# Patient Record
Sex: MALE | Race: OTHER | HISPANIC OR LATINO | URBAN - METROPOLITAN AREA
[De-identification: names, ages, dates, MRNs, and addresses within clinical notes are randomized per-mention and may not be internally consistent; named-entity substitution may affect disease eponyms.]

---

## 2017-02-18 ENCOUNTER — INPATIENT (INPATIENT)
Facility: HOSPITAL | Age: 82
LOS: 3 days | Discharge: HOME | DRG: 243 | End: 2017-02-22
Attending: HOSPITALIST | Admitting: HOSPITALIST
Payer: MEDICARE

## 2017-02-18 VITALS
RESPIRATION RATE: 18 BRPM | DIASTOLIC BLOOD PRESSURE: 67 MMHG | SYSTOLIC BLOOD PRESSURE: 142 MMHG | OXYGEN SATURATION: 96 % | HEART RATE: 62 BPM | TEMPERATURE: 97 F

## 2017-02-18 DIAGNOSIS — Z90.49 ACQUIRED ABSENCE OF OTHER SPECIFIED PARTS OF DIGESTIVE TRACT: Chronic | ICD-10-CM

## 2017-02-18 DIAGNOSIS — Z98.890 OTHER SPECIFIED POSTPROCEDURAL STATES: Chronic | ICD-10-CM

## 2017-02-18 DIAGNOSIS — Z96.643 PRESENCE OF ARTIFICIAL HIP JOINT, BILATERAL: Chronic | ICD-10-CM

## 2017-02-18 DIAGNOSIS — R55 SYNCOPE AND COLLAPSE: ICD-10-CM

## 2017-02-18 LAB
ALBUMIN SERPL ELPH-MCNC: 3.8 G/DL — SIGNIFICANT CHANGE UP (ref 3.3–5)
ALP SERPL-CCNC: 66 U/L — SIGNIFICANT CHANGE UP (ref 40–120)
ALT FLD-CCNC: 13 U/L RC — SIGNIFICANT CHANGE UP (ref 10–45)
ANION GAP SERPL CALC-SCNC: 14 MMOL/L — SIGNIFICANT CHANGE UP (ref 5–17)
APPEARANCE UR: CLEAR — SIGNIFICANT CHANGE UP
AST SERPL-CCNC: 22 U/L — SIGNIFICANT CHANGE UP (ref 10–40)
BASOPHILS # BLD AUTO: 0.1 K/UL — SIGNIFICANT CHANGE UP (ref 0–0.2)
BASOPHILS NFR BLD AUTO: 0.8 % — SIGNIFICANT CHANGE UP (ref 0–2)
BILIRUB SERPL-MCNC: 0.4 MG/DL — SIGNIFICANT CHANGE UP (ref 0.2–1.2)
BILIRUB UR-MCNC: NEGATIVE — SIGNIFICANT CHANGE UP
BUN SERPL-MCNC: 21 MG/DL — SIGNIFICANT CHANGE UP (ref 7–23)
CALCIUM SERPL-MCNC: 8.8 MG/DL — SIGNIFICANT CHANGE UP (ref 8.4–10.5)
CHLORIDE SERPL-SCNC: 103 MMOL/L — SIGNIFICANT CHANGE UP (ref 96–108)
CK MB BLD-MCNC: 7.1 % — HIGH (ref 0–3.5)
CK MB CFR SERPL CALC: 11.5 NG/ML — HIGH (ref 0–6.7)
CK SERPL-CCNC: 161 U/L — SIGNIFICANT CHANGE UP (ref 30–200)
CO2 SERPL-SCNC: 23 MMOL/L — SIGNIFICANT CHANGE UP (ref 22–31)
COLOR SPEC: YELLOW — SIGNIFICANT CHANGE UP
CREAT SERPL-MCNC: 0.92 MG/DL — SIGNIFICANT CHANGE UP (ref 0.5–1.3)
DIFF PNL FLD: NEGATIVE — SIGNIFICANT CHANGE UP
EOSINOPHIL # BLD AUTO: 0.3 K/UL — SIGNIFICANT CHANGE UP (ref 0–0.5)
EOSINOPHIL NFR BLD AUTO: 3.9 % — SIGNIFICANT CHANGE UP (ref 0–6)
GAS PNL BLDV: SIGNIFICANT CHANGE UP
GLUCOSE SERPL-MCNC: 87 MG/DL — SIGNIFICANT CHANGE UP (ref 70–99)
GLUCOSE UR QL: NEGATIVE — SIGNIFICANT CHANGE UP
HCT VFR BLD CALC: 41.3 % — SIGNIFICANT CHANGE UP (ref 39–50)
HGB BLD-MCNC: 13.6 G/DL — SIGNIFICANT CHANGE UP (ref 13–17)
HYALINE CASTS # UR AUTO: ABNORMAL
KETONES UR-MCNC: ABNORMAL
LEUKOCYTE ESTERASE UR-ACNC: NEGATIVE — SIGNIFICANT CHANGE UP
LYMPHOCYTES # BLD AUTO: 2.2 K/UL — SIGNIFICANT CHANGE UP (ref 1–3.3)
LYMPHOCYTES # BLD AUTO: 30.7 % — SIGNIFICANT CHANGE UP (ref 13–44)
MCHC RBC-ENTMCNC: 30.9 PG — SIGNIFICANT CHANGE UP (ref 27–34)
MCHC RBC-ENTMCNC: 33 GM/DL — SIGNIFICANT CHANGE UP (ref 32–36)
MCV RBC AUTO: 93.5 FL — SIGNIFICANT CHANGE UP (ref 80–100)
MONOCYTES # BLD AUTO: 0.9 K/UL — SIGNIFICANT CHANGE UP (ref 0–0.9)
MONOCYTES NFR BLD AUTO: 12.8 % — SIGNIFICANT CHANGE UP (ref 2–14)
NEUTROPHILS # BLD AUTO: 3.8 K/UL — SIGNIFICANT CHANGE UP (ref 1.8–7.4)
NEUTROPHILS NFR BLD AUTO: 51.8 % — SIGNIFICANT CHANGE UP (ref 43–77)
NITRITE UR-MCNC: NEGATIVE — SIGNIFICANT CHANGE UP
PH UR: 5.5 — SIGNIFICANT CHANGE UP (ref 4.8–8)
PLATELET # BLD AUTO: 182 K/UL — SIGNIFICANT CHANGE UP (ref 150–400)
POTASSIUM SERPL-MCNC: 4.2 MMOL/L — SIGNIFICANT CHANGE UP (ref 3.5–5.3)
POTASSIUM SERPL-SCNC: 4.2 MMOL/L — SIGNIFICANT CHANGE UP (ref 3.5–5.3)
PROT SERPL-MCNC: 6.9 G/DL — SIGNIFICANT CHANGE UP (ref 6–8.3)
PROT UR-MCNC: NEGATIVE — SIGNIFICANT CHANGE UP
RBC # BLD: 4.42 M/UL — SIGNIFICANT CHANGE UP (ref 4.2–5.8)
RBC # FLD: 15.2 % — HIGH (ref 10.3–14.5)
RBC CASTS # UR COMP ASSIST: SIGNIFICANT CHANGE UP /HPF (ref 0–2)
SODIUM SERPL-SCNC: 140 MMOL/L — SIGNIFICANT CHANGE UP (ref 135–145)
SP GR SPEC: 1.02 — SIGNIFICANT CHANGE UP (ref 1.01–1.02)
TROPONIN T SERPL-MCNC: 0.03 NG/ML — SIGNIFICANT CHANGE UP (ref 0–0.06)
UROBILINOGEN FLD QL: NEGATIVE — SIGNIFICANT CHANGE UP
WBC # BLD: 7.3 K/UL — SIGNIFICANT CHANGE UP (ref 3.8–10.5)
WBC # FLD AUTO: 7.3 K/UL — SIGNIFICANT CHANGE UP (ref 3.8–10.5)
WBC UR QL: SIGNIFICANT CHANGE UP /HPF (ref 0–5)

## 2017-02-18 PROCEDURE — 93010 ELECTROCARDIOGRAM REPORT: CPT

## 2017-02-18 PROCEDURE — 99223 1ST HOSP IP/OBS HIGH 75: CPT

## 2017-02-18 PROCEDURE — 71010: CPT | Mod: 26

## 2017-02-18 PROCEDURE — 99285 EMERGENCY DEPT VISIT HI MDM: CPT | Mod: 25,GC

## 2017-02-18 RX ORDER — FINASTERIDE 5 MG/1
5 TABLET, FILM COATED ORAL DAILY
Qty: 0 | Refills: 0 | Status: DISCONTINUED | OUTPATIENT
Start: 2017-02-18 | End: 2017-02-22

## 2017-02-18 RX ORDER — ENOXAPARIN SODIUM 100 MG/ML
40 INJECTION SUBCUTANEOUS EVERY 24 HOURS
Qty: 0 | Refills: 0 | Status: DISCONTINUED | OUTPATIENT
Start: 2017-02-18 | End: 2017-02-22

## 2017-02-18 RX ORDER — TAMSULOSIN HYDROCHLORIDE 0.4 MG/1
0.4 CAPSULE ORAL AT BEDTIME
Qty: 0 | Refills: 0 | Status: DISCONTINUED | OUTPATIENT
Start: 2017-02-18 | End: 2017-02-22

## 2017-02-18 RX ORDER — SODIUM CHLORIDE 9 MG/ML
500 INJECTION INTRAMUSCULAR; INTRAVENOUS; SUBCUTANEOUS ONCE
Qty: 0 | Refills: 0 | Status: COMPLETED | OUTPATIENT
Start: 2017-02-18 | End: 2017-02-18

## 2017-02-18 RX ADMIN — SODIUM CHLORIDE 1000 MILLILITER(S): 9 INJECTION INTRAMUSCULAR; INTRAVENOUS; SUBCUTANEOUS at 21:45

## 2017-02-18 NOTE — ED ADULT TRIAGE NOTE - CHIEF COMPLAINT QUOTE
witnessed syncopal episode tonight at home while sitting in chair, did not fall- just head tilted back. when he came to his heart rate was 35.He had a h/a last night and earlier today with was relief with tylenol aprox 1600. feeling a little lightheaded now" witnessed syncopal episode tonight at home while sitting in chair, did not fall- just head tilted back. when he came to his heart rate was 35.He had a h/a last night and earlier today with was relief with tylenol aprox 1600. feeling a little lightheaded now. just move here on 2/14/17 [place]"

## 2017-02-18 NOTE — H&P ADULT. - PROBLEM SELECTOR PLAN 1
Orthostatics in ER negative. Somewhat suspicious for cardiogenic syncope given abnormal ekg. As per daughter, old ekg showed only LVH. No obvious inciting event. Will finish trending cardiac enzymes, if negative x2 will schedule stress test. Will e-mail cardiology consult, if troponin elevated inform cardiology and d/c diet order.  -Telemetry  -TTE  -Trend cardiac enzymes, place stress test order if negative or can defer to cardiology.  -Would discuss with cards/EP utility of loop recorder or holter monitor if work up is negative.

## 2017-02-18 NOTE — H&P ADULT. - HISTORY OF PRESENT ILLNESS
85 Bengali M w/ BPH, s/p bilateral hip replacements p/w syncope. Pt sitting in kitchen talking on phone while daughter prepared food when suddenly his daughter heard the person on the phone saying hello?, hello?. Her father was slumped against the wall with his eyes closed and not responding for 1-2 minutes. 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 Pt felt nausea initially after waking and it soon resolved. He could not recall these events. Pt denies chest pain, palpitations, LE edema, orthopnea, dyspnea, dizziness, any hx of syncope. Pt did not have significant exertion during the day, had a finger level of [place] earlier at night while he was evaluating documents, pt is a . Reportedly stress test 20 yrs ago for hip replacements was negative. Pt and daughter deny any significant cardiac history.    In ER: Given NS 500ccs

## 2017-02-18 NOTE — ED PROVIDER NOTE - OBJECTIVE STATEMENT
85 M with BPH, hip replacement, here for syncope. Patient syncopized while sitting down on phone, eyes were open, not moving, unresponsive for 2 min. Spontaneously resolved. vitals measured by daughter to be HR 35 and /70 on portable home monitor. Last stress test done in [place] 20 years ago after hip replacement. No chest pain, SOB, headache, focal weakness before or after syncope. No generalized or focal body movements around syncope. Patient mental status improved rapidly after syncope.

## 2017-02-18 NOTE — ED PROVIDER NOTE - MEDICAL DECISION MAKING DETAILS
syncope, doubt intracranial process, suspect cardiac etiology given H/P. Will do CXR, EKG, basics, cardiac enzymes, UA, UCx, admit for stress test and monitoring.

## 2017-02-18 NOTE — ED ADULT NURSE NOTE - CHIEF COMPLAINT QUOTE
witnessed syncopal episode tonight at home while sitting in chair, did not fall- just head tilted back. when he came to his heart rate was 35.He had a h/a last night and earlier today with was relief with tylenol aprox 1600. feeling a little lightheaded now"

## 2017-02-18 NOTE — H&P ADULT. - FAMILY HISTORY
Sibling  Still living? Unknown  Family history of AICD (automatic internal cardiac defibrillator), Age at diagnosis: Age Unknown

## 2017-02-18 NOTE — ED ADULT NURSE NOTE - PSH
H/O bilateral hip replacements    H/O carpal tunnel repair    History of appendectomy    History of right cataract surgery  bilat cataract sx

## 2017-02-18 NOTE — ED ADULT NURSE NOTE - OBJECTIVE STATEMENT
84 y/o male arrived to ED c.o syncope. Per daughter pt was sitting in chair and talking on phone when head nodded back for approximately 1 minute and pt became pale non responsive to voice. When pt awoke immediately back to baseline. Pt a&ox3, neg sob, neg chest pain, abd soft nontender, pulse motor sensoryx4, skin warm dry intact. 20g Iv placed RAC.  Family at bedside. Placed on CM , will continue to monitor

## 2017-02-18 NOTE — ED PROVIDER NOTE - ATTENDING CONTRIBUTION TO CARE
85 yom pmhx bph, prior hip replacement, presents after syncope. as per daughter at bedside, pt was talking on the phone when he suddenly became unresponsive while sitting in a chair. daughter witnessed episode - states he was minimally responsive x1-2 minutes, no seizure activity, no diaphoresis. daugther had an 02 sat monitor at home and noted pt to have a hr in 30's per this monitor at home. states sxs resolved and pt is now back to baseline mental status. pt cannot recollect specifics of the event, but does not report any recent cp or sob. last stress test decades ago.    ROS:   constitutional - no fever, no chills  eyes - no visual changes, no redness  eent - no sore throat, no nasal congestion  cvs - no chest pain, no leg swelling, + syncope  resp - no shortness of breath, no cough  gi - no abdominal pain, no vomiting, no diarrhea  gu - no dysuria, no hematuria  msk - no acute back pain, no joint swelling  skin - no rashes, no jaundice  neuro - no headache, no focal weakness  psych - no acute mental health issue     Physical Exam:   constitutional - well appearing, awake and alert, oriented x3  head - no external evidence of trauma  cvs - rrr, no murmurs, no peripheral edema  resp - breath sounds clear and equal bilat  gi - abdomen soft and nontender, no rigidity, guarding or rebound, bowel sounds present  msk - moving all extremities spontaneously  neuro - alert and oriented x3, no focal deficits, CNs 2-12 grossly intact  skin- no jaundice, warm and dry  psych - mood and affect wnl, no apparent risk to self or others     ? whether severe bradycardia was cause for syncopal episode vs acs. no episodes of significant bradycardia during ED stay. will admit for ongoing eval. RAJNI Ayala MD

## 2017-02-19 DIAGNOSIS — N40.0 BENIGN PROSTATIC HYPERPLASIA WITHOUT LOWER URINARY TRACT SYMPTOMS: ICD-10-CM

## 2017-02-19 DIAGNOSIS — Z29.9 ENCOUNTER FOR PROPHYLACTIC MEASURES, UNSPECIFIED: ICD-10-CM

## 2017-02-19 DIAGNOSIS — R55 SYNCOPE AND COLLAPSE: ICD-10-CM

## 2017-02-19 LAB
APTT BLD: 30.5 SEC — SIGNIFICANT CHANGE UP (ref 27.5–37.4)
CHOLEST SERPL-MCNC: 180 MG/DL — SIGNIFICANT CHANGE UP (ref 10–199)
CK MB BLD-MCNC: 7.5 % — HIGH (ref 0–3.5)
CK MB CFR SERPL CALC: 10.7 NG/ML — HIGH (ref 0–6.7)
CK MB CFR SERPL CALC: 9 NG/ML — HIGH (ref 0–6.7)
CK SERPL-CCNC: 120 U/L — SIGNIFICANT CHANGE UP (ref 30–200)
CK SERPL-CCNC: 142 U/L — SIGNIFICANT CHANGE UP (ref 30–200)
CULTURE RESULTS: NO GROWTH — SIGNIFICANT CHANGE UP
HBA1C BLD-MCNC: 5.6 % — SIGNIFICANT CHANGE UP (ref 4–5.6)
HDLC SERPL-MCNC: 54 MG/DL — SIGNIFICANT CHANGE UP (ref 40–125)
INR BLD: 1.11 RATIO — SIGNIFICANT CHANGE UP (ref 0.88–1.16)
LIPID PNL WITH DIRECT LDL SERPL: 111 MG/DL — SIGNIFICANT CHANGE UP
NT-PROBNP SERPL-SCNC: 212 PG/ML — SIGNIFICANT CHANGE UP (ref 0–300)
PROTHROM AB SERPL-ACNC: 12.6 SEC — SIGNIFICANT CHANGE UP (ref 10–13.1)
SPECIMEN SOURCE: SIGNIFICANT CHANGE UP
TOTAL CHOLESTEROL/HDL RATIO MEASUREMENT: 3.3 RATIO — LOW (ref 3.4–9.6)
TRIGL SERPL-MCNC: 77 MG/DL — SIGNIFICANT CHANGE UP (ref 10–149)
TROPONIN T SERPL-MCNC: 0.03 NG/ML — SIGNIFICANT CHANGE UP (ref 0–0.06)
TROPONIN T SERPL-MCNC: 0.05 NG/ML — SIGNIFICANT CHANGE UP (ref 0–0.06)
TSH SERPL-MCNC: 2.47 UIU/ML — SIGNIFICANT CHANGE UP (ref 0.27–4.2)

## 2017-02-19 PROCEDURE — 93306 TTE W/DOPPLER COMPLETE: CPT | Mod: 26

## 2017-02-19 PROCEDURE — 93010 ELECTROCARDIOGRAM REPORT: CPT

## 2017-02-19 PROCEDURE — 99233 SBSQ HOSP IP/OBS HIGH 50: CPT

## 2017-02-19 RX ORDER — INFLUENZA VIRUS VACCINE 15; 15; 15; 15 UG/.5ML; UG/.5ML; UG/.5ML; UG/.5ML
0.5 SUSPENSION INTRAMUSCULAR ONCE
Qty: 0 | Refills: 0 | Status: COMPLETED | OUTPATIENT
Start: 2017-02-19 | End: 2017-02-19

## 2017-02-19 RX ADMIN — ENOXAPARIN SODIUM 40 MILLIGRAM(S): 100 INJECTION SUBCUTANEOUS at 05:40

## 2017-02-19 RX ADMIN — FINASTERIDE 5 MILLIGRAM(S): 5 TABLET, FILM COATED ORAL at 12:13

## 2017-02-19 RX ADMIN — TAMSULOSIN HYDROCHLORIDE 0.4 MILLIGRAM(S): 0.4 CAPSULE ORAL at 21:24

## 2017-02-19 NOTE — PATIENT PROFILE ADULT. - VISION (WITH CORRECTIVE LENSES IF THE PATIENT USUALLY WEARS THEM):
Normal vision: sees adequately in most situations; can see medication labels, newsprint/Hx of cataract sx b/l Summer 2014

## 2017-02-20 PROCEDURE — 99232 SBSQ HOSP IP/OBS MODERATE 35: CPT

## 2017-02-20 RX ORDER — ACETAMINOPHEN 500 MG
650 TABLET ORAL EVERY 6 HOURS
Qty: 0 | Refills: 0 | Status: DISCONTINUED | OUTPATIENT
Start: 2017-02-20 | End: 2017-02-22

## 2017-02-20 RX ORDER — MAGNESIUM HYDROXIDE 400 MG/1
30 TABLET, CHEWABLE ORAL ONCE
Qty: 0 | Refills: 0 | Status: COMPLETED | OUTPATIENT
Start: 2017-02-20 | End: 2017-02-20

## 2017-02-20 RX ADMIN — Medication 650 MILLIGRAM(S): at 15:58

## 2017-02-20 RX ADMIN — FINASTERIDE 5 MILLIGRAM(S): 5 TABLET, FILM COATED ORAL at 11:24

## 2017-02-20 RX ADMIN — MAGNESIUM HYDROXIDE 30 MILLILITER(S): 400 TABLET, CHEWABLE ORAL at 22:48

## 2017-02-20 RX ADMIN — TAMSULOSIN HYDROCHLORIDE 0.4 MILLIGRAM(S): 0.4 CAPSULE ORAL at 21:32

## 2017-02-20 RX ADMIN — ENOXAPARIN SODIUM 40 MILLIGRAM(S): 100 INJECTION SUBCUTANEOUS at 05:13

## 2017-02-20 RX ADMIN — Medication 650 MILLIGRAM(S): at 16:28

## 2017-02-21 PROBLEM — N40.0 BENIGN PROSTATIC HYPERPLASIA WITHOUT LOWER URINARY TRACT SYMPTOMS: Chronic | Status: ACTIVE | Noted: 2017-02-18

## 2017-02-21 LAB
ANION GAP SERPL CALC-SCNC: 13 MMOL/L — SIGNIFICANT CHANGE UP (ref 5–17)
BUN SERPL-MCNC: 18 MG/DL — SIGNIFICANT CHANGE UP (ref 7–23)
CALCIUM SERPL-MCNC: 9.8 MG/DL — SIGNIFICANT CHANGE UP (ref 8.4–10.5)
CHLORIDE SERPL-SCNC: 102 MMOL/L — SIGNIFICANT CHANGE UP (ref 96–108)
CO2 SERPL-SCNC: 26 MMOL/L — SIGNIFICANT CHANGE UP (ref 22–31)
CREAT SERPL-MCNC: 0.97 MG/DL — SIGNIFICANT CHANGE UP (ref 0.5–1.3)
GLUCOSE SERPL-MCNC: 85 MG/DL — SIGNIFICANT CHANGE UP (ref 70–99)
HCT VFR BLD CALC: 46.4 % — SIGNIFICANT CHANGE UP (ref 39–50)
HGB BLD-MCNC: 15.1 G/DL — SIGNIFICANT CHANGE UP (ref 13–17)
MCHC RBC-ENTMCNC: 30.1 PG — SIGNIFICANT CHANGE UP (ref 27–34)
MCHC RBC-ENTMCNC: 32.5 GM/DL — SIGNIFICANT CHANGE UP (ref 32–36)
MCV RBC AUTO: 92.8 FL — SIGNIFICANT CHANGE UP (ref 80–100)
PLATELET # BLD AUTO: 193 K/UL — SIGNIFICANT CHANGE UP (ref 150–400)
POTASSIUM SERPL-MCNC: 4.6 MMOL/L — SIGNIFICANT CHANGE UP (ref 3.5–5.3)
POTASSIUM SERPL-SCNC: 4.6 MMOL/L — SIGNIFICANT CHANGE UP (ref 3.5–5.3)
RBC # BLD: 5 M/UL — SIGNIFICANT CHANGE UP (ref 4.2–5.8)
RBC # FLD: 15.6 % — HIGH (ref 10.3–14.5)
SODIUM SERPL-SCNC: 141 MMOL/L — SIGNIFICANT CHANGE UP (ref 135–145)
WBC # BLD: 8.4 K/UL — SIGNIFICANT CHANGE UP (ref 3.8–10.5)
WBC # FLD AUTO: 8.4 K/UL — SIGNIFICANT CHANGE UP (ref 3.8–10.5)

## 2017-02-21 PROCEDURE — 71010: CPT | Mod: 26

## 2017-02-21 PROCEDURE — 99232 SBSQ HOSP IP/OBS MODERATE 35: CPT

## 2017-02-21 PROCEDURE — 33208 INSRT HEART PM ATRIAL & VENT: CPT | Mod: 59,KX

## 2017-02-21 PROCEDURE — 93010 ELECTROCARDIOGRAM REPORT: CPT

## 2017-02-21 PROCEDURE — 93620 COMP EP EVL R AT VEN PAC&REC: CPT | Mod: 26

## 2017-02-21 RX ORDER — CEFAZOLIN SODIUM 1 G
1000 VIAL (EA) INJECTION EVERY 8 HOURS
Qty: 0 | Refills: 0 | Status: COMPLETED | OUTPATIENT
Start: 2017-02-21 | End: 2017-02-22

## 2017-02-21 RX ADMIN — Medication 100 MILLIGRAM(S): at 20:21

## 2017-02-21 RX ADMIN — TAMSULOSIN HYDROCHLORIDE 0.4 MILLIGRAM(S): 0.4 CAPSULE ORAL at 20:22

## 2017-02-21 RX ADMIN — Medication 650 MILLIGRAM(S): at 18:14

## 2017-02-21 RX ADMIN — FINASTERIDE 5 MILLIGRAM(S): 5 TABLET, FILM COATED ORAL at 18:13

## 2017-02-21 RX ADMIN — Medication 650 MILLIGRAM(S): at 19:00

## 2017-02-22 ENCOUNTER — TRANSCRIPTION ENCOUNTER (OUTPATIENT)
Age: 82
End: 2017-02-22

## 2017-02-22 VITALS
DIASTOLIC BLOOD PRESSURE: 68 MMHG | HEART RATE: 88 BPM | RESPIRATION RATE: 18 BRPM | SYSTOLIC BLOOD PRESSURE: 120 MMHG | OXYGEN SATURATION: 92 % | TEMPERATURE: 97 F

## 2017-02-22 LAB
ANION GAP SERPL CALC-SCNC: 16 MMOL/L — SIGNIFICANT CHANGE UP (ref 5–17)
BUN SERPL-MCNC: 19 MG/DL — SIGNIFICANT CHANGE UP (ref 7–23)
CALCIUM SERPL-MCNC: 8.7 MG/DL — SIGNIFICANT CHANGE UP (ref 8.4–10.5)
CHLORIDE SERPL-SCNC: 101 MMOL/L — SIGNIFICANT CHANGE UP (ref 96–108)
CO2 SERPL-SCNC: 22 MMOL/L — SIGNIFICANT CHANGE UP (ref 22–31)
CREAT SERPL-MCNC: 0.84 MG/DL — SIGNIFICANT CHANGE UP (ref 0.5–1.3)
GLUCOSE SERPL-MCNC: 103 MG/DL — HIGH (ref 70–99)
HCT VFR BLD CALC: 41.4 % — SIGNIFICANT CHANGE UP (ref 39–50)
HGB BLD-MCNC: 13.8 G/DL — SIGNIFICANT CHANGE UP (ref 13–17)
MCHC RBC-ENTMCNC: 29.6 PG — SIGNIFICANT CHANGE UP (ref 27–34)
MCHC RBC-ENTMCNC: 33.3 GM/DL — SIGNIFICANT CHANGE UP (ref 32–36)
MCV RBC AUTO: 88.8 FL — SIGNIFICANT CHANGE UP (ref 80–100)
PLATELET # BLD AUTO: 185 K/UL — SIGNIFICANT CHANGE UP (ref 150–400)
POTASSIUM SERPL-MCNC: 4 MMOL/L — SIGNIFICANT CHANGE UP (ref 3.5–5.3)
POTASSIUM SERPL-SCNC: 4 MMOL/L — SIGNIFICANT CHANGE UP (ref 3.5–5.3)
RBC # BLD: 4.66 M/UL — SIGNIFICANT CHANGE UP (ref 4.2–5.8)
RBC # FLD: 16.1 % — HIGH (ref 10.3–14.5)
SODIUM SERPL-SCNC: 139 MMOL/L — SIGNIFICANT CHANGE UP (ref 135–145)
WBC # BLD: 8.55 K/UL — SIGNIFICANT CHANGE UP (ref 3.8–10.5)
WBC # FLD AUTO: 8.55 K/UL — SIGNIFICANT CHANGE UP (ref 3.8–10.5)

## 2017-02-22 PROCEDURE — 87086 URINE CULTURE/COLONY COUNT: CPT

## 2017-02-22 PROCEDURE — 82803 BLOOD GASES ANY COMBINATION: CPT

## 2017-02-22 PROCEDURE — 85027 COMPLETE CBC AUTOMATED: CPT

## 2017-02-22 PROCEDURE — 85014 HEMATOCRIT: CPT

## 2017-02-22 PROCEDURE — 71045 X-RAY EXAM CHEST 1 VIEW: CPT

## 2017-02-22 PROCEDURE — 93306 TTE W/DOPPLER COMPLETE: CPT

## 2017-02-22 PROCEDURE — 82435 ASSAY OF BLOOD CHLORIDE: CPT

## 2017-02-22 PROCEDURE — 83880 ASSAY OF NATRIURETIC PEPTIDE: CPT

## 2017-02-22 PROCEDURE — 97162 PT EVAL MOD COMPLEX 30 MIN: CPT

## 2017-02-22 PROCEDURE — 93005 ELECTROCARDIOGRAM TRACING: CPT

## 2017-02-22 PROCEDURE — 84443 ASSAY THYROID STIM HORMONE: CPT

## 2017-02-22 PROCEDURE — C1892: CPT

## 2017-02-22 PROCEDURE — 82947 ASSAY GLUCOSE BLOOD QUANT: CPT

## 2017-02-22 PROCEDURE — 80048 BASIC METABOLIC PNL TOTAL CA: CPT

## 2017-02-22 PROCEDURE — 82550 ASSAY OF CK (CPK): CPT

## 2017-02-22 PROCEDURE — 99239 HOSP IP/OBS DSCHRG MGMT >30: CPT

## 2017-02-22 PROCEDURE — 82553 CREATINE MB FRACTION: CPT

## 2017-02-22 PROCEDURE — C1785: CPT

## 2017-02-22 PROCEDURE — 84132 ASSAY OF SERUM POTASSIUM: CPT

## 2017-02-22 PROCEDURE — 80053 COMPREHEN METABOLIC PANEL: CPT

## 2017-02-22 PROCEDURE — 83036 HEMOGLOBIN GLYCOSYLATED A1C: CPT

## 2017-02-22 PROCEDURE — 83605 ASSAY OF LACTIC ACID: CPT

## 2017-02-22 PROCEDURE — 99285 EMERGENCY DEPT VISIT HI MDM: CPT | Mod: 25

## 2017-02-22 PROCEDURE — C1730: CPT

## 2017-02-22 PROCEDURE — 85610 PROTHROMBIN TIME: CPT

## 2017-02-22 PROCEDURE — 33208 INSRT HEART PM ATRIAL & VENT: CPT | Mod: 59

## 2017-02-22 PROCEDURE — 82330 ASSAY OF CALCIUM: CPT

## 2017-02-22 PROCEDURE — C1894: CPT

## 2017-02-22 PROCEDURE — 81001 URINALYSIS AUTO W/SCOPE: CPT

## 2017-02-22 PROCEDURE — 84295 ASSAY OF SERUM SODIUM: CPT

## 2017-02-22 PROCEDURE — C1898: CPT

## 2017-02-22 PROCEDURE — 82962 GLUCOSE BLOOD TEST: CPT

## 2017-02-22 PROCEDURE — 80061 LIPID PANEL: CPT

## 2017-02-22 PROCEDURE — 85730 THROMBOPLASTIN TIME PARTIAL: CPT

## 2017-02-22 PROCEDURE — 93620 COMP EP EVL R AT VEN PAC&REC: CPT

## 2017-02-22 PROCEDURE — 84484 ASSAY OF TROPONIN QUANT: CPT

## 2017-02-22 RX ORDER — TAMSULOSIN HYDROCHLORIDE 0.4 MG/1
1 CAPSULE ORAL
Qty: 0 | Refills: 0 | COMMUNITY
Start: 2017-02-22

## 2017-02-22 RX ADMIN — Medication 100 MILLIGRAM(S): at 11:37

## 2017-02-22 RX ADMIN — Medication 650 MILLIGRAM(S): at 05:45

## 2017-02-22 RX ADMIN — Medication 650 MILLIGRAM(S): at 05:15

## 2017-02-22 RX ADMIN — FINASTERIDE 5 MILLIGRAM(S): 5 TABLET, FILM COATED ORAL at 11:37

## 2017-02-22 RX ADMIN — ENOXAPARIN SODIUM 40 MILLIGRAM(S): 100 INJECTION SUBCUTANEOUS at 05:17

## 2017-02-22 RX ADMIN — Medication 100 MILLIGRAM(S): at 05:18

## 2017-02-22 NOTE — DISCHARGE NOTE ADULT - MEDICATION SUMMARY - MEDICATIONS TO TAKE
I will START or STAY ON the medications listed below when I get home from the hospital:    finasteride 5 mg oral tablet  -- 1 tab(s) by mouth once a day  -- Indication: For BPH (benign prostatic hyperplasia)    alfuzosin 10 mg oral tablet, extended release  -- 1 tab(s) by mouth once a day  -- Indication: For BPH (benign prostatic hyperplasia)

## 2017-02-22 NOTE — DISCHARGE NOTE ADULT - CARE PLAN
Principal Discharge DX:	Syncope, unspecified syncope type  Secondary Diagnosis:	Benign prostatic hyperplasia, presence of lower urinary tract symptoms unspecified, unspecified morphology Principal Discharge DX:	Syncope, unspecified syncope type  Goal:	Follow up with your doctors  Instructions for follow-up, activity and diet:	You were admitted to the hospital after passing out (syncope) secondary to a low heart rate (bradycardia). You now have a pacemaker implanted which should prevent this from happening again. Please follow up with the EP clinic and your primary doctor after discharge. If you should experience Chest pain, difficulty breathing, further fainting spells, or redness/pain/swelling of pacemaker site please call your doctor or return to the emergency room  Secondary Diagnosis:	Benign prostatic hyperplasia, presence of lower urinary tract symptoms unspecified, unspecified morphology  Instructions for follow-up, activity and diet:	please continue to take your home medications for your prostate Principal Discharge DX:	Syncope, unspecified syncope type  Goal:	Follow up with your doctors  Assessment and plan of treatment:	You were admitted to the hospital after passing out (syncope) secondary to a low heart rate (bradycardia). You now have a pacemaker implanted which should prevent this from happening again. Please follow up with the EP clinic and your primary doctor after discharge. If you should experience Chest pain, difficulty breathing, further fainting spells, or redness/pain/swelling of pacemaker site please call your doctor or return to the emergency room  Secondary Diagnosis:	Benign prostatic hyperplasia, presence of lower urinary tract symptoms unspecified, unspecified morphology  Assessment and plan of treatment:	please continue to take your home medications for your prostate

## 2017-02-22 NOTE — PHYSICAL THERAPY INITIAL EVALUATION ADULT - PLANNED THERAPY INTERVENTIONS, PT EVAL
stair negotiation/balance training/strengthening/transfer training/gait training/bed mobility training

## 2017-02-22 NOTE — PHYSICAL THERAPY INITIAL EVALUATION ADULT - PERTINENT HX OF CURRENT PROBLEM, REHAB EVAL
86yo M s/p bilateral hip replacements p/w syncope. Pt felt nausea initially after waking and it soon resolved. He could not recall these events.

## 2017-02-22 NOTE — DISCHARGE NOTE ADULT - PLAN OF CARE
Follow up with your doctors You were admitted to the hospital after passing out (syncope) secondary to a low heart rate (bradycardia). You now have a pacemaker implanted which should prevent this from happening again. Please follow up with the EP clinic and your primary doctor after discharge. If you should experience Chest pain, difficulty breathing, further fainting spells, or redness/pain/swelling of pacemaker site please call your doctor or return to the emergency room please continue to take your home medications for your prostate

## 2017-02-22 NOTE — DISCHARGE NOTE ADULT - PATIENT PORTAL LINK FT
“You can access the Follow[facility] Patient Portal, offered by [facility], by registering with the following website: [email or web address]”

## 2017-02-22 NOTE — DISCHARGE NOTE ADULT - HOSPITAL COURSE
h 85 Croatian M w/ BPH, s/p bilateral hip replacements p/w syncope 2/2 symptomatic bradycardia. patient was evaluated by electrophysiology and deemed appropriate candidate for pacemaker. S/p pacemaker placement during hospitalizaiton and pt is medically stable for d/c home with close follow up.

## 2017-02-22 NOTE — DISCHARGE NOTE ADULT - VISION (WITH CORRECTIVE LENSES IF THE PATIENT USUALLY WEARS THEM):
Hx of cataract sx b/l Summer 2014/Normal vision: sees adequately in most situations; can see medication labels, newsprint

## 2017-02-22 NOTE — DISCHARGE NOTE ADULT - CARE PROVIDER_API CALL
EP Clinic,   You have an appointment for pacemaker check at the EP clinic on March, 3rd 2017 at 9:50am.  Phone: [phone]  Fax: (   )    - EP Clinic,   You have an appointment for pacemaker check at the EP clinic on March, 3rd 2017 at 9:50am.  Phone: [phone]  Fax: (   )    -    Dr. Bridget Gill  Phone: [phone]  Fax: (   )    -

## 2017-02-22 NOTE — PHYSICAL THERAPY INITIAL EVALUATION ADULT - ADDITIONAL COMMENTS
pt lives in a house with his wife and daughter with 2 steps to enter and 1 flight within the home. PTA he was amb I without AD.

## 2017-02-22 NOTE — DISCHARGE NOTE ADULT - PROVIDER TOKENS
FREE:[LAST:[EP Clinic],PHONE:[phone]],FAX:[(   )    -],ADDRESS:[You have an appointment for pacemaker check at the EP clinic on March, 3rd 2017 at 9:50am.]] FREE:[LAST:[EP Clinic],PHONE:[phone]],FAX:[(   )    -],ADDRESS:[You have an appointment for pacemaker check at the EP clinic on March, 3rd 2017 at 9:50am.]],FREE:[LAST:[[facility]],PHONE:[phone]],FAX:[(   )    -],ADDRESS:[Dr. Gill]]

## 2017-03-03 ENCOUNTER — APPOINTMENT (OUTPATIENT)
Dept: ELECTROPHYSIOLOGY | Facility: CLINIC | Age: 82
End: 2017-03-03

## 2017-03-03 VITALS
HEIGHT: 68 IN | DIASTOLIC BLOOD PRESSURE: 73 MMHG | HEART RATE: 75 BPM | BODY MASS INDEX: 26.07 KG/M2 | WEIGHT: 172 LBS | SYSTOLIC BLOOD PRESSURE: 148 MMHG

## 2017-03-03 DIAGNOSIS — Z87.898 PERSONAL HISTORY OF OTHER SPECIFIED CONDITIONS: ICD-10-CM

## 2017-03-03 RX ORDER — FINASTERIDE 5 MG/1
5 TABLET, FILM COATED ORAL
Refills: 0 | Status: ACTIVE | COMMUNITY

## 2017-03-03 RX ORDER — ALFUZOSIN HYDROCHLORIDE 10 MG/1
10 TABLET, EXTENDED RELEASE ORAL
Refills: 0 | Status: ACTIVE | COMMUNITY

## 2017-06-07 ENCOUNTER — APPOINTMENT (OUTPATIENT)
Dept: CARDIOLOGY | Facility: CLINIC | Age: 82
End: 2017-06-07

## 2017-06-07 ENCOUNTER — APPOINTMENT (OUTPATIENT)
Dept: ELECTROPHYSIOLOGY | Facility: CLINIC | Age: 82
End: 2017-06-07

## 2017-06-07 ENCOUNTER — APPOINTMENT (OUTPATIENT)
Dept: PHARMACY | Facility: CLINIC | Age: 82
End: 2017-06-07

## 2017-06-07 ENCOUNTER — NON-APPOINTMENT (OUTPATIENT)
Age: 82
End: 2017-06-07

## 2017-06-07 VITALS — OXYGEN SATURATION: 96 % | SYSTOLIC BLOOD PRESSURE: 151 MMHG | HEART RATE: 59 BPM | DIASTOLIC BLOOD PRESSURE: 77 MMHG

## 2017-06-07 DIAGNOSIS — I65.29 OCCLUSION AND STENOSIS OF UNSPECIFIED CAROTID ARTERY: ICD-10-CM

## 2017-06-07 DIAGNOSIS — Z95.0 PRESENCE OF CARDIAC PACEMAKER: ICD-10-CM

## 2017-06-22 ENCOUNTER — APPOINTMENT (OUTPATIENT)
Dept: ULTRASOUND IMAGING | Facility: HOSPITAL | Age: 82
End: 2017-06-22

## 2017-06-22 ENCOUNTER — OUTPATIENT (OUTPATIENT)
Dept: OUTPATIENT SERVICES | Facility: HOSPITAL | Age: 82
LOS: 1 days | End: 2017-06-22
Payer: MEDICARE

## 2017-06-22 DIAGNOSIS — Z98.890 OTHER SPECIFIED POSTPROCEDURAL STATES: Chronic | ICD-10-CM

## 2017-06-22 DIAGNOSIS — Z00.00 ENCOUNTER FOR GENERAL ADULT MEDICAL EXAMINATION WITHOUT ABNORMAL FINDINGS: ICD-10-CM

## 2017-06-22 DIAGNOSIS — Z96.643 PRESENCE OF ARTIFICIAL HIP JOINT, BILATERAL: Chronic | ICD-10-CM

## 2017-06-22 DIAGNOSIS — Z90.49 ACQUIRED ABSENCE OF OTHER SPECIFIED PARTS OF DIGESTIVE TRACT: Chronic | ICD-10-CM

## 2017-06-22 DIAGNOSIS — N40.1 BENIGN PROSTATIC HYPERPLASIA WITH LOWER URINARY TRACT SYMPTOMS: ICD-10-CM

## 2017-06-22 PROCEDURE — 93880 EXTRACRANIAL BILAT STUDY: CPT

## 2017-06-22 PROCEDURE — 93880 EXTRACRANIAL BILAT STUDY: CPT | Mod: 26

## 2017-06-27 RX ORDER — PRAVASTATIN SODIUM 10 MG/1
10 TABLET ORAL
Qty: 90 | Refills: 2 | Status: ACTIVE | COMMUNITY
Start: 2017-06-27 | End: 1900-01-01

## 2017-07-25 PROBLEM — Z95.0 CARDIAC PACEMAKER: Status: ACTIVE | Noted: 2017-03-03

## 2017-07-25 PROBLEM — I65.29 CAROTID ARTERY PLAQUE: Status: ACTIVE | Noted: 2017-07-25

## 2017-08-09 ENCOUNTER — APPOINTMENT (OUTPATIENT)
Dept: ELECTROPHYSIOLOGY | Facility: CLINIC | Age: 82
End: 2017-08-09
Payer: MEDICARE

## 2017-08-09 ENCOUNTER — NON-APPOINTMENT (OUTPATIENT)
Age: 82
End: 2017-08-09

## 2017-08-09 VITALS
BODY MASS INDEX: 27.58 KG/M2 | WEIGHT: 182 LBS | OXYGEN SATURATION: 95 % | SYSTOLIC BLOOD PRESSURE: 127 MMHG | DIASTOLIC BLOOD PRESSURE: 74 MMHG | HEART RATE: 61 BPM | HEIGHT: 68 IN

## 2017-08-09 PROCEDURE — 93280 PM DEVICE PROGR EVAL DUAL: CPT

## 2017-11-22 ENCOUNTER — APPOINTMENT (OUTPATIENT)
Dept: PAIN MANAGEMENT | Facility: CLINIC | Age: 82
End: 2017-11-22

## 2017-12-13 ENCOUNTER — APPOINTMENT (OUTPATIENT)
Dept: ELECTROPHYSIOLOGY | Facility: CLINIC | Age: 82
End: 2017-12-13
Payer: MEDICARE

## 2017-12-13 ENCOUNTER — APPOINTMENT (OUTPATIENT)
Dept: CARDIOLOGY | Facility: CLINIC | Age: 82
End: 2017-12-13
Payer: MEDICARE

## 2017-12-13 ENCOUNTER — NON-APPOINTMENT (OUTPATIENT)
Age: 82
End: 2017-12-13

## 2017-12-13 VITALS — HEART RATE: 65 BPM | OXYGEN SATURATION: 95 % | SYSTOLIC BLOOD PRESSURE: 160 MMHG | DIASTOLIC BLOOD PRESSURE: 77 MMHG

## 2017-12-13 PROCEDURE — 93000 ELECTROCARDIOGRAM COMPLETE: CPT

## 2017-12-13 PROCEDURE — 93280 PM DEVICE PROGR EVAL DUAL: CPT

## 2017-12-13 PROCEDURE — 99215 OFFICE O/P EST HI 40 MIN: CPT

## 2017-12-18 ENCOUNTER — OUTPATIENT (OUTPATIENT)
Dept: OUTPATIENT SERVICES | Facility: HOSPITAL | Age: 82
LOS: 1 days | End: 2017-12-18
Payer: MEDICARE

## 2017-12-18 ENCOUNTER — APPOINTMENT (OUTPATIENT)
Dept: CV DIAGNOSTICS | Facility: HOSPITAL | Age: 82
End: 2017-12-18

## 2017-12-18 DIAGNOSIS — Z98.890 OTHER SPECIFIED POSTPROCEDURAL STATES: Chronic | ICD-10-CM

## 2017-12-18 DIAGNOSIS — Z96.643 PRESENCE OF ARTIFICIAL HIP JOINT, BILATERAL: Chronic | ICD-10-CM

## 2017-12-18 DIAGNOSIS — I10 ESSENTIAL (PRIMARY) HYPERTENSION: ICD-10-CM

## 2017-12-18 DIAGNOSIS — Z90.49 ACQUIRED ABSENCE OF OTHER SPECIFIED PARTS OF DIGESTIVE TRACT: Chronic | ICD-10-CM

## 2017-12-18 PROCEDURE — A9500: CPT

## 2017-12-18 PROCEDURE — 78452 HT MUSCLE IMAGE SPECT MULT: CPT | Mod: 26

## 2017-12-18 PROCEDURE — 93016 CV STRESS TEST SUPVJ ONLY: CPT

## 2017-12-18 PROCEDURE — 78452 HT MUSCLE IMAGE SPECT MULT: CPT

## 2017-12-18 PROCEDURE — A9505: CPT

## 2017-12-18 PROCEDURE — 93018 CV STRESS TEST I&R ONLY: CPT

## 2017-12-18 PROCEDURE — 93017 CV STRESS TEST TRACING ONLY: CPT

## 2018-01-11 ENCOUNTER — OUTPATIENT (OUTPATIENT)
Dept: OUTPATIENT SERVICES | Facility: HOSPITAL | Age: 83
LOS: 1 days | End: 2018-01-11
Payer: COMMERCIAL

## 2018-01-11 VITALS
HEART RATE: 60 BPM | SYSTOLIC BLOOD PRESSURE: 169 MMHG | TEMPERATURE: 98 F | HEIGHT: 65 IN | OXYGEN SATURATION: 96 % | DIASTOLIC BLOOD PRESSURE: 75 MMHG | WEIGHT: 149.91 LBS | RESPIRATION RATE: 18 BRPM

## 2018-01-11 DIAGNOSIS — Z98.890 OTHER SPECIFIED POSTPROCEDURAL STATES: Chronic | ICD-10-CM

## 2018-01-11 DIAGNOSIS — R93.1 ABNORMAL FINDINGS ON DIAGNOSTIC IMAGING OF HEART AND CORONARY CIRCULATION: ICD-10-CM

## 2018-01-11 DIAGNOSIS — Z90.49 ACQUIRED ABSENCE OF OTHER SPECIFIED PARTS OF DIGESTIVE TRACT: Chronic | ICD-10-CM

## 2018-01-11 DIAGNOSIS — Z96.643 PRESENCE OF ARTIFICIAL HIP JOINT, BILATERAL: Chronic | ICD-10-CM

## 2018-01-11 LAB
ALBUMIN SERPL ELPH-MCNC: 3.6 G/DL — SIGNIFICANT CHANGE UP (ref 3.3–5)
ALP SERPL-CCNC: 69 U/L — SIGNIFICANT CHANGE UP (ref 40–120)
ALT FLD-CCNC: 11 U/L RC — SIGNIFICANT CHANGE UP (ref 10–45)
ANION GAP SERPL CALC-SCNC: 8 MMOL/L — SIGNIFICANT CHANGE UP (ref 5–17)
AST SERPL-CCNC: 19 U/L — SIGNIFICANT CHANGE UP (ref 10–40)
BILIRUB SERPL-MCNC: 0.4 MG/DL — SIGNIFICANT CHANGE UP (ref 0.2–1.2)
BUN SERPL-MCNC: 18 MG/DL — SIGNIFICANT CHANGE UP (ref 7–23)
CALCIUM SERPL-MCNC: 9 MG/DL — SIGNIFICANT CHANGE UP (ref 8.4–10.5)
CHLORIDE SERPL-SCNC: 104 MMOL/L — SIGNIFICANT CHANGE UP (ref 96–108)
CO2 SERPL-SCNC: 28 MMOL/L — SIGNIFICANT CHANGE UP (ref 22–31)
CREAT SERPL-MCNC: 1.02 MG/DL — SIGNIFICANT CHANGE UP (ref 0.5–1.3)
GLUCOSE SERPL-MCNC: 93 MG/DL — SIGNIFICANT CHANGE UP (ref 70–99)
HCT VFR BLD CALC: 36.5 % — LOW (ref 39–50)
HGB BLD-MCNC: 12.3 G/DL — LOW (ref 13–17)
MCHC RBC-ENTMCNC: 31.2 PG — SIGNIFICANT CHANGE UP (ref 27–34)
MCHC RBC-ENTMCNC: 33.8 GM/DL — SIGNIFICANT CHANGE UP (ref 32–36)
MCV RBC AUTO: 92.3 FL — SIGNIFICANT CHANGE UP (ref 80–100)
PLATELET # BLD AUTO: 156 K/UL — SIGNIFICANT CHANGE UP (ref 150–400)
POTASSIUM SERPL-MCNC: 4.4 MMOL/L — SIGNIFICANT CHANGE UP (ref 3.5–5.3)
POTASSIUM SERPL-SCNC: 4.4 MMOL/L — SIGNIFICANT CHANGE UP (ref 3.5–5.3)
PROT SERPL-MCNC: 6.8 G/DL — SIGNIFICANT CHANGE UP (ref 6–8.3)
RBC # BLD: 3.96 M/UL — LOW (ref 4.2–5.8)
RBC # FLD: 15.5 % — HIGH (ref 10.3–14.5)
SODIUM SERPL-SCNC: 140 MMOL/L — SIGNIFICANT CHANGE UP (ref 135–145)
WBC # BLD: 8.1 K/UL — SIGNIFICANT CHANGE UP (ref 3.8–10.5)
WBC # FLD AUTO: 8.1 K/UL — SIGNIFICANT CHANGE UP (ref 3.8–10.5)

## 2018-01-11 PROCEDURE — C1894: CPT

## 2018-01-11 PROCEDURE — 93010 ELECTROCARDIOGRAM REPORT: CPT

## 2018-01-11 PROCEDURE — 93458 L HRT ARTERY/VENTRICLE ANGIO: CPT | Mod: 26

## 2018-01-11 PROCEDURE — 80053 COMPREHEN METABOLIC PANEL: CPT

## 2018-01-11 PROCEDURE — 85027 COMPLETE CBC AUTOMATED: CPT

## 2018-01-11 PROCEDURE — C1887: CPT

## 2018-01-11 PROCEDURE — 93005 ELECTROCARDIOGRAM TRACING: CPT

## 2018-01-11 PROCEDURE — 93458 L HRT ARTERY/VENTRICLE ANGIO: CPT

## 2018-01-11 PROCEDURE — C1769: CPT

## 2018-01-11 RX ORDER — RANOLAZINE 500 MG/1
500 TABLET, FILM COATED, EXTENDED RELEASE ORAL ONCE
Qty: 0 | Refills: 0 | Status: DISCONTINUED | OUTPATIENT
Start: 2018-01-11 | End: 2018-01-26

## 2018-01-11 NOTE — H&P CARDIOLOGY - PMH
BPH (benign prostatic hyperplasia)    Hypertension BPH (benign prostatic hyperplasia)    HLD (hyperlipidemia)    Hypertension

## 2018-01-11 NOTE — H&P CARDIOLOGY - HISTORY OF PRESENT ILLNESS
86 Romans[place] M w/ BPH, PPM, HTN presents with mild NELSON.   Pt had a Stress Test on 12/18/17 which showed large, moderate defects in the inferior, basal inferoseptal, and basal to mid inferolateral walls that are partially reversible suggestive of infarct with at least moderate vikas-infarct ischemia. There are medium sized, moderate defects in the mid to distal anteroseptal wall and apex that are mostly fixed suggestive of infarct with mild vikas-infarct ischemia. Post Stress gated wall motion analysis was performed, LVEF 43% revealing hypokinesis of the inferior, basal to mid inferolateral, basal inferoseptal, distal anteroseptal, and apical walls.   Pt was referred for Cardiac Cath by DR Burkett 86 Vietnamese M w/ BPH, PPM, HTN presents with mild NELSON.   Pt had a Stress Test on 12/18/17 which showed large, moderate defects in the inferior, basal inferoseptal, and basal to mid inferolateral walls that are partially reversible suggestive of infarct with at least moderate vikas-infarct ischemia. There are medium sized, moderate defects in the mid to distal anteroseptal wall and apex that are mostly fixed suggestive of infarct with mild vikas-infarct ischemia. Post Stress gated wall motion analysis was performed, LVEF 43% revealing hypokinesis of the inferior, basal to mid inferolateral, basal inferoseptal, distal anteroseptal, and apical walls.   Pt was referred for Cardiac Cath by DR Burkett    Antianginals-Metoprolol and Ranexa 86 Romanian M w/ BPH, PPM, HTN presents with mild NELSON.   Pt had a Stress Test on 12/18/17 which showed large, moderate defects in the inferior, basal inferoseptal, and basal to mid inferolateral walls that are partially reversible suggestive of infarct with at least moderate vikas-infarct ischemia. There are medium sized, moderate defects in the mid to distal anteroseptal wall and apex that are mostly fixed suggestive of infarct with mild vikas-infarct ischemia. Post Stress gated wall motion analysis was performed, LVEF 43% revealing hypokinesis of the inferior, basal to mid inferolateral, basal inferoseptal, distal anteroseptal, and apical walls.   Pt was referred for Cardiac Cath by DR Burkett    PPM interrogated on 12/13/17 showed 12 beats of NSVT.  Antianginals-Metoprolol and Ranexa 86 Amharic M w/ BPH, PPM, HTN, HLD presents with mild NELSON and LE swelling for past 4-5 months.   Pt had a Stress Test on 12/18/17 which showed large, moderate defects in the inferior, basal inferoseptal, and basal to mid inferolateral walls that are partially reversible suggestive of infarct with at least moderate vikas-infarct ischemia. There are medium sized, moderate defects in the mid to distal anteroseptal wall and apex that are mostly fixed suggestive of infarct with mild vikas-infarct ischemia. Post Stress gated wall motion analysis was performed, LVEF 43% revealing hypokinesis of the inferior, basal to mid inferolateral, basal inferoseptal, distal anteroseptal, and apical walls.   Pt was referred for Cardiac Cath by DR Burkett    PPM interrogated on 12/13/17 showed 12 beats of NSVT.  Antianginals-Metoprolol and Ranexa 86 Armenian M w/ BPH, PPM, HTN, HLD, SESAR (does not use CPAP)  presents with mild NELSON and LE swelling for past 4-5 months.   Pt had a Stress Test on 12/18/17 which showed large, moderate defects in the inferior, basal inferoseptal, and basal to mid inferolateral walls that are partially reversible suggestive of infarct with at least moderate vikas-infarct ischemia. There are medium sized, moderate defects in the mid to distal anteroseptal wall and apex that are mostly fixed suggestive of infarct with mild vikas-infarct ischemia. Post Stress gated wall motion analysis was performed, LVEF 43% revealing hypokinesis of the inferior, basal to mid inferolateral, basal inferoseptal, distal anteroseptal, and apical walls.   Pt was referred for Cardiac Cath by DR Burkett    PPM interrogated on 12/13/17 showed 12 beats of NSVT.  Antianginals-Metoprolol and Ranexa

## 2018-01-12 ENCOUNTER — OTHER (OUTPATIENT)
Age: 83
End: 2018-01-12

## 2018-01-15 ENCOUNTER — INPATIENT (INPATIENT)
Facility: HOSPITAL | Age: 83
LOS: 1 days | Discharge: HOME | DRG: 271 | End: 2018-01-17
Attending: STUDENT IN AN ORGANIZED HEALTH CARE EDUCATION/TRAINING PROGRAM | Admitting: INTERNAL MEDICINE
Payer: COMMERCIAL

## 2018-01-15 VITALS
DIASTOLIC BLOOD PRESSURE: 66 MMHG | SYSTOLIC BLOOD PRESSURE: 136 MMHG | HEIGHT: 68 IN | WEIGHT: 191.58 LBS | RESPIRATION RATE: 16 BRPM | TEMPERATURE: 97 F | OXYGEN SATURATION: 96 % | HEART RATE: 64 BPM

## 2018-01-15 DIAGNOSIS — I25.10 ATHEROSCLEROTIC HEART DISEASE OF NATIVE CORONARY ARTERY WITHOUT ANGINA PECTORIS: ICD-10-CM

## 2018-01-15 DIAGNOSIS — N40.0 BENIGN PROSTATIC HYPERPLASIA WITHOUT LOWER URINARY TRACT SYMPTOMS: ICD-10-CM

## 2018-01-15 DIAGNOSIS — Z98.890 OTHER SPECIFIED POSTPROCEDURAL STATES: Chronic | ICD-10-CM

## 2018-01-15 DIAGNOSIS — Z88.8 ALLERGY STATUS TO OTHER DRUGS, MEDICAMENTS AND BIOLOGICAL SUBSTANCES STATUS: ICD-10-CM

## 2018-01-15 DIAGNOSIS — I10 ESSENTIAL (PRIMARY) HYPERTENSION: ICD-10-CM

## 2018-01-15 DIAGNOSIS — Z90.49 ACQUIRED ABSENCE OF OTHER SPECIFIED PARTS OF DIGESTIVE TRACT: Chronic | ICD-10-CM

## 2018-01-15 DIAGNOSIS — Z95.0 PRESENCE OF CARDIAC PACEMAKER: ICD-10-CM

## 2018-01-15 DIAGNOSIS — Z96.643 PRESENCE OF ARTIFICIAL HIP JOINT, BILATERAL: Chronic | ICD-10-CM

## 2018-01-15 LAB
ALBUMIN SERPL ELPH-MCNC: 3.6 G/DL — SIGNIFICANT CHANGE UP (ref 3.3–5)
ALP SERPL-CCNC: 70 U/L — SIGNIFICANT CHANGE UP (ref 40–120)
ALT FLD-CCNC: 9 U/L RC — LOW (ref 10–45)
ANION GAP SERPL CALC-SCNC: 11 MMOL/L — SIGNIFICANT CHANGE UP (ref 5–17)
AST SERPL-CCNC: 16 U/L — SIGNIFICANT CHANGE UP (ref 10–40)
BILIRUB SERPL-MCNC: 0.4 MG/DL — SIGNIFICANT CHANGE UP (ref 0.2–1.2)
BLD GP AB SCN SERPL QL: NEGATIVE — SIGNIFICANT CHANGE UP
BUN SERPL-MCNC: 25 MG/DL — HIGH (ref 7–23)
CALCIUM SERPL-MCNC: 9.1 MG/DL — SIGNIFICANT CHANGE UP (ref 8.4–10.5)
CHLORIDE SERPL-SCNC: 101 MMOL/L — SIGNIFICANT CHANGE UP (ref 96–108)
CHOLEST SERPL-MCNC: 152 MG/DL — SIGNIFICANT CHANGE UP (ref 10–199)
CO2 SERPL-SCNC: 26 MMOL/L — SIGNIFICANT CHANGE UP (ref 22–31)
CREAT SERPL-MCNC: 1.11 MG/DL — SIGNIFICANT CHANGE UP (ref 0.5–1.3)
GLUCOSE SERPL-MCNC: 84 MG/DL — SIGNIFICANT CHANGE UP (ref 70–99)
HBA1C BLD-MCNC: 5.3 % — SIGNIFICANT CHANGE UP (ref 4–5.6)
HCT VFR BLD CALC: 37.2 % — LOW (ref 39–50)
HDLC SERPL-MCNC: 60 MG/DL — SIGNIFICANT CHANGE UP (ref 40–125)
HGB BLD-MCNC: 12.7 G/DL — LOW (ref 13–17)
LIPID PNL WITH DIRECT LDL SERPL: 80 MG/DL — SIGNIFICANT CHANGE UP
MAGNESIUM SERPL-MCNC: 2.1 MG/DL — SIGNIFICANT CHANGE UP (ref 1.6–2.6)
MCHC RBC-ENTMCNC: 31.6 PG — SIGNIFICANT CHANGE UP (ref 27–34)
MCHC RBC-ENTMCNC: 34 GM/DL — SIGNIFICANT CHANGE UP (ref 32–36)
MCV RBC AUTO: 92.8 FL — SIGNIFICANT CHANGE UP (ref 80–100)
PHOSPHATE SERPL-MCNC: 3 MG/DL — SIGNIFICANT CHANGE UP (ref 2.5–4.5)
PLATELET # BLD AUTO: 164 K/UL — SIGNIFICANT CHANGE UP (ref 150–400)
POTASSIUM SERPL-MCNC: 4.3 MMOL/L — SIGNIFICANT CHANGE UP (ref 3.5–5.3)
POTASSIUM SERPL-SCNC: 4.3 MMOL/L — SIGNIFICANT CHANGE UP (ref 3.5–5.3)
PROT SERPL-MCNC: 6.9 G/DL — SIGNIFICANT CHANGE UP (ref 6–8.3)
RBC # BLD: 4.01 M/UL — LOW (ref 4.2–5.8)
RBC # FLD: 15.5 % — HIGH (ref 10.3–14.5)
RH IG SCN BLD-IMP: POSITIVE — SIGNIFICANT CHANGE UP
SODIUM SERPL-SCNC: 138 MMOL/L — SIGNIFICANT CHANGE UP (ref 135–145)
TOTAL CHOLESTEROL/HDL RATIO MEASUREMENT: 2.5 RATIO — LOW (ref 3.4–9.6)
TRIGL SERPL-MCNC: 62 MG/DL — SIGNIFICANT CHANGE UP (ref 10–149)
TSH SERPL-MCNC: 5.61 UIU/ML — HIGH (ref 0.27–4.2)
WBC # BLD: 9.1 K/UL — SIGNIFICANT CHANGE UP (ref 3.8–10.5)
WBC # FLD AUTO: 9.1 K/UL — SIGNIFICANT CHANGE UP (ref 3.8–10.5)

## 2018-01-15 PROCEDURE — 93010 ELECTROCARDIOGRAM REPORT: CPT

## 2018-01-15 PROCEDURE — 99223 1ST HOSP IP/OBS HIGH 75: CPT

## 2018-01-15 RX ORDER — ACETAMINOPHEN 500 MG
650 TABLET ORAL EVERY 6 HOURS
Qty: 0 | Refills: 0 | Status: DISCONTINUED | OUTPATIENT
Start: 2018-01-15 | End: 2018-01-17

## 2018-01-15 RX ORDER — DIPHENHYDRAMINE HCL 50 MG
50 CAPSULE ORAL ONCE
Qty: 0 | Refills: 0 | Status: DISCONTINUED | OUTPATIENT
Start: 2018-01-15 | End: 2018-01-17

## 2018-01-15 RX ORDER — INFLUENZA VIRUS VACCINE 15; 15; 15; 15 UG/.5ML; UG/.5ML; UG/.5ML; UG/.5ML
0.5 SUSPENSION INTRAMUSCULAR ONCE
Qty: 0 | Refills: 0 | Status: DISCONTINUED | OUTPATIENT
Start: 2018-01-15 | End: 2018-01-17

## 2018-01-15 RX ORDER — ATORVASTATIN CALCIUM 80 MG/1
10 TABLET, FILM COATED ORAL AT BEDTIME
Qty: 0 | Refills: 0 | Status: DISCONTINUED | OUTPATIENT
Start: 2018-01-15 | End: 2018-01-17

## 2018-01-15 RX ORDER — ALBUTEROL 90 UG/1
2.5 AEROSOL, METERED ORAL ONCE
Qty: 0 | Refills: 0 | Status: DISCONTINUED | OUTPATIENT
Start: 2018-01-15 | End: 2018-01-17

## 2018-01-15 RX ORDER — EPINEPHRINE 0.3 MG/.3ML
0.3 INJECTION INTRAMUSCULAR; SUBCUTANEOUS ONCE
Qty: 0 | Refills: 0 | Status: DISCONTINUED | OUTPATIENT
Start: 2018-01-16 | End: 2018-01-17

## 2018-01-15 RX ORDER — CLOPIDOGREL BISULFATE 75 MG/1
75 TABLET, FILM COATED ORAL DAILY
Qty: 0 | Refills: 0 | Status: DISCONTINUED | OUTPATIENT
Start: 2018-01-15 | End: 2018-01-17

## 2018-01-15 RX ORDER — GLUCAGON INJECTION, SOLUTION 0.5 MG/.1ML
1 INJECTION, SOLUTION SUBCUTANEOUS ONCE
Qty: 0 | Refills: 0 | Status: DISCONTINUED | OUTPATIENT
Start: 2018-01-16 | End: 2018-01-17

## 2018-01-15 RX ORDER — ASPIRIN/SOD BICARB/CITRIC ACID 324 MG
1 TABLET, EFFERVESCENT ORAL ONCE
Qty: 0 | Refills: 0 | Status: COMPLETED | OUTPATIENT
Start: 2018-01-16 | End: 2018-01-16

## 2018-01-15 RX ORDER — DOXAZOSIN MESYLATE 4 MG
4 TABLET ORAL AT BEDTIME
Qty: 0 | Refills: 0 | Status: DISCONTINUED | OUTPATIENT
Start: 2018-01-15 | End: 2018-01-17

## 2018-01-15 RX ORDER — FINASTERIDE 5 MG/1
5 TABLET, FILM COATED ORAL DAILY
Qty: 0 | Refills: 0 | Status: DISCONTINUED | OUTPATIENT
Start: 2018-01-15 | End: 2018-01-17

## 2018-01-15 RX ADMIN — CLOPIDOGREL BISULFATE 75 MILLIGRAM(S): 75 TABLET, FILM COATED ORAL at 11:35

## 2018-01-15 RX ADMIN — ATORVASTATIN CALCIUM 10 MILLIGRAM(S): 80 TABLET, FILM COATED ORAL at 22:18

## 2018-01-15 RX ADMIN — FINASTERIDE 5 MILLIGRAM(S): 5 TABLET, FILM COATED ORAL at 22:18

## 2018-01-15 RX ADMIN — Medication 4 MILLIGRAM(S): at 22:54

## 2018-01-15 NOTE — H&P ADULT - PROBLEM SELECTOR PLAN 1
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

## 2018-01-15 NOTE — CONSULT NOTE ADULT - PROBLEM SELECTOR RECOMMENDATION 9
Given the history of pruritis only, we recommended graded challenge to 81mg. Daily dosing should not exceed 81mg. Given the history of pruritis only, we recommended graded challenge to 81mg. Daily dosing should not exceed 81mg.    We recommend administering Aspirin as a three step graded challenge, with each dose given after 20 minutes after last dose. First dose 20mg, second dose 40.5mg, and final dose the remainder of pill. Dosage form can be determined by pharmacy. Please have Epipen 0.3mg/0.3mL 1:1000 (to be given IM), Benadryl 50mg, and Glucagon at the bedside. Hold morning dose of beta blocker. Challenge should be done in the morning with adequate staffing. Do not give antihistamines or Singulair before challenge. Given the history of pruritis as only symptom with aspirin 30 years ago, but then tolerance for Disalcid Acid 21 years ago with no h/o allergic reaction, but renal failure, recommend a graded challenge to aspirin as followed. Daily dosing should not exceed 81mg.    We recommend administering Aspirin as a three step graded challenge, with each dose given after 20 minutes (with close monitoring). First dose 20mg, second dose 40.5mg, and final dose the remainder of total dose. Dosage form can be determined by pharmacy. Please have Epipen 0.3mg/0.3mL 1:1000 (to be given IM), Benadryl 50mg, and Glucagon at the bedside. Hold morning dose of beta blocker. Challenge should be done with adequate staffing under close observation. Do not give antihistamines or Singulair before challenge. Given the history of pruritis as only symptom with aspirin 30 years ago, but then tolerance for Disalcid Acid 21 years ago with no h/o allergic reaction, but renal failure, recommend a graded challenge to aspirin as followed if primary team/cardiology considers aspirin as medically necessary and benefits of treatment with aspirin outweigh risks of possible allergic reaction/anaphylaxis. Daily dosing should not exceed 81mg. Consent has to be obtained by the primary team, who is conducting the challenge.    We recommend administering Aspirin as a three step graded challenge, with each dose given after 20 minutes with close monitoring (vitals, physical exam) in between. First dose 20mg, second dose 40.5mg, and final dose the remainder. Dosage form can be determined by pharmacy. Please have Epipen 0.3mg/0.3mL 1:1000 (to be given IM), Benadryl 50mg, and Glucagon at the bedside. Hold morning dose of beta blocker prior to challenge. Challenge should be done with adequate staffing under close observation. Do not give antihistamines or Singulair before challenge. Given the history of pruritis as only symptom with aspirin 30 years ago, but then tolerance for Disalcid Acid 21 years ago with no h/o allergic reaction, but renal failure, recommend a graded challenge to aspirin as followed if primary team/cardiology considers aspirin as medically necessary with benefits of treatment with aspirin outweighing risks of possible allergic reaction/anaphylaxis. Daily dosing should not exceed 81mg. Consent has to be obtained by the primary team, who is conducting the challenge.    We recommend administering Aspirin as a three step graded challenge, with each dose given after 20 minutes with close monitoring (vitals, physical exam) in between. First dose 20mg, second dose 40.5mg, and final dose the remainder. Dosage form can be determined by pharmacy. Please have Epipen 0.3mg/0.3mL 1:1000 (to be given IM), Benadryl 50mg, and Glucagon at the bedside. Hold morning dose of beta blocker prior to challenge. Challenge should be done with adequate staffing under close observation. Do not give antihistamines or Singulair before challenge. Given the history of pruritis as only symptom with aspirin 30 years ago, but then tolerance for Disalcid Acid 21 years ago with no h/o allergic reaction, but renal failure, recommend a graded challenge to aspirin as followed if primary team/cardiology considers aspirin as medically necessary with benefits of treatment with aspirin outweighing risks of possible allergic reaction/anaphylaxis. Daily dosing should not exceed 81mg. Consent has to be obtained by the primary team, who is conducting the challenge.    We recommend administering Aspirin as a three step graded challenge, with each dose given after 20 minutes with close monitoring (vitals, physical exam) in between. First dose 20mg, if asymptomatic with stable vitals give second dose 40.5mg, if asymptomatic with stable vitals proceed to final dose the remainder. Dosage form can be determined by pharmacy. Please have Epipen 0.3mg/0.3mL 1:1000 (to be given IM), Benadryl 50mg, and Glucagon at the bedside. Hold morning dose of beta blocker prior to challenge. Challenge should be done with adequate staffing under close observation. Do not give antihistamines or Singulair before challenge. If the patient passes the challenge, subsequent doses can be given as 81mg daily.

## 2018-01-15 NOTE — H&P ADULT - PMH
BPH (benign prostatic hyperplasia)    HLD (hyperlipidemia)    Hypertension    Pacemaker  [employer]

## 2018-01-15 NOTE — H&P ADULT - NSHPPHYSICALEXAM_GEN_ALL_CORE
Constitutional:    HEENT:    Respiratory:    Cardiovascular:    Gastrointestinal:    Genitourinary:    Extremities:    Vascular:    Neurological:    Skin: Constitutional: Well developed, well nourished, NAD  HEENT: wearing glasses, oral mucosa pink moist; sclera clear  Respiratory: Regular unlabored CTA  Cardiovascular: S1 S2, no M/R/G; no edema  Gastrointestinal: soft ND/NT; + bowel sounds  Genitourinary: voiding on own; yellow urine  Extremities: MILLS equal strength, arthritis changes bilat hands  Vascular: warm peripherally, + DP bilat  Neurological: A & O x3 mood & affect appropriate  Skin: warm dry intact; no rash, cyanosis or ecchymosis

## 2018-01-15 NOTE — H&P ADULT - NSHPSOCIALHISTORY_GEN_ALL_CORE
lives with spouse and daughter  retired   Non smoker - never smoked; no recreational drugs  Social drinker - 1-2 x week a beer, wine or cognac

## 2018-01-15 NOTE — H&P ADULT - HISTORY OF PRESENT ILLNESS
This is an 87 year old Maltese speaking male with PMH - HTN, PPM ([place] [facility] 2/21/17) CAD (diag cath 1/11/18 LM 50%; pLAD 90%, mLAD 80%,Cx 85%; OM1 30%; mRCA 100%) HLD, BPH, SESAR (does not use CPAP) who presents today for aspirin desensitization due to aspirin allergy (anaphylaxis nearly 30 years ago no aspirin products since that time) for high risk PCI after desensitized.

## 2018-01-15 NOTE — H&P ADULT - NSHPREVIEWOFSYSTEMS_GEN_ALL_CORE
REVIEW OF SYSTEMS  CONSTITUTIONAL:  No weight loss, fever, chills, weakness or fatigue.  HEENT:  Eyes:  No blurred vision, wears glasses for distance, Ears, Nose, Throat:  + hard of hearing (has hearing aid does not wear), No nasal congestion, runny nose or sore throat.  SKIN:  No rash or itching.  CARDIOVASCULAR:  No chest pain, chest pressure or chest discomfort. No palpitations or edema.  RESPIRATORY:  No cough or sputum. + shortness of breath with climbing stairs  GASTROINTESTINAL:  No anorexia, nausea, vomiting or diarrhea. No abdominal pain or blood.  GENITOURINARY:  No burning on urination.   NEUROLOGICAL:  No headache, dizziness, syncope, numbness or tingling in the extremities.   MUSCULOSKELETAL:  No muscle pain, + arthritis pain right shoulder, hands and feet.  HEMATOLOGIC:  No anemia, bleeding or bruising.  LYMPHATICS:  No enlarged nodes. No history of splenectomy.  PSYCHIATRIC:  No history of depression or anxiety.  ENDOCRINOLOGIC:  No reports of sweating, cold or heat intolerance. No polyuria or polydipsia.  ALLERGIES:  No history of asthma, eczema or rhinitis.

## 2018-01-15 NOTE — H&P ADULT - PROBLEM SELECTOR PLAN 3
Monitor VS  Hold BB for aspirin desensitization  Consider hydralazine prn for BP Monitor VS  Hold BB for aspirin desensitization

## 2018-01-15 NOTE — CONSULT NOTE ADULT - SUBJECTIVE AND OBJECTIVE BOX
Patient is a 86y old  Male who presents with a chief complaint of Aspirin desensitization prior to high risk PCI.    HPI:   87 year old Occitan speaking male with PMH - HTN, PPM ([place] [facility] 2/21/17) CAD (diag cath 1/11/18 LM 50%; pLAD 90%, mLAD 80%,Cx 85%; OM1 30%; mRCA 100%) HLD, BPH, SESAR (does not use CPAP) who presents today for aspirin desensitization due to aspirin allergy for high risk PCI after desensitized.      He reports full body hives after taking Kacie [place], 15min after exposure. He went to the ER and was given steroids. Denies difficulty breathing, wheezing, vomiting. In 1996, he was prescribed Disalcid Acid in the setting of a viral illness. After one month, he developed renal failure but never had IgE mediated reaction such as hives, SOB, vomiting. He avoided all other NSAIDS. Plan is to give 81mg aspirin prior to cath. He has intermittent asthma and has not required Albuterol for several years. Denies chronic urticaria, sinusitis, angioedema.     Allergies  aspirin (Nephrotoxicity; Hives)  nonsteroidal anti-inflammatory agents (Nephrotoxicity)  shellfish (Rash)    MEDICATIONS  (STANDING):  atorvastatin 10 milliGRAM(s) Oral at bedtime  clopidogrel Tablet 75 milliGRAM(s) Oral daily  doxazosin 4 milliGRAM(s) Oral at bedtime  finasteride 5 milliGRAM(s) Oral daily  influenza   Vaccine 0.5 milliLiter(s) IntraMuscular once    MEDICATIONS  (PRN):      Vital Signs Last 24 Hrs  T(C): 36.1 (15 Blair 2018 08:09), Max: 36.1 (15 Blair 2018 08:09)  T(F): 97 (15 Blair 2018 08:09), Max: 97 (15 Blair 2018 08:09)  HR: 61 (15 Blair 2018 12:00) (60 - 68)  BP: 121/54 (15 Blair 2018 12:00) (121/54 - 140/63)  BP(mean): 75 (15 Blair 2018 12:00) (74 - 87)  RR: 17 (15 Blair 2018 12:00) (16 - 18)  SpO2: 91% (15 Blair 2018 12:00) (91% - 97%)        Constitutional: Well developed, well nourished, NAD  	HEENT: wearing glasses, oral mucosa pink moist; sclera clear, EOMI    Neck: Shoddy cervical nodes bilaterally  	Respiratory: Regular unlabored CTA  	Cardiovascular: S1 S2, no M/R/G  	Gastrointestinal: soft ND/NT  	Extremities: Full ROM all extremities  	Neurological: A & O x3 mood & affect appropriate    Skin: warm dry intact; no rash, cyanosis or ecchymosis    Lab Results:                        12.7   9.1   )----------- Patient is a 86y old  Male who presents with a chief complaint of Aspirin desensitization prior to high risk PCI.    HPI:   87 year old Urdu speaking male with PMH - HTN, PPM ([place] [facility] 2/21/17) CAD (diag cath 1/11/18 LM 50%; pLAD 90%, mLAD 80%,Cx 85%; OM1 30%; mRCA 100%) HLD, BPH, SESAR (does not use CPAP) who presents today for aspirin desensitization due to aspirin allergy for high risk PCI after desensitized.      He reports full body hives after taking Kacie [place], 15min after exposure. He went to the ER and was given steroids. Denies difficulty breathing, wheezing, vomiting. In 1996, he was prescribed Disalcid Acid in the setting of a viral illness. After one month, he developed renal failure but never had IgE mediated reaction such as hives, SOB, vomiting. He avoided all other NSAIDS. Plan is to give 81mg aspirin prior to cath. He has intermittent asthma and has not required Albuterol for several years. Denies chronic urticaria, sinusitis, angioedema.     Allergies  aspirin (Nephrotoxicity; Hives)  nonsteroidal anti-inflammatory agents (Nephrotoxicity)  shellfish (Rash)    MEDICATIONS  (STANDING):  atorvastatin 10 milliGRAM(s) Oral at bedtime  clopidogrel Tablet 75 milliGRAM(s) Oral daily  doxazosin 4 milliGRAM(s) Oral at bedtime  finasteride 5 milliGRAM(s) Oral daily  influenza   Vaccine 0.5 milliLiter(s) IntraMuscular once    MEDICATIONS  (PRN):      Vital Signs Last 24 Hrs  T(C): 36.1 (15 Blair 2018 08:09), Max: 36.1 (15 Blair 2018 08:09)  T(F): 97 (15 Blair 2018 08:09), Max: 97 (15 Blair 2018 08:09)  HR: 61 (15 Blair 2018 12:00) (60 - 68)  BP: 121/54 (15 Blair 2018 12:00) (121/54 - 140/63)  BP(mean): 75 (15 Blair 2018 12:00) (74 - 87)  RR: 17 (15 Blair 2018 12:00) (16 - 18)  SpO2: 91% (15 Blair 2018 12:00) (91% - 97%)        Constitutional: Well developed, well nourished, NAD  	HEENT: wearing glasses, oral mucosa pink moist; sclera clear, EOMI    Neck: Shoddy cervical nodes bilaterally  	Respiratory: Regular unlabored CTA  	Cardiovascular: S1 S2, no M/R/G  	Gastrointestinal: soft ND/NT  	Extremities: Full ROM all extremities  	Neurological: A & O x3 mood & affect appropriate    Skin: warm dry intact; no rash, cyanosis or ecchymosis    Lab Results:                        12.7   9.1   )-----------( 164      ( 15 Blair 2018 09:54 )             37.2 Patient is a 86y old  Male who presents with a chief complaint of Aspirin desensitization prior to high risk PCI.    HPI:   87 year old Kazakh speaking male with PMH - HTN, PPM ([place] [facility] 2/21/17) CAD (diag cath 1/11/18 LM 50%; pLAD 90%, mLAD 80%,Cx 85%; OM1 30%; mRCA 100%) HLD, BPH, SESAR (does not use CPAP) who presents today for aspirin desensitization due to aspirin allergy for high risk PCI after desensitized.      He reports full body hives after taking Kacie [place], 15min after exposure. He went to the ER and was given steroids. Denies difficulty breathing, wheezing, vomiting. In 1996, he was prescribed Disalcid Acid in the setting of a viral illness. After one month, he developed renal failure but never had IgE mediated reaction such as hives, SOB, vomiting. He avoided all other NSAIDS. Plan is to give 81mg aspirin prior to cath. He has intermittent asthma and has not required Albuterol for several years. Denies chronic urticaria, sinusitis, angioedema.     Review of Systems: REVIEW OF SYSTEMS  	CONSTITUTIONAL:  No weight loss, fever, chills, weakness or fatigue.  	HEENT:  Eyes:  No blurred vision, wears glasses for distance. No congestion.   	SKIN:  No rash or itching.  	CARDIOVASCULAR:  No chest pain, chest pressure or chest discomfort. No palpitations or edema.  	RESPIRATORY:  No cough or sputum. +SOB with climbing stairs  	GASTROINTESTINAL:  No anorexia, nausea, vomiting or diarrhea. No abdominal pain or blood.  	GENITOURINARY:  No burning on urination.   	NEUROLOGICAL:  No headache, dizziness, syncope, numbness or tingling in the extremities.   	MUSCULOSKELETAL:  No muscle pain, + arthritis pain right shoulder, hands and feet.  	HEMATOLOGIC:  No anemia, bleeding or bruising.  	LYMPHATICS:  No enlarged nodes. No history of splenectomy.  	PSYCHIATRIC:  No history of depression or anxiety.  ALLERGIES:  As per HPI.    Allergies  aspirin (Nephrotoxicity; Hives)  nonsteroidal anti-inflammatory agents (Nephrotoxicity)  shellfish (Rash)    MEDICATIONS  (STANDING):  atorvastatin 10 milliGRAM(s) Oral at bedtime  clopidogrel Tablet 75 milliGRAM(s) Oral daily  doxazosin 4 milliGRAM(s) Oral at bedtime  finasteride 5 milliGRAM(s) Oral daily  influenza   Vaccine 0.5 milliLiter(s) IntraMuscular once      Vital Signs Last 24 Hrs  T(C): 36.1 (15 Blair 2018 08:09), Max: 36.1 (15 Blair 2018 08:09)  T(F): 97 (15 Blair 2018 08:09), Max: 97 (15 Blair 2018 08:09)  HR: 61 (15 Blair 2018 12:00) (60 - 68)  BP: 121/54 (15 Blair 2018 12:00) (121/54 - 140/63)  BP(mean): 75 (15 Blair 2018 12:00) (74 - 87)  RR: 17 (15 Blair 2018 12:00) (16 - 18)  SpO2: 91% (15 Blair 2018 12:00) (91% - 97%)        Constitutional: Well developed, well nourished, NAD  	HEENT: wearing glasses, oral mucosa pink moist; sclera clear, EOMI    Neck: Shoddy cervical nodes bilaterally  	Respiratory: Regular unlabored CTA  	Cardiovascular: S1 S2, no M/R/G  	Gastrointestinal: soft ND/NT  	Extremities: Full ROM all extremities  	Neurological: A & O x3 mood & affect appropriate    Skin: warm dry intact; no rash, cyanosis or ecchymosis    Lab Results:                        12.7   9.1   )-----------( 164      ( 15 Blair 2018 09:54 )             37.2 Patient is a 86y old  Male who presents with a chief complaint of Aspirin desensitization prior to high risk PCI.    HPI:   87 year old Urdu speaking male with PMH - HTN, PPM ([place] [facility] 2/21/17) CAD (diag cath 1/11/18 LM 50%; pLAD 90%, mLAD 80%,Cx 85%; OM1 30%; mRCA 100%) HLD, BPH, SESAR (does not use CPAP) who presents today for aspirin desensitization due to aspirin allergy for high risk PCI after desensitized.      He reports full body pruritis after taking Kacie [place], 15min after exposure. He went to the ER and was given steroids. Denies difficulty breathing, wheezing, vomiting. In 1996, he was prescribed Disalcid Acid in the setting of a viral illness. After one month, he developed renal failure but never had IgE mediated reaction such as hives, SOB, vomiting. He avoided all other NSAIDS. Plan is to give 81mg aspirin prior to cath. He has intermittent asthma and has not required Albuterol for several years. Denies chronic urticaria, sinusitis, angioedema.     Review of Systems: REVIEW OF SYSTEMS  	CONSTITUTIONAL:  No weight loss, fever, chills, weakness or fatigue.  	HEENT:  Eyes:  No blurred vision, wears glasses for distance. No congestion.   	SKIN:  No rash or itching.  	CARDIOVASCULAR:  No chest pain, chest pressure or chest discomfort. No palpitations or edema.  	RESPIRATORY:  No cough or sputum. +SOB with climbing stairs  	GASTROINTESTINAL:  No anorexia, nausea, vomiting or diarrhea. No abdominal pain or blood.  	GENITOURINARY:  No burning on urination.   	NEUROLOGICAL:  No headache, dizziness, syncope, numbness or tingling in the extremities.   	MUSCULOSKELETAL:  No muscle pain, + arthritis pain right shoulder, hands and feet.  	HEMATOLOGIC:  No anemia, bleeding or bruising.  	LYMPHATICS:  No enlarged nodes. No history of splenectomy.  	PSYCHIATRIC:  No history of depression or anxiety.  ALLERGIES:  As per HPI.    Allergies  aspirin (Nephrotoxicity; Hives)  nonsteroidal anti-inflammatory agents (Nephrotoxicity)  shellfish (Rash)    MEDICATIONS  (STANDING):  atorvastatin 10 milliGRAM(s) Oral at bedtime  clopidogrel Tablet 75 milliGRAM(s) Oral daily  doxazosin 4 milliGRAM(s) Oral at bedtime  finasteride 5 milliGRAM(s) Oral daily  influenza   Vaccine 0.5 milliLiter(s) IntraMuscular once      Vital Signs Last 24 Hrs  T(C): 36.1 (15 Blair 2018 08:09), Max: 36.1 (15 Blair 2018 08:09)  T(F): 97 (15 Blair 2018 08:09), Max: 97 (15 Blair 2018 08:09)  HR: 61 (15 Blair 2018 12:00) (60 - 68)  BP: 121/54 (15 Blair 2018 12:00) (121/54 - 140/63)  BP(mean): 75 (15 Blair 2018 12:00) (74 - 87)  RR: 17 (15 Blair 2018 12:00) (16 - 18)  SpO2: 91% (15 Blair 2018 12:00) (91% - 97%)        Constitutional: Well developed, well nourished, NAD  	HEENT: wearing glasses, oral mucosa pink moist; sclera clear, EOMI    Neck: Shoddy cervical nodes bilaterally  	Respiratory: Regular unlabored CTA  	Cardiovascular: S1 S2, no M/R/G  	Gastrointestinal: soft ND/NT  	Extremities: Full ROM all extremities  	Neurological: A & O x3 mood & affect appropriate    Skin: warm dry intact; no rash, cyanosis or ecchymosis    Lab Results:                        12.7   9.1   )-----------( 164      ( 15 Blair 2018 09:54 )             37.2 Patient is a 86y old  Male who presents with a chief complaint of possible aspirin allergy prior to high risk PCI.    HPI:   87 year old Wolof speaking male with PMH - HTN, PPM ([place] [facility] 2/21/17) CAD (dia cath 1/11/18 LM 50%; pLAD 90%, mLAD 80%,Cx 85%; OM1 30%; mRCA 100%) HLD, BPH, SESAR (does not use CPAP) who presents today for aspirin challenge or desensitization due to concern for possible aspirin allergy for high risk PCI after desensitized.      He reports full body pruritis after taking Kacie [place], 15min after exposure about 30 years ago. He went to the ER and was given steroids. Denies h/o associated difficulty breathing, wheezing, cough, angioedema, vomiting or abdominal pain. In 1996 (21 year ago), he was prescribed Disalcid Acid in the setting of a viral illness. One month into treatment with Disalcid Acid, he developed renal failure but never had symptoms of an IgE-mediated allergic reaction such as hives, respiratory symptoms, angioedema, abdominal pain or vomiting. He avoided all other NSAIDS. The primary team plans to treat the patient with 81mg aspirin prior to cath. He has h/o intermittent asthma and has not required Albuterol for several years. Denies h/o chronic urticaria, sinusitis, angioedema, nasal polyps/difficulty with smell.     Review of Systems: REVIEW OF SYSTEMS  	CONSTITUTIONAL:  No weight loss, fever, chills, weakness or fatigue.  	HEENT:  Eyes:  No blurred vision, wears glasses for distance. No congestion.   	SKIN:  No rash or itching.  	CARDIOVASCULAR:  No chest pain, chest pressure or chest discomfort. No palpitations or edema.  	RESPIRATORY:  No cough or sputum. +SOB with climbing stairs  	GASTROINTESTINAL:  No anorexia, nausea, vomiting or diarrhea. No abdominal pain or blood.  	GENITOURINARY:  No burning on urination.   	NEUROLOGICAL:  No headache, dizziness, syncope, numbness or tingling in the extremities.   	MUSCULOSKELETAL:  No muscle pain, + arthritis pain right shoulder, hands and feet.  	HEMATOLOGIC:  No anemia, bleeding or bruising.  	LYMPHATICS:  No enlarged nodes. No history of splenectomy.  	PSYCHIATRIC:  No history of depression or anxiety.  ALLERGIES:  As per HPI.    Allergies  aspirin (Nephrotoxicity; Hives)  nonsteroidal anti-inflammatory agents (Nephrotoxicity)  shellfish (Rash)    MEDICATIONS  (STANDING):  atorvastatin 10 milliGRAM(s) Oral at bedtime  clopidogrel Tablet 75 milliGRAM(s) Oral daily  doxazosin 4 milliGRAM(s) Oral at bedtime  finasteride 5 milliGRAM(s) Oral daily  influenza   Vaccine 0.5 milliLiter(s) IntraMuscular once      Vital Signs Last 24 Hrs  T(C): 36.1 (15 Blair 2018 08:09), Max: 36.1 (15 Blair 2018 08:09)  T(F): 97 (15 Blair 2018 08:09), Max: 97 (15 Blair 2018 08:09)  HR: 61 (15 Blair 2018 12:00) (60 - 68)  BP: 121/54 (15 Blair 2018 12:00) (121/54 - 140/63)  BP(mean): 75 (15 Blair 2018 12:00) (74 - 87)  RR: 17 (15 Blair 2018 12:00) (16 - 18)  SpO2: 91% (15 Blair 2018 12:00) (91% - 97%)        Constitutional: Well developed, well nourished, NAD  	HEENT: wearing glasses, oral mucosa pink moist; sclera clear, EOMI    Neck: Shoddy cervical nodes bilaterally  	Respiratory: Regular unlabored CTA  	Cardiovascular: S1 S2, no M/R/G  	Gastrointestinal: soft ND/NT  	Extremities: Full ROM all extremities  	Neurological: A & O x3 mood & affect appropriate    Skin: warm dry intact; no rash, cyanosis or ecchymosis    Lab Results:                        12.7   9.1   )-----------( 164      ( 15 Blair 2018 09:54 )             37.2

## 2018-01-15 NOTE — CHART NOTE - NSCHARTNOTEFT_GEN_A_CORE
====================  CCU MIDNIGHT ROUNDS  ====================    RICHARD MONTANO  [id number]    ====================  SUMMARY:  87 year old Slovenian speaking male with PMH - HTN, PPM ([place] [facility] 2/21/17) CAD (diag cath 1/11/18 LM 50%; pLAD 90%, mLAD 80%,Cx 85%; OM1 30%; mRCA 100%) HLD, BPH, SESAR (does not use CPAP) who presents today for aspirin desensitization due to aspirin allergy (anaphylaxis nearly 30 years ago no aspirin products since that time) for high risk PCI after desensitized. (15 Blair 2018 09:52)  ====================    ====================  NEW EVENTS: No acute events. Pt could not start ASA challenge today due to taking BB yesterday. Will start ASA challenge in AM  ====================    ====================  VITALS (Last 12 hrs):  ====================    T(C): 36.6 (01-15-18 @ 19:00), Max: 36.8 (01-15-18 @ 16:00)  HR: 62 (01-15-18 @ 19:00) (60 - 68)  BP: 150/63 (01-15-18 @ 19:00) (116/63 - 150/63)  BP(mean): 90 (01-15-18 @ 19:00) (70 - 90)  RR: 20 (01-15-18 @ 19:00) (16 - 20)  SpO2: 97% (01-15-18 @ 19:00) (91% - 98%)    TELEMETRY: AV paced 60    I&O's Summary    15 Blair 2018 07:01  -  15 Blair 2018 20:27  --------------------------------------------------------  IN: 320 mL / OUT: 950 mL / NET: -630 mL    ====================  PLAN:  ====================  HEALTH ISSUES - PROBLEM Dx:  BPH (benign prostatic hyperplasia): Continue finesterise, alfuzosin  CAD (coronary artery disease) w/ TVD: Continue plavix, lipitor. ECHO in AM. Cath in AM w/ IABP assistance.   Aspirin allergy: Likely not true allergy/ Will start ASA challenge in AM    ESTHER Phillips NP   # [id number]

## 2018-01-15 NOTE — H&P ADULT - ASSESSMENT
86 y/o Chinese speaking male PMH - HTN, PPM, CAD (diag cath 1/11/18), HLD, BPH, SESAR (does not use CPAP) who presents today for aspirin desensitization due to aspirin allergy (anaphylaxis nearly 30 years ago no aspirin products since that time) for high risk PCI after desensitized

## 2018-01-15 NOTE — CONSULT NOTE ADULT - ASSESSMENT
87 year old Tanzanian speaking male with PMH - HTN, PPM, CAD, HLD, BPH, SESAR presenting for PCI for triple vessel disease, in the setting of diffuse hives 30 years ago to NSAID exposure. 87 year old Khmer speaking male with PMH - HTN, PPM, CAD, HLD, BPH, SESAR presenting for PCI for triple vessel disease, in the setting of diffuse hives 30 years ago to NSAID exposure.     Aspirin is the preferred drug. At this time we recommend desensitization in the ICU setting. Patient should be consented prior to desensitization. Please make sure consent states that benefits outweighs risks for this patient.     Aspirin Desensitization Protocol  Mix one Kacie[place] tablet (325 mg ASA) with 325 cc sterile water (=1mg/1cc)  Give an ASA dose every 20 minutes, as below.    Dose #	Time	Volume	Dose	Cum. Dose		    Exam				  					BP	HR	O2 sats	RR	Skin	Lungs	  Baseline											  1		0.1 mL	0.1 mg	0.1 mg							  2		0.2 mL	0.2 mg	0.3 mg							  3		0.7 mL	0.7 mg	1 mg							  4		2 mL	2 mg	3 mg							  5		7 mL	7 mg	10 mg							  6		10 mL	10 mg	20 mg							  7		20 mL	20 mg	40 mg							  8		41 mL	41 mg	81 mg							      Special Instruction:  *Procedure can be done on the floor if staffing allows; strongly advise that this should be done during the day with full staffing available.  *Patient must be consented for this procedure by team prior to desensitization  *Ideally, beta blockers should be held prior to procedure if appropriate, as they can interfere with efficacy of epinephrine.   *In a patient on beta blockers who is not responding to epinephrine, the treatment is glucagon 1-5mg IV over 5 minutes  *Record vital signs and exam prior to each dose.  *Keep patient in a monitored setting for 24 hours.  *Patient must be educated that once he has been desensitized to aspirin, he must continue to take aspirin on a daily basis, or risk becoming resensitized.  *Once desensitized, patient should not take an aspirin dose exceeding 81mg.  *If a higher dose is required in the future, please contact Allergy/Immunology for further evaluation.  Aspirin allergy label should remain on patient's allergy list, even if he continues daily Aspirin 81 mg.    Treatment of Reactions:  *If reactions occur, temporarily halt the infusion and treat as appropriate: Benadryl for pruritis/rash, albuterol for wheeze, epinephrine (0.3mg 1:1000) for systemic symptoms. Resume the protocol by repeating the same dose where the reaction occurred.  *The protocol should be aborted if the patient develops hypotension and/or laryngeal edema that are not immediately responsive to IM epinephrine  *In case of anaphylaxis, patient should be treated with 0.3 cc 1:1000 Epinephrine IM immediately. Symptoms of anaphylaxis include respiratory distress, hypotension, and/or involvement of 2 organ systems. 87 year old Icelandic speaking male with PMH - HTN, PPM, CAD, HLD, BPH, SESAR presenting for PCI for triple vessel disease, in the setting of diffuse pruritis 30 years ago to NSAID exposure. 87 year old Algerian speaking male with PMH - HTN, PPM, CAD, HLD, BPH, SESAR presenting for PCI for triple vessel disease, in the setting of diffuse pruritis 30 years ago to NSAID exposure. 21 year ago, he was able to take Disalcid Acid, and one month into treatment with Disalcid Acid, he developed renal failure but denies symptoms of an IgE-mediated allergic reaction.

## 2018-01-16 ENCOUNTER — TRANSCRIPTION ENCOUNTER (OUTPATIENT)
Age: 83
End: 2018-01-16

## 2018-01-16 LAB
ALBUMIN SERPL ELPH-MCNC: 3.1 G/DL — LOW (ref 3.3–5)
ALP SERPL-CCNC: 65 U/L — SIGNIFICANT CHANGE UP (ref 40–120)
ALT FLD-CCNC: 9 U/L RC — LOW (ref 10–45)
ANION GAP SERPL CALC-SCNC: 13 MMOL/L — SIGNIFICANT CHANGE UP (ref 5–17)
APTT BLD: 29 SEC — SIGNIFICANT CHANGE UP (ref 27.5–37.4)
AST SERPL-CCNC: 16 U/L — SIGNIFICANT CHANGE UP (ref 10–40)
BILIRUB SERPL-MCNC: 0.5 MG/DL — SIGNIFICANT CHANGE UP (ref 0.2–1.2)
BUN SERPL-MCNC: 16 MG/DL — SIGNIFICANT CHANGE UP (ref 7–23)
CALCIUM SERPL-MCNC: 9 MG/DL — SIGNIFICANT CHANGE UP (ref 8.4–10.5)
CHLORIDE SERPL-SCNC: 104 MMOL/L — SIGNIFICANT CHANGE UP (ref 96–108)
CO2 SERPL-SCNC: 24 MMOL/L — SIGNIFICANT CHANGE UP (ref 22–31)
CREAT SERPL-MCNC: 0.94 MG/DL — SIGNIFICANT CHANGE UP (ref 0.5–1.3)
GLUCOSE SERPL-MCNC: 90 MG/DL — SIGNIFICANT CHANGE UP (ref 70–99)
HCT VFR BLD CALC: 38.4 % — LOW (ref 39–50)
HGB BLD-MCNC: 12.6 G/DL — LOW (ref 13–17)
INR BLD: 1.19 RATIO — HIGH (ref 0.88–1.16)
MAGNESIUM SERPL-MCNC: 2 MG/DL — SIGNIFICANT CHANGE UP (ref 1.6–2.6)
MCHC RBC-ENTMCNC: 30.7 PG — SIGNIFICANT CHANGE UP (ref 27–34)
MCHC RBC-ENTMCNC: 32.8 GM/DL — SIGNIFICANT CHANGE UP (ref 32–36)
MCV RBC AUTO: 93.5 FL — SIGNIFICANT CHANGE UP (ref 80–100)
PHOSPHATE SERPL-MCNC: 3.2 MG/DL — SIGNIFICANT CHANGE UP (ref 2.5–4.5)
PLATELET # BLD AUTO: 152 K/UL — SIGNIFICANT CHANGE UP (ref 150–400)
POTASSIUM SERPL-MCNC: 4.1 MMOL/L — SIGNIFICANT CHANGE UP (ref 3.5–5.3)
POTASSIUM SERPL-SCNC: 4.1 MMOL/L — SIGNIFICANT CHANGE UP (ref 3.5–5.3)
PROT SERPL-MCNC: 6.5 G/DL — SIGNIFICANT CHANGE UP (ref 6–8.3)
PROTHROM AB SERPL-ACNC: 12.9 SEC — HIGH (ref 9.8–12.7)
RBC # BLD: 4.1 M/UL — LOW (ref 4.2–5.8)
RBC # FLD: 15.2 % — HIGH (ref 10.3–14.5)
SODIUM SERPL-SCNC: 141 MMOL/L — SIGNIFICANT CHANGE UP (ref 135–145)
T3 SERPL-MCNC: 76 NG/DL — LOW (ref 80–200)
T4 AB SER-ACNC: 5 UG/DL — SIGNIFICANT CHANGE UP (ref 4.6–12)
WBC # BLD: 8.6 K/UL — SIGNIFICANT CHANGE UP (ref 3.8–10.5)
WBC # FLD AUTO: 8.6 K/UL — SIGNIFICANT CHANGE UP (ref 3.8–10.5)

## 2018-01-16 PROCEDURE — 92928 PRQ TCAT PLMT NTRAC ST 1 LES: CPT | Mod: LC,59

## 2018-01-16 PROCEDURE — 92978 ENDOLUMINL IVUS OCT C 1ST: CPT | Mod: 26,LD

## 2018-01-16 PROCEDURE — 99233 SBSQ HOSP IP/OBS HIGH 50: CPT | Mod: GC

## 2018-01-16 PROCEDURE — 92933 PRQ TRLML C ATHRC ST ANGIOP1: CPT | Mod: LD

## 2018-01-16 PROCEDURE — 92934: CPT | Mod: LM

## 2018-01-16 PROCEDURE — 93306 TTE W/DOPPLER COMPLETE: CPT | Mod: 26

## 2018-01-16 PROCEDURE — 93010 ELECTROCARDIOGRAM REPORT: CPT

## 2018-01-16 RX ORDER — ASPIRIN/CALCIUM CARB/MAGNESIUM 324 MG
81 TABLET ORAL DAILY
Qty: 0 | Refills: 0 | Status: DISCONTINUED | OUTPATIENT
Start: 2018-01-17 | End: 2018-01-17

## 2018-01-16 RX ORDER — METOPROLOL TARTRATE 50 MG
50 TABLET ORAL DAILY
Qty: 0 | Refills: 0 | Status: DISCONTINUED | OUTPATIENT
Start: 2018-01-16 | End: 2018-01-17

## 2018-01-16 RX ADMIN — Medication 4 MILLIGRAM(S): at 21:27

## 2018-01-16 RX ADMIN — CLOPIDOGREL BISULFATE 75 MILLIGRAM(S): 75 TABLET, FILM COATED ORAL at 10:18

## 2018-01-16 RX ADMIN — FINASTERIDE 5 MILLIGRAM(S): 5 TABLET, FILM COATED ORAL at 21:27

## 2018-01-16 RX ADMIN — Medication 1 TABLET(S): at 07:35

## 2018-01-16 RX ADMIN — ATORVASTATIN CALCIUM 10 MILLIGRAM(S): 80 TABLET, FILM COATED ORAL at 21:27

## 2018-01-16 RX ADMIN — Medication 50 MILLIGRAM(S): at 18:13

## 2018-01-16 NOTE — DISCHARGE NOTE ADULT - HOSPITAL COURSE
This is an 87 year old Occitan speaking male with PMH - HTN, PPM ([place] [facility] 2/21/17) presented for cardiac catheterization due to a positive NST  a CAD (diag cath 1/11/18 LM 50%; pLAD 90%, mLAD 80%,Cx 85%; OM1 30%; mRCA 100%).  Pt refused CABG  and underwent high risk intervention on 1/17 for HAYDER to Left main.  HAYDER to CX and 2 HAYDER to m LAD .. EF was 40% on echocardiogram .  Pt also underwent asa desensitation.  He was discharged in stable condition with education and follow up instructions

## 2018-01-16 NOTE — DISCHARGE NOTE ADULT - PLAN OF CARE
Will remain free of chest pain Do Not Stop taking Aspirin or Plavix without speaking with your cardiologist  No heavy lifting, strenuous activity, bending, straining or unnecessary stair climbing  for 2 weeks. You may shower 24 hours following procedure but avoid baths and swimming for 1 week. Check groin site for bleeding and/or swelling daily following procedure. Call your doctor/cardiologist immediately should it occur or if you have increased/persistent pain at the site. Follow up with your cardiologist in 1- 2 weeks. You may call [facility] at [phone] or [phone] after office hours and weekends  with any questions or concerns following your procedure. Take medications as prescribed. You have successfully completed aspirin challenge and will continue Aspirin 81mg daily Aspirin challenge was completed. You are to continue taking Aspirin 81 mg daily for the rest of your life. BP will remain within normal limits Continue with your blood pressure medications; eat a heart healthy diet with low salt diet; exercise regularly (consult with your physician or cardiologist first); maintain a heart healthy weight; include healthy ways to manage stress. Continue to follow with your primary care physician or cardiologist.

## 2018-01-16 NOTE — DISCHARGE NOTE ADULT - CARE PLAN
Principal Discharge DX:	CAD (coronary artery disease)  Goal:	Will remain free of chest pain  Assessment and plan of treatment:	Do Not Stop taking Aspirin or Plavix without speaking with your cardiologist  No heavy lifting, strenuous activity, bending, straining or unnecessary stair climbing  for 2 weeks. You may shower 24 hours following procedure but avoid baths and swimming for 1 week. Check groin site for bleeding and/or swelling daily following procedure. Call your doctor/cardiologist immediately should it occur or if you have increased/persistent pain at the site. Follow up with your cardiologist in 1- 2 weeks. You may call [facility] at [phone] or [phone] after office hours and weekends  with any questions or concerns following your procedure. Take medications as prescribed.  Secondary Diagnosis:	Aspirin allergy  Goal:	You have successfully completed aspirin challenge and will continue Aspirin 81mg daily  Assessment and plan of treatment:	Aspirin challenge was completed. You are to continue taking Aspirin 81 mg daily for the rest of your life.  Secondary Diagnosis:	Hypertension  Goal:	BP will remain within normal limits  Assessment and plan of treatment:	Continue with your blood pressure medications; eat a heart healthy diet with low salt diet; exercise regularly (consult with your physician or cardiologist first); maintain a heart healthy weight; include healthy ways to manage stress. Continue to follow with your primary care physician or cardiologist.

## 2018-01-16 NOTE — CHART NOTE - NSCHARTNOTEFT_GEN_A_CORE
====================  CCU MIDNIGHT ROUNDS  ====================    RICHARD CHARMAINE  [id number]    ====================  SUMMARY:   87 year old Faroese speaking male with PMH - HTN, PPM ([place] 2/21/17) CAD (diag cath 1/11/18 LM 50%; pLAD 90%, mLAD 80%,Cx 85%; OM1 30%; mRCA 100%) HLD, BPH, SESAR (does not use CPAP) who presents today for aspirin desensitization due to aspirin allergy (anaphylaxis nearly 30 years ago no aspirin products since that time) for high risk PCI after desensitized. (15 Blair 2018 09:52)  ====================  ====================  NEW EVENTS: No acute events.   ====================  ====================  VITALS (Last 12 hrs):  ====================    T(C): 36.3 (01-16-18 @ 18:00), Max: 36.6 (01-16-18 @ 14:00)  HR: 61 (01-16-18 @ 22:00) (60 - 70)  BP: 142/60 (01-16-18 @ 22:00) (135/61 - 171/69)  BP(mean): 85 (01-16-18 @ 22:00) (82 - 102)  RR: 16 (01-16-18 @ 22:00) (1 - 21)  SpO2: 98% (01-16-18 @ 22:00) (89% - 99%)    TELEMETRY: NSR 75    I&O's Summary    15 Blair 2018 07:01  -  16 Jan 2018 07:00  --------------------------------------------------------  IN: 440 mL / OUT: 1850 mL / NET: -1410 mL    16 Jan 2018 07:01  -  16 Jan 2018 23:10  --------------------------------------------------------  IN: 485 mL / OUT: 850 mL / NET: -365 mL    ====================  PLAN:  ====================  BPH (benign prostatic hyperplasia): Continue finesterise, alfuzosin  CAD (coronary artery disease) w/ TVDs/p IABP assisted catheterization with HAYDER x 3 to LAD and HAYDER x 1 LM: Continue ASA, plavix, lipitor, toprol  Dispo: DC Home in AM      Aria Cobos CCU NP   #[id number]

## 2018-01-16 NOTE — CHART NOTE - NSCHARTNOTEFT_GEN_A_CORE
====================  CCU NP note  ====================    RICHARD MONTANO  [id number]    ====================  SUMMARY:   86 y/o [place]ien male PMH - HTN, PPM, CAD (cath 1/11/18 triple vessel disease), HLD, BPH, SESAR for aspirin challenge due to aspirin allergy in preparation for high risk PCI    ====================      ====================  NEW EVENTS:  ASA three step challenge started at 7:30 am and completed without incident.    Pt remained stable throughout challenge. Denied SOB, dyspnea, pruritis, dizziness, lightheadedness, NV, CP.  No wheeze, tachycardia, rash noted.   ====================      ====================  VITALS (Last 12 hrs):  ====================    ICU Vital Signs Last 24 Hrs  T(C): 36.1 (16 Jan 2018 07:35), Max: 36.8 (15 Blair 2018 16:00)  T(F): 97 (16 Jan 2018 07:35), Max: 98.3 (15 Blair 2018 16:00)  HR: 61 (16 Jan 2018 10:00) (60 - 66)  BP: 147/69 (16 Jan 2018 10:00) (115/62 - 167/76)  BP(mean): 91 (16 Jan 2018 10:00) (70 - 104)  RR: 17 (16 Jan 2018 10:00) (14 - 99)  SpO2: 96% (16 Jan 2018 10:00) (91% - 98%)        ====================  PLAN:  Continue Aspirin 81 mg daily starting 1/17.    For cardiac cath today  ====================    HEALTH ISSUES - PROBLEM Dx:  BPH (benign prostatic hyperplasia): BPH (benign prostatic hyperplasia)  Pacemaker: Pacemaker  Hypertension: Hypertension  CAD (coronary artery disease): CAD (coronary artery disease)  Aspirin allergy: Aspirin allergy        HEALTH ISSUES - R/O PROBLEM Dx:

## 2018-01-16 NOTE — DISCHARGE NOTE ADULT - MEDICATION SUMMARY - MEDICATIONS TO TAKE
I will START or STAY ON the medications listed below when I get home from the hospital:    finasteride 5 mg oral tablet  -- 1 tab(s) by mouth once a day  -- Indication: For BPH (benign prostatic hyperplasia)    aspirin 81 mg oral delayed release tablet  -- 1 tab(s) by mouth once a day  -- Indication: For CAD (coronary artery disease)    lisinopril 2.5 mg oral tablet  -- 1 tab(s) by mouth once a day  -- Indication: For CAD (coronary artery disease)    alfuzosin 10 mg oral tablet, extended release  -- 1 tab(s) by mouth once a day  -- Indication: For BPH (benign prostatic hyperplasia)    pravastatin 10 mg oral tablet  -- 1 tab(s) by mouth once a day  -- Indication: For CAD (coronary artery disease)    Plavix 75 mg oral tablet  -- 1 tab(s) by mouth once a day  -- Indication: For CAD (coronary artery disease)    metoprolol succinate 50 mg oral tablet, extended release  -- 1 tab(s) by mouth once a day  -- Indication: For CAD (coronary artery disease)

## 2018-01-16 NOTE — DISCHARGE NOTE ADULT - PAIN PRESENT
"Anesthesia Post Evaluation    Patient: Aries Styles    Procedure(s) Performed: Procedure(s) (LRB):  [id number] mandibular distractors (Bilateral)    Final Anesthesia Type: general  Patient location during evaluation: PICU  Patient participation: No - Unable to Participate, Coma/Other Inability to Communicate  Level of consciousness: sedated  Post-procedure vital signs: reviewed and stable  Pain management: adequate  Airway patency: patent  PONV status at discharge: No PONV  Anesthetic complications: no      Cardiovascular status: blood pressure returned to baseline  Respiratory status: ETT and ventilator  Hydration status: euvolemic  Follow-up not needed.        Visit Vitals  BP (!) 96/75 (BP Location: Right leg, Patient Position: Lying)   Pulse 105   Temp 36.8 °C (98.3 °F) (Axillary)   Resp (!) 35   Ht 1' 5.42" (0.442 m)   Wt 2.91 kg (6 lb 6.7 oz)   HC 33.3 cm (13.09")   SpO2 (!) 100%   BMI 14.86 kg/m²       Pain/Deann Score: Pain Assessment Performed: Yes (2017  4:00 AM)  Pain Rating Prior to Med Admin: 5 (2017  8:13 AM)  Pain Rating Post Med Admin: 0 (2017  9:22 PM)      "
No

## 2018-01-16 NOTE — DISCHARGE NOTE ADULT - PATIENT PORTAL LINK FT
“You can access the Follow[facility] Patient Portal, offered by [facility], by registering with the following website: [email or web address]”

## 2018-01-16 NOTE — DISCHARGE NOTE ADULT - CARE PROVIDER_API CALL
Nesha Burkett), Cardiovascular Disease; Interventional Cardiology  [address] [place] [address]  Phone: [phone]  Fax: [phone]

## 2018-01-17 ENCOUNTER — APPOINTMENT (OUTPATIENT)
Dept: ELECTROPHYSIOLOGY | Facility: CLINIC | Age: 83
End: 2018-01-17

## 2018-01-17 VITALS
HEART RATE: 60 BPM | OXYGEN SATURATION: 97 % | RESPIRATION RATE: 16 BRPM | DIASTOLIC BLOOD PRESSURE: 55 MMHG | SYSTOLIC BLOOD PRESSURE: 109 MMHG

## 2018-01-17 LAB
ALBUMIN SERPL ELPH-MCNC: 3.1 G/DL — LOW (ref 3.3–5)
ALP SERPL-CCNC: 70 U/L — SIGNIFICANT CHANGE UP (ref 40–120)
ALT FLD-CCNC: 11 U/L RC — SIGNIFICANT CHANGE UP (ref 10–45)
ANION GAP SERPL CALC-SCNC: 13 MMOL/L — SIGNIFICANT CHANGE UP (ref 5–17)
APTT BLD: 30.3 SEC — SIGNIFICANT CHANGE UP (ref 27.5–37.4)
AST SERPL-CCNC: 26 U/L — SIGNIFICANT CHANGE UP (ref 10–40)
BILIRUB SERPL-MCNC: 0.5 MG/DL — SIGNIFICANT CHANGE UP (ref 0.2–1.2)
BUN SERPL-MCNC: 16 MG/DL — SIGNIFICANT CHANGE UP (ref 7–23)
CALCIUM SERPL-MCNC: 8.9 MG/DL — SIGNIFICANT CHANGE UP (ref 8.4–10.5)
CHLORIDE SERPL-SCNC: 101 MMOL/L — SIGNIFICANT CHANGE UP (ref 96–108)
CO2 SERPL-SCNC: 24 MMOL/L — SIGNIFICANT CHANGE UP (ref 22–31)
CREAT SERPL-MCNC: 0.95 MG/DL — SIGNIFICANT CHANGE UP (ref 0.5–1.3)
GLUCOSE SERPL-MCNC: 90 MG/DL — SIGNIFICANT CHANGE UP (ref 70–99)
HCT VFR BLD CALC: 39.8 % — SIGNIFICANT CHANGE UP (ref 39–50)
HGB BLD-MCNC: 13.4 G/DL — SIGNIFICANT CHANGE UP (ref 13–17)
INR BLD: 1.21 RATIO — HIGH (ref 0.88–1.16)
MAGNESIUM SERPL-MCNC: 2.1 MG/DL — SIGNIFICANT CHANGE UP (ref 1.6–2.6)
MCHC RBC-ENTMCNC: 31.1 PG — SIGNIFICANT CHANGE UP (ref 27–34)
MCHC RBC-ENTMCNC: 33.6 GM/DL — SIGNIFICANT CHANGE UP (ref 32–36)
MCV RBC AUTO: 92.4 FL — SIGNIFICANT CHANGE UP (ref 80–100)
PHOSPHATE SERPL-MCNC: 3.1 MG/DL — SIGNIFICANT CHANGE UP (ref 2.5–4.5)
PLATELET # BLD AUTO: 155 K/UL — SIGNIFICANT CHANGE UP (ref 150–400)
POTASSIUM SERPL-MCNC: 4.1 MMOL/L — SIGNIFICANT CHANGE UP (ref 3.5–5.3)
POTASSIUM SERPL-SCNC: 4.1 MMOL/L — SIGNIFICANT CHANGE UP (ref 3.5–5.3)
PROT SERPL-MCNC: 6.8 G/DL — SIGNIFICANT CHANGE UP (ref 6–8.3)
PROTHROM AB SERPL-ACNC: 13.2 SEC — HIGH (ref 9.8–12.7)
RBC # BLD: 4.31 M/UL — SIGNIFICANT CHANGE UP (ref 4.2–5.8)
RBC # FLD: 15.1 % — HIGH (ref 10.3–14.5)
SODIUM SERPL-SCNC: 138 MMOL/L — SIGNIFICANT CHANGE UP (ref 135–145)
WBC # BLD: 9.7 K/UL — SIGNIFICANT CHANGE UP (ref 3.8–10.5)
WBC # FLD AUTO: 9.7 K/UL — SIGNIFICANT CHANGE UP (ref 3.8–10.5)

## 2018-01-17 PROCEDURE — C1887: CPT

## 2018-01-17 PROCEDURE — 83036 HEMOGLOBIN GLYCOSYLATED A1C: CPT

## 2018-01-17 PROCEDURE — 93005 ELECTROCARDIOGRAM TRACING: CPT

## 2018-01-17 PROCEDURE — C1769: CPT

## 2018-01-17 PROCEDURE — 93010 ELECTROCARDIOGRAM REPORT: CPT

## 2018-01-17 PROCEDURE — 86850 RBC ANTIBODY SCREEN: CPT

## 2018-01-17 PROCEDURE — 93306 TTE W/DOPPLER COMPLETE: CPT

## 2018-01-17 PROCEDURE — 80061 LIPID PANEL: CPT

## 2018-01-17 PROCEDURE — C1725: CPT

## 2018-01-17 PROCEDURE — C1724: CPT

## 2018-01-17 PROCEDURE — 85610 PROTHROMBIN TIME: CPT

## 2018-01-17 PROCEDURE — C9600: CPT | Mod: LC

## 2018-01-17 PROCEDURE — 86900 BLOOD TYPING SEROLOGIC ABO: CPT

## 2018-01-17 PROCEDURE — C9602: CPT | Mod: LM

## 2018-01-17 PROCEDURE — 84100 ASSAY OF PHOSPHORUS: CPT

## 2018-01-17 PROCEDURE — 85730 THROMBOPLASTIN TIME PARTIAL: CPT

## 2018-01-17 PROCEDURE — 84480 ASSAY TRIIODOTHYRONINE (T3): CPT

## 2018-01-17 PROCEDURE — C1894: CPT

## 2018-01-17 PROCEDURE — 84436 ASSAY OF TOTAL THYROXINE: CPT

## 2018-01-17 PROCEDURE — C1874: CPT

## 2018-01-17 PROCEDURE — C1889: CPT

## 2018-01-17 PROCEDURE — 84443 ASSAY THYROID STIM HORMONE: CPT

## 2018-01-17 PROCEDURE — C1753: CPT

## 2018-01-17 PROCEDURE — 86901 BLOOD TYPING SEROLOGIC RH(D): CPT

## 2018-01-17 PROCEDURE — 85027 COMPLETE CBC AUTOMATED: CPT

## 2018-01-17 PROCEDURE — 80053 COMPREHEN METABOLIC PANEL: CPT

## 2018-01-17 PROCEDURE — 92978 ENDOLUMINL IVUS OCT C 1ST: CPT | Mod: LD

## 2018-01-17 PROCEDURE — 83735 ASSAY OF MAGNESIUM: CPT

## 2018-01-17 PROCEDURE — C1760: CPT

## 2018-01-17 RX ORDER — LISINOPRIL 2.5 MG/1
2.5 TABLET ORAL DAILY
Qty: 0 | Refills: 0 | Status: DISCONTINUED | OUTPATIENT
Start: 2018-01-17 | End: 2018-01-17

## 2018-01-17 RX ORDER — METOPROLOL TARTRATE 50 MG
1 TABLET ORAL
Qty: 0 | Refills: 0 | COMMUNITY

## 2018-01-17 RX ORDER — LISINOPRIL 2.5 MG/1
1 TABLET ORAL
Qty: 30 | Refills: 0 | OUTPATIENT
Start: 2018-01-17

## 2018-01-17 RX ORDER — METOPROLOL TARTRATE 50 MG
1 TABLET ORAL
Qty: 0 | Refills: 0 | COMMUNITY
Start: 2018-01-17

## 2018-01-17 RX ORDER — ASPIRIN/CALCIUM CARB/MAGNESIUM 324 MG
1 TABLET ORAL
Qty: 60 | Refills: 0 | OUTPATIENT
Start: 2018-01-17 | End: 2018-03-17

## 2018-01-17 RX ORDER — ACETAMINOPHEN 500 MG
1300 TABLET ORAL
Qty: 0 | Refills: 0 | COMMUNITY

## 2018-01-17 RX ADMIN — Medication 81 MILLIGRAM(S): at 11:02

## 2018-01-17 RX ADMIN — Medication 650 MILLIGRAM(S): at 01:18

## 2018-01-17 RX ADMIN — Medication 650 MILLIGRAM(S): at 11:02

## 2018-01-17 RX ADMIN — CLOPIDOGREL BISULFATE 75 MILLIGRAM(S): 75 TABLET, FILM COATED ORAL at 11:02

## 2018-01-17 RX ADMIN — LISINOPRIL 2.5 MILLIGRAM(S): 2.5 TABLET ORAL at 11:02

## 2018-01-17 RX ADMIN — Medication 650 MILLIGRAM(S): at 01:42

## 2018-01-17 RX ADMIN — Medication 50 MILLIGRAM(S): at 05:21

## 2018-01-17 NOTE — PROGRESS NOTE ADULT - PROBLEM SELECTOR PLAN 1
H/o anaphylaxis to aspirin 30 yrs prior; for cardiac cath  Continue to hold BB for challenge  Aspirin - three grade challenge this morning  Procedure explained to pt & daughter consent obtained  Monitor pt - emergency meds at bedside
DAPT, Statin, BB and Statin and ace I

## 2018-01-17 NOTE — PROGRESS NOTE ADULT - SUBJECTIVE AND OBJECTIVE BOX
HPI:  This is an 87 year old Divehi speaking male with PMH - HTN, PPM ([facility] 17) CAD (diag cath 18 LM 50%; pLAD 90%, mLAD 80%,Cx 85%; OM1 30%; mRCA 100%) HLD, BPH, SESAR (does not use CPAP) who presents today for aspirin desensitization due to aspirin allergy (anaphylaxis nearly 30 years ago no aspirin products since that time) for high risk PCI after desensitized. (15 Blair 2018 09:52)        Overnight Events:    OBJECTIVE:  Review Of Systems:    Constitutional:   Respiratory:   Cardiovascular:         Allergy:  Allergies    aspirin (Nephrotoxicity; Hives)  nonsteroidal anti-inflammatory agents (Nephrotoxicity)  shellfish (Rash)    Intolerances        Medications:  MEDICATIONS  (STANDING):  aspirin enteric coated 81 milliGRAM(s) Oral daily  atorvastatin 10 milliGRAM(s) Oral at bedtime  clopidogrel Tablet 75 milliGRAM(s) Oral daily  doxazosin 4 milliGRAM(s) Oral at bedtime  finasteride 5 milliGRAM(s) Oral daily  influenza   Vaccine 0.5 milliLiter(s) IntraMuscular once  lisinopril 2.5 milliGRAM(s) Oral daily  metoprolol succinate ER 50 milliGRAM(s) Oral daily    MEDICATIONS  (PRN):  acetaminophen   Tablet. 650 milliGRAM(s) Oral every 6 hours PRN Mild Pain (1 - 3)  ALBUTerol    0.083%. 2.5 milliGRAM(s) Nebulizer once PRN Wheezing  diphenhydrAMINE   Injectable 50 milliGRAM(s) IV Push once PRN pruritis/rash  EPINEPHrine   1 mG/mL Injectable 0.3 milliGRAM(s) IntraMuscular once PRN systemic symptoms / anaphylaxis  glucagon  Injectable 1 milliGRAM(s) IV Push once PRN anaphylaxis      PMH/PSH/FH/SH: [ ] Unchanged    Vitals:  T(C): 36.7 (18 @ 08:00), Max: 36.7 (18 @ 00:00)  HR: 60 (18 @ 08:00) (60 - 75)  BP: 119/58 (18 @ 08:00) (112/57 - 171/69)  BP(mean): 77 (18 @ 08:00) (72 - 136)  RR: 16 (18 @ 08:00) (1 - 21)  SpO2: 94% (18 @ 08:00) (89% - 100%)  Wt(kg): --  Daily     Daily Weight in k.9 (2018 06:00)  I&O's Summary    2018 07:01  -  2018 07:00  --------------------------------------------------------  IN: 725 mL / OUT: 1400 mL / NET: -675 mL    2018 07:01  -  2018 08:46  --------------------------------------------------------  IN: 0 mL / OUT: 400 mL / NET: -400 mL        Labs:                        13.4   9.7   )-----------( 155      ( 2018 05:09 )             39.8         138  |  101  |  16  ----------------------------<  90  4.1   |  24  |  0.95    Ca    8.9      2018 05:09  Phos  3.1       Mg     2.1         TPro  6.8  /  Alb  3.1<L>  /  TBili  0.5  /  DBili  x   /  AST  26  /  ALT  11  /  AlkPhos  70      PT/INR - ( 2018 05:09 )   PT: 13.2 sec;   INR: 1.21 ratio         PTT - ( 2018 05:09 )  PTT:30.3 sec      Magnesium, Serum: 2.1 mg/dL ( @ 05:09)  Phosphorus Level, Serum: 3.1 mg/dL ( @ 05:09)              ECG:    Echo:    Stress Testing:     Cath:    Imaging:    Interpretation of Telemetry:      Physical Exam:    Appearance:   Cardiovascular:   Procedural Access Site:  Respiratory:  Gastrointestinal:   Extremities: HPI:  This is an 87 year old Senegalese speaking male with PMH - HTN, PPM ([place] [facility] 17) CAD (diag cath 18 LM 50%; pLAD 90%, mLAD 80%,Cx 85%; OM1 30%; mRCA 100%) HLD, BPH, SESAR (does not use CPAP) who presents today for aspirin desensitization due to aspirin allergy (anaphylaxis nearly 30 years ago no aspirin products since that time) for high risk PCI after desensitized. (15 Blair 2018 09:52)        Overnight Events:none    OBJECTIVE:  Review Of Systems:    Constitutional: Senegalese speaking  Respiratory: No dyspnea  Cardiovascular: no chest discomfort        Allergy:  Allergies    aspirin (Nephrotoxicity; Hives)  nonsteroidal anti-inflammatory agents (Nephrotoxicity)  shellfish (Rash)    Intolerances        Medications:  MEDICATIONS  (STANDING):  aspirin enteric coated 81 milliGRAM(s) Oral daily  atorvastatin 10 milliGRAM(s) Oral at bedtime  clopidogrel Tablet 75 milliGRAM(s) Oral daily  doxazosin 4 milliGRAM(s) Oral at bedtime  finasteride 5 milliGRAM(s) Oral daily  influenza   Vaccine 0.5 milliLiter(s) IntraMuscular once  lisinopril 2.5 milliGRAM(s) Oral daily  metoprolol succinate ER 50 milliGRAM(s) Oral daily    MEDICATIONS  (PRN):  acetaminophen   Tablet. 650 milliGRAM(s) Oral every 6 hours PRN Mild Pain (1 - 3)  ALBUTerol    0.083%. 2.5 milliGRAM(s) Nebulizer once PRN Wheezing  diphenhydrAMINE   Injectable 50 milliGRAM(s) IV Push once PRN pruritis/rash  EPINEPHrine   1 mG/mL Injectable 0.3 milliGRAM(s) IntraMuscular once PRN systemic symptoms / anaphylaxis  glucagon  Injectable 1 milliGRAM(s) IV Push once PRN anaphylaxis      PMH/PSH/FH/SH: [ ] Unchanged    Vitals:  T(C): 36.7 (18 @ 08:00), Max: 36.7 (18 @ 00:00)  HR: 60 (18 @ 08:00) (60 - 75)  BP: 119/58 (18 @ 08:00) (112/57 - 171/69)  BP(mean): 77 (18 @ 08:00) (72 - 136)  RR: 16 (18 @ 08:00) (1 - 21)  SpO2: 94% (18 @ 08:00) (89% - 100%)  Wt(kg): --  Daily     Daily Weight in k.9 (2018 06:00)  I&O's Summary    2018 07:01  -  2018 07:00  --------------------------------------------------------  IN: 725 mL / OUT: 1400 mL / NET: -675 mL    2018 07:01  -  2018 08:46  --------------------------------------------------------  IN: 0 mL / OUT: 400 mL / NET: -400 mL        Labs:                        13.4   9.7   )-----------( 155      ( 2018 05:09 )             39.8         138  |  101  |  16  ----------------------------<  90  4.1   |  24  |  0.95    Ca    8.9      2018 05:09  Phos  3.1       Mg     2.1         TPro  6.8  /  Alb  3.1<L>  /  TBili  0.5  /  DBili  x   /  AST  26  /  ALT  11  /  AlkPhos  70      PT/INR - ( 2018 05:09 )   PT: 13.2 sec;   INR: 1.21 ratio         PTT - ( 2018 05:09 )  PTT:30.3 sec      Magnesium, Serum: 2.1 mg/dL ( @ 05:09)  Phosphorus Level, Serum: 3.1 mg/dL ( @ 05:09)              ECG:    Echo:< from: Transthoracic Echocardiogram (18 @ 05:37) >   Mild-moderate mitral regurgitation.  2. Normal left ventricular internal dimensions and wall  thicknesses.  3. Endocardium not well visualized; hypokinesis of the  lateral wall, inferior and inferolateral wall.  4. Mild diastolic dysfunction (Stage I).    < end of copied text >      Stress Testing:     Cath: : LM 50% HAYDER X 1, LAD 90% HAYDER X2, CX 85% HAYDER X !.  Mid %   No intervention     Imaging:    Interpretation of Telemetry:  Sinus rhythm       Physical Exam:    Appearance: well developed   Cardiovascular: S1 S2 No G or M   Procedural Access Site: B/L groins no ecchymosis or hematoma   Respiratory: No dyspnea or orthopnea   Gastrointestinal: soft ND NT   Extremities: No edema/ (+) pulses

## 2018-01-17 NOTE — PROGRESS NOTE ADULT - ASSESSMENT
88 y/o [place] speaking male PMH - HTN, PPM, CAD (diag cath 1/11/18), HLD, BPH, SESAR (does not use CPAP) who presents today for aspirin desensitization due to aspirin allergy (anaphylaxis nearly 30 years ago no aspirin products since that time) for high risk PCI after aspirin challenge per Allergy
85 Y/O M [place] speaking H/O PPM, BPH, HTN presented with triple vessel s/p HAYDER X 4  1/ 16 ( HAYDER to LM, HAYDER X2 to LAD and HAYDER to CX) on   EF 40% mild to mod MR .  Successful aspirin desensitazation    Now  awaiting discharge

## 2018-01-17 NOTE — PROGRESS NOTE ADULT - PROBLEM SELECTOR PLAN 2
Monitor VS  c/w Plavix, Atorvastatin  plan for cardiac cath after aspirin challenge  High risk PCI with possible IABP assist; discussed with pt & family aware may require main CCU following PCI with IABP remaining
DUAL BPH meds

## 2018-01-17 NOTE — PROGRESS NOTE ADULT - ATTENDING COMMENTS
Patient is seen and examined with fellow, NP and the CCU house-staff. I agree with the history, physical and the assessment and plan.  unstable angina s/o ASA challenge and s/p multi vessel PCI  on DAPT  tolerating low dose lisinopril and BB
Patient is seen and examined with fellow, NP and the CCU house-staff. I agree with the history, physical and the assessment and plan.  plan for high risk PCI post ASA challenge with possible mechanical support   on PLavix  f/u with TTE

## 2018-01-18 PROBLEM — Z95.0 PRESENCE OF CARDIAC PACEMAKER: Chronic | Status: ACTIVE | Noted: 2018-01-15

## 2018-01-22 ENCOUNTER — INPATIENT (INPATIENT)
Facility: HOSPITAL | Age: 83
LOS: 0 days | Discharge: HOME | DRG: 313 | End: 2018-01-23
Attending: INTERNAL MEDICINE | Admitting: HOSPITALIST
Payer: COMMERCIAL

## 2018-01-22 VITALS
OXYGEN SATURATION: 97 % | RESPIRATION RATE: 18 BRPM | DIASTOLIC BLOOD PRESSURE: 75 MMHG | SYSTOLIC BLOOD PRESSURE: 149 MMHG | HEART RATE: 60 BPM | TEMPERATURE: 98 F

## 2018-01-22 DIAGNOSIS — E78.5 HYPERLIPIDEMIA, UNSPECIFIED: ICD-10-CM

## 2018-01-22 DIAGNOSIS — Z98.890 OTHER SPECIFIED POSTPROCEDURAL STATES: Chronic | ICD-10-CM

## 2018-01-22 DIAGNOSIS — I25.118 ATHEROSCLEROTIC HEART DISEASE OF NATIVE CORONARY ARTERY WITH OTHER FORMS OF ANGINA PECTORIS: ICD-10-CM

## 2018-01-22 DIAGNOSIS — N40.0 BENIGN PROSTATIC HYPERPLASIA WITHOUT LOWER URINARY TRACT SYMPTOMS: ICD-10-CM

## 2018-01-22 DIAGNOSIS — I10 ESSENTIAL (PRIMARY) HYPERTENSION: ICD-10-CM

## 2018-01-22 DIAGNOSIS — Z95.0 PRESENCE OF CARDIAC PACEMAKER: ICD-10-CM

## 2018-01-22 DIAGNOSIS — Z90.49 ACQUIRED ABSENCE OF OTHER SPECIFIED PARTS OF DIGESTIVE TRACT: Chronic | ICD-10-CM

## 2018-01-22 DIAGNOSIS — Z29.9 ENCOUNTER FOR PROPHYLACTIC MEASURES, UNSPECIFIED: ICD-10-CM

## 2018-01-22 DIAGNOSIS — Z96.643 PRESENCE OF ARTIFICIAL HIP JOINT, BILATERAL: Chronic | ICD-10-CM

## 2018-01-22 DIAGNOSIS — R07.9 CHEST PAIN, UNSPECIFIED: ICD-10-CM

## 2018-01-22 DIAGNOSIS — I25.10 ATHEROSCLEROTIC HEART DISEASE OF NATIVE CORONARY ARTERY WITHOUT ANGINA PECTORIS: Chronic | ICD-10-CM

## 2018-01-22 LAB
BASOPHILS # BLD AUTO: 0.1 K/UL — SIGNIFICANT CHANGE UP (ref 0–0.2)
BASOPHILS NFR BLD AUTO: 1.3 % — SIGNIFICANT CHANGE UP (ref 0–2)
CK MB BLD-MCNC: 7.5 % — HIGH (ref 0–3.5)
CK MB BLD-MCNC: 9.8 % — HIGH (ref 0–3.5)
CK MB CFR SERPL CALC: 11.1 NG/ML — HIGH (ref 0–6.7)
CK MB CFR SERPL CALC: 12.3 NG/ML — HIGH (ref 0–6.7)
CK SERPL-CCNC: 126 U/L — SIGNIFICANT CHANGE UP (ref 30–200)
CK SERPL-CCNC: 148 U/L — SIGNIFICANT CHANGE UP (ref 30–200)
EOSINOPHIL # BLD AUTO: 0.4 K/UL — SIGNIFICANT CHANGE UP (ref 0–0.5)
EOSINOPHIL NFR BLD AUTO: 4.6 % — SIGNIFICANT CHANGE UP (ref 0–6)
HCT VFR BLD CALC: 37.4 % — LOW (ref 39–50)
HGB BLD-MCNC: 12.5 G/DL — LOW (ref 13–17)
LYMPHOCYTES # BLD AUTO: 2.3 K/UL — SIGNIFICANT CHANGE UP (ref 1–3.3)
LYMPHOCYTES # BLD AUTO: 28.9 % — SIGNIFICANT CHANGE UP (ref 13–44)
MCHC RBC-ENTMCNC: 31.3 PG — SIGNIFICANT CHANGE UP (ref 27–34)
MCHC RBC-ENTMCNC: 33.4 GM/DL — SIGNIFICANT CHANGE UP (ref 32–36)
MCV RBC AUTO: 93.6 FL — SIGNIFICANT CHANGE UP (ref 80–100)
MONOCYTES # BLD AUTO: 0.9 K/UL — SIGNIFICANT CHANGE UP (ref 0–0.9)
MONOCYTES NFR BLD AUTO: 11.7 % — SIGNIFICANT CHANGE UP (ref 2–14)
NEUTROPHILS # BLD AUTO: 4.3 K/UL — SIGNIFICANT CHANGE UP (ref 1.8–7.4)
NEUTROPHILS NFR BLD AUTO: 53.6 % — SIGNIFICANT CHANGE UP (ref 43–77)
PLATELET # BLD AUTO: 261 K/UL — SIGNIFICANT CHANGE UP (ref 150–400)
RBC # BLD: 3.99 M/UL — LOW (ref 4.2–5.8)
RBC # FLD: 14.9 % — HIGH (ref 10.3–14.5)
TROPONIN T SERPL-MCNC: 0.16 NG/ML — HIGH (ref 0–0.06)
TROPONIN T SERPL-MCNC: 0.18 NG/ML — HIGH (ref 0–0.06)
WBC # BLD: 8.1 K/UL — SIGNIFICANT CHANGE UP (ref 3.8–10.5)
WBC # FLD AUTO: 8.1 K/UL — SIGNIFICANT CHANGE UP (ref 3.8–10.5)

## 2018-01-22 PROCEDURE — 99285 EMERGENCY DEPT VISIT HI MDM: CPT | Mod: 25

## 2018-01-22 PROCEDURE — 99223 1ST HOSP IP/OBS HIGH 75: CPT | Mod: GC

## 2018-01-22 PROCEDURE — 99223 1ST HOSP IP/OBS HIGH 75: CPT

## 2018-01-22 PROCEDURE — 71045 X-RAY EXAM CHEST 1 VIEW: CPT | Mod: 26

## 2018-01-22 PROCEDURE — 93010 ELECTROCARDIOGRAM REPORT: CPT

## 2018-01-22 RX ORDER — ASPIRIN/CALCIUM CARB/MAGNESIUM 324 MG
81 TABLET ORAL DAILY
Qty: 0 | Refills: 0 | Status: DISCONTINUED | OUTPATIENT
Start: 2018-01-22 | End: 2018-01-23

## 2018-01-22 RX ORDER — METOPROLOL TARTRATE 50 MG
50 TABLET ORAL DAILY
Qty: 0 | Refills: 0 | Status: DISCONTINUED | OUTPATIENT
Start: 2018-01-22 | End: 2018-01-23

## 2018-01-22 RX ORDER — LISINOPRIL 2.5 MG/1
2.5 TABLET ORAL DAILY
Qty: 0 | Refills: 0 | Status: DISCONTINUED | OUTPATIENT
Start: 2018-01-22 | End: 2018-01-23

## 2018-01-22 RX ORDER — HEPARIN SODIUM 5000 [USP'U]/ML
5000 INJECTION INTRAVENOUS; SUBCUTANEOUS EVERY 12 HOURS
Qty: 0 | Refills: 0 | Status: DISCONTINUED | OUTPATIENT
Start: 2018-01-22 | End: 2018-01-23

## 2018-01-22 RX ORDER — TAMSULOSIN HYDROCHLORIDE 0.4 MG/1
0.4 CAPSULE ORAL AT BEDTIME
Qty: 0 | Refills: 0 | Status: DISCONTINUED | OUTPATIENT
Start: 2018-01-22 | End: 2018-01-23

## 2018-01-22 RX ORDER — FINASTERIDE 5 MG/1
1 TABLET, FILM COATED ORAL
Qty: 0 | Refills: 0 | COMMUNITY

## 2018-01-22 RX ORDER — FINASTERIDE 5 MG/1
5 TABLET, FILM COATED ORAL DAILY
Qty: 0 | Refills: 0 | Status: DISCONTINUED | OUTPATIENT
Start: 2018-01-22 | End: 2018-01-23

## 2018-01-22 RX ORDER — INFLUENZA VIRUS VACCINE 15; 15; 15; 15 UG/.5ML; UG/.5ML; UG/.5ML; UG/.5ML
0.5 SUSPENSION INTRAMUSCULAR ONCE
Qty: 0 | Refills: 0 | Status: COMPLETED | OUTPATIENT
Start: 2018-01-22 | End: 2018-01-22

## 2018-01-22 RX ORDER — ATORVASTATIN CALCIUM 80 MG/1
10 TABLET, FILM COATED ORAL AT BEDTIME
Qty: 0 | Refills: 0 | Status: DISCONTINUED | OUTPATIENT
Start: 2018-01-22 | End: 2018-01-23

## 2018-01-22 RX ORDER — CLOPIDOGREL BISULFATE 75 MG/1
75 TABLET, FILM COATED ORAL DAILY
Qty: 0 | Refills: 0 | Status: DISCONTINUED | OUTPATIENT
Start: 2018-01-22 | End: 2018-01-23

## 2018-01-22 RX ORDER — ALFUZOSIN HYDROCHLORIDE 10 MG/1
1 TABLET, EXTENDED RELEASE ORAL
Qty: 0 | Refills: 0 | COMMUNITY

## 2018-01-22 RX ORDER — SODIUM CHLORIDE 9 MG/ML
3 INJECTION INTRAMUSCULAR; INTRAVENOUS; SUBCUTANEOUS ONCE
Qty: 0 | Refills: 0 | Status: COMPLETED | OUTPATIENT
Start: 2018-01-22 | End: 2018-01-22

## 2018-01-22 RX ADMIN — SODIUM CHLORIDE 3 MILLILITER(S): 9 INJECTION INTRAMUSCULAR; INTRAVENOUS; SUBCUTANEOUS at 13:24

## 2018-01-22 RX ADMIN — FINASTERIDE 5 MILLIGRAM(S): 5 TABLET, FILM COATED ORAL at 23:15

## 2018-01-22 RX ADMIN — LISINOPRIL 2.5 MILLIGRAM(S): 2.5 TABLET ORAL at 23:15

## 2018-01-22 RX ADMIN — TAMSULOSIN HYDROCHLORIDE 0.4 MILLIGRAM(S): 0.4 CAPSULE ORAL at 23:15

## 2018-01-22 RX ADMIN — ATORVASTATIN CALCIUM 10 MILLIGRAM(S): 80 TABLET, FILM COATED ORAL at 23:15

## 2018-01-22 NOTE — ED ADULT NURSE NOTE - OBJECTIVE STATEMENT
86 year old male presented to ED with c/o of chest pressure starting today at 930AM. As per daughter, pt was pale, diaphoretic, stating he had 'pressure in his throat'. Pt was in hospital 4 days ago and received 4 stents, then discharged. Pt denies SOB, nausea/vomiting, numbness/tingling, fever, cough, chills, dizziness, headache. Pt skin not pale in ED, not diaphoretic. Pt currently has no CP in ED. Pt a&ox3, lung sounds clear, heart rate regular, EKG completed handed to MD (has pacemaker), abdomen soft nontender nondistended to palp. Skin intact. IV in right AC 18G and patent. Pt currently resting in bed with daughter at bedside. Will continue to monitor and assess while offering support and reassurance.

## 2018-01-22 NOTE — H&P ADULT - PROBLEM SELECTOR PLAN 2
S/p multiple HAYDER most recently last week. Declined CABG given age. Detailed records in cardiology note and HPI. Pt has been compliant with meds.   -Cont. asa, plavix, statin

## 2018-01-22 NOTE — ED PROVIDER NOTE - MUSCULOSKELETAL, MLM
Spine appears normal, range of motion is not limited, no muscle or joint tenderness; mild clavicular asymmetry, but nontender

## 2018-01-22 NOTE — H&P ADULT - HISTORY OF PRESENT ILLNESS
87 yo M ([place] Islander-speaking) w/ hx HTN, PPM, CAD s/p HAYDER last week presenting after an episode of upper chest pain, dry mouth, pallor, and diaphoresis (now resolved) that occurred this morning; patient reports he spoke with his cardiologist (Kwadwo) who recommended he present to the ED for evaluation. Pt has hx of PPM ([place] [facility] 2/21/17), recent cardiac catherization for a positive NST, found to have triple vessel disease (diag cath 1/11/18 LM 50%; pLAD 90%, mLAD 80%,Cx 85%; OM1 30%; mRCA 100%); pt refused CABG  and underwent high risk intervention on 1/17 for HAYDER to Left main.  HAYDER to CX and 2 HAYDER to m LAD .. EF was 40% on echocardiogram; stay also required ASA desensitation due to previous hx of ASA allergy. As per daughter, pt reportedly did not have true allergy upon testing. He was discharged in stable condition and reports that he was doing well until episode this morning. Reports that he woke up this morning, ambulated to the bathroom and went back to lay in bed; reports that when he laid down he started having upper chest pain "near his clavicle", dry mouth, and became pale, according to his daughter. The patient also describes the pains as quick short stabbing sensation. He reports he took his morning meds (ASA, plavix, metoprolol) but did not take any antiaginal agents. Denies any fevers, chills, nausea, vomiting; reports pain felt similar to stable angina symptoms he's had in the past before he obtained a stress test. Patient is currently without any complaints and wishes to return home. Denies LE edema, palpitations, SOB.

## 2018-01-22 NOTE — CONSULT NOTE ADULT - ATTENDING COMMENTS
86 year old man, complex coronary interventions last week, returns today with chest pain not characteristic of angina. Now asymptomatic. Exam unremarkable, initial enzymes elevated, possibly trending down since intervention. Observe as trend enzymes.

## 2018-01-22 NOTE — ED ADULT NURSE NOTE - PMH
BPH (benign prostatic hyperplasia)    HLD (hyperlipidemia)    Hypertension    Pacemaker  [employer]

## 2018-01-22 NOTE — H&P ADULT - PROBLEM SELECTOR PLAN 1
Trop down trending x2. Will hold off on 3rd unless more chest pain overnight. Appreciate cardiology recommendations. Consider additional anti-ischemic meds unless additional concerns for ischemia?  -Telemetry overnight  -F/u further cardiology recommendations  -Repeat ekg and trop if chest pain.

## 2018-01-22 NOTE — H&P ADULT - NSHPSOCIALHISTORY_GEN_ALL_CORE
Denies any significant tobacco. Occasional ETOH. Lives at home with daughter. No ambulatory assist devices

## 2018-01-22 NOTE — H&P ADULT - PMH
BPH (benign prostatic hyperplasia)    Coronary artery disease of native artery of native heart with stable angina pectoris    HLD (hyperlipidemia)    Hypertension    Pacemaker  [employer]

## 2018-01-22 NOTE — CONSULT NOTE ADULT - SUBJECTIVE AND OBJECTIVE BOX
Patient seen and evaluated at bedside    Chief Complaint: chest pain    HPI:  85 yo M (Estonian-speaking) w/ hx HTN, PPM, CAD s/p HAYDER last week presenting after an episode of upper chest pain, dry mouth, pallor, and diaphoresis (now resolved) that occurred this morning; patient reports he spoke with his cardiologist who recommended he present to the ED for evaluation. Pt has hx of PPM ([place] [facility] 2/21/17), recent cardiac catherization for a positive NST, found to have triple vessel disease (diag cath 1/11/18 LM 50%; pLAD 90%, mLAD 80%,Cx 85%; OM1 30%; mRCA 100%); pt refused CABG  and underwent high risk intervention on 1/17 for HAYDER to Left main.  HAYDER to CX and 2 HAYDER to m LAD .. EF was 40% on echocardiogram; stay also required ASA desensitation due to previous hx of ASA allergy. He was discharged in stable condition and reports that he was doing well until episode this morning. Reports that he woke up this morning, ambulated to the bathroom and went back to lay in bed; reports that when he laid down he started having upper chest pain "near his clavicle", dry mouth, and became pale, according to his daughter. He reports he took his morning meds (ASA, plavix, metoprolol) but did not take any antiaginal agents. Denies any fevers, chills, nausea, vomiting; reports pain felt similar to stable angina symptoms he's had in the past before he obtained a stress test. Denies any cough but daughter reports that his breathing appeared labored when he was speaking. Now in the ED, denies any symptoms at this time.    PMH:   Coronary artery disease of native artery of native heart with stable angina pectoris  Pacemaker  HLD (hyperlipidemia)  Hypertension  BPH (benign prostatic hyperplasia)      PSH:   3-vessel coronary artery disease  History of right cataract surgery  H/O carpal tunnel repair  H/O bilateral hip replacements  History of appendectomy      Medications:   Reviewed    Allergies:  aspirin (Nephrotoxicity; Hives)  nonsteroidal anti-inflammatory agents (Nephrotoxicity)  shellfish (Rash)      FAMILY HISTORY:  Family history of AICD (automatic internal cardiac defibrillator) (Sibling)      Social History:  Smoking History: former   Alcohol Use: infrequent    Review of Systems:  REVIEW OF SYSTEMS:    CONSTITUTIONAL: No weakness, fevers or chills  EYES/ENT: No visual changes;  No dysphagia  RESPIRATORY: No cough, wheezing, hemoptysis; No shortness of breath  CARDIOVASCULAR: No chest pain or palpitations; No lower extremity edema  GASTROINTESTINAL: No abdominal or epigastric pain. No nausea, vomiting, or hematemesis; No diarrhea or constipation. No melena or hematochezia.  BACK: No back pain  GENITOURINARY: No dysuria, frequency or hematuria  NEUROLOGICAL: No numbness or weakness  SKIN: No itching, burning, rashes, or lesions   All other review of systems is negative unless indicated above.    Physical Exam:  T(F): 97.9 (01-22), Max: 97.9 (01-22)  HR: 60 (01-22) (60 - 60)  BP: 149/75 (01-22) (149/75 - 149/75)  RR: 18 (01-22)  SpO2: 97% (01-22)  GENERAL: No acute distress, well-developed  HEAD:  Atraumatic, Normocephalic  ENT: EOMI, PERRLA, No JVD, moist mucosa  CHEST/LUNG: Clear to auscultation bilaterally; No wheeze, equal breath sounds bilaterally   HEART: Regular rate and rhythm; No murmurs, rubs, or gallops  ABDOMEN: Soft, Nontender, Nondistended; Bowel sounds present  EXTREMITIES:  No clubbing, cyanosis, or edema  PSYCH: Nl behavior, nl affect  NEUROLOGY: AAOx3, non-focal, cranial nerves intact  SKIN: Normal color, No rashes or lesions    Cardiovascular Diagnostic Testing:    ECG: Personally reviewed  AV paced    Labs: Personally reviewed                        12.5   8.1   )-----------( 261      ( 22 Jan 2018 13:33 )             37.4             CARDIAC MARKERS ( 22 Jan 2018 13:33 )  x     / 0.18 ng/mL / 148 U/L / x     / 11.1 ng/mL      Serum Pro-Brain Natriuretic Peptide: 642 pg/mL (01-22 @ 13:33)      A/P:  85 yo M (Estonian-speaking) w/ hx HTN, PPM, CAD s/p HAYDER last week presenting after an episode of chest pain.    Chest pain. Concerning for anginal pain in setting of recent stent.  - Trend Arielle  - Cont home meds Patient seen and evaluated at bedside    Chief Complaint: chest pain    HPI:  85 yo M (Afghan-speaking) w/ hx HTN, PPM, CAD s/p HAYDER last week presenting after an episode of upper chest pain, dry mouth, pallor, and diaphoresis (now resolved) that occurred this morning; patient reports he spoke with his cardiologist who recommended he present to the ED for evaluation. Pt has hx of PPM ([place] [facility] 2/21/17), recent cardiac catherization for a positive NST, found to have triple vessel disease (diag cath 1/11/18 LM 50%; pLAD 90%, mLAD 80%,Cx 85%; OM1 30%; mRCA 100%); pt refused CABG  and underwent high risk intervention on 1/17 for HAYDER to Left main.  HAYDER to CX and 2 HAYDER to m LAD .. EF was 40% on echocardiogram; stay also required ASA desensitation due to previous hx of ASA allergy. He was discharged in stable condition and reports that he was doing well until episode this morning. Reports that he woke up this morning, ambulated to the bathroom and went back to lay in bed; reports that when he laid down he started having upper chest pain "near his clavicle", dry mouth, and became pale, according to his daughter. He reports he took his morning meds (ASA, plavix, metoprolol) but did not take any antiaginal agents. Denies any fevers, chills, nausea, vomiting; reports pain felt similar to stable angina symptoms he's had in the past before he obtained a stress test. Denies any cough but daughter reports that his breathing appeared labored when he was speaking. Now in the ED, denies any symptoms at this time.    PMH:   Coronary artery disease of native artery of native heart with stable angina pectoris  Pacemaker  HLD (hyperlipidemia)  Hypertension  BPH (benign prostatic hyperplasia)      PSH:   3-vessel coronary artery disease  History of right cataract surgery  H/O carpal tunnel repair  H/O bilateral hip replacements  History of appendectomy      Medications:   Reviewed    Allergies:  aspirin (Nephrotoxicity; Hives)  nonsteroidal anti-inflammatory agents (Nephrotoxicity)  shellfish (Rash)      FAMILY HISTORY:  Family history of AICD (automatic internal cardiac defibrillator) (Sibling)      Social History:  Smoking History: former   Alcohol Use: infrequent    Review of Systems:  REVIEW OF SYSTEMS:    CONSTITUTIONAL: No weakness, fevers or chills  EYES/ENT: No visual changes;  No dysphagia  RESPIRATORY: No cough, wheezing, hemoptysis; No shortness of breath  CARDIOVASCULAR: No chest pain or palpitations; No lower extremity edema  GASTROINTESTINAL: No abdominal or epigastric pain. No nausea, vomiting, or hematemesis; No diarrhea or constipation. No melena or hematochezia.  BACK: No back pain  GENITOURINARY: No dysuria, frequency or hematuria  NEUROLOGICAL: No numbness or weakness  SKIN: No itching, burning, rashes, or lesions   All other review of systems is negative unless indicated above.    Physical Exam:  T(F): 97.9 (01-22), Max: 97.9 (01-22)  HR: 60 (01-22) (60 - 60)  BP: 149/75 (01-22) (149/75 - 149/75)  RR: 18 (01-22)  SpO2: 97% (01-22)  GENERAL: No acute distress, well-developed  HEAD:  Atraumatic, Normocephalic  ENT: EOMI, PERRLA, No JVD, moist mucosa  CHEST/LUNG: Clear to auscultation bilaterally; No wheeze, equal breath sounds bilaterally   HEART: Regular rate and rhythm; No murmurs, rubs, or gallops  ABDOMEN: Soft, Nontender, Nondistended; Bowel sounds present  EXTREMITIES:  No clubbing, cyanosis, or edema  PSYCH: Nl behavior, nl affect  NEUROLOGY: AAOx3, non-focal, cranial nerves intact  SKIN: Normal color, No rashes or lesions    Cardiovascular Diagnostic Testing:    ECG: Personally reviewed  AV paced    Labs: Personally reviewed                        12.5   8.1   )-----------( 261      ( 22 Jan 2018 13:33 )             37.4             CARDIAC MARKERS ( 22 Jan 2018 13:33 )  x     / 0.18 ng/mL / 148 U/L / x     / 11.1 ng/mL      Serum Pro-Brain Natriuretic Peptide: 642 pg/mL (01-22 @ 13:33)      A/P:  85 yo M (Afghan-speaking) w/ hx HTN, PPM, CAD s/p HAYDER last week presenting after an episode of chest pain.    Chest pain. Concerning for anginal pain in setting of recent stent.  - Trend Arielle  - Cont home ASA, plavix, statin, BB, and ACEi

## 2018-01-22 NOTE — ED ADULT NURSE NOTE - CHPI ED SYMPTOMS NEG
no shortness of breath/no vomiting/no diaphoresis/no back pain/no fever/no cough/no nausea/no dizziness/no chills/no syncope

## 2018-01-22 NOTE — H&P ADULT - ASSESSMENT
85 yo M (Pakistani-speaking) w/ hx HTN, [place] [employer] PPM, CAD s/p HAYDER last week presents w/ chest pain

## 2018-01-22 NOTE — ED PROVIDER NOTE - OBJECTIVE STATEMENT
87 yo M (Cambodian-speaking) w/ hx HTN, PPM, CAD s/p HAYDER last week presenting after an episode of upper chest pain, dry mouth, pallor, and diaphoresis (now resolved) that occurred this morning; patient reports he spoke with his cardiologist who recommended he present to the ED for evaluation. Pt has hx of PPM ([place] [facility] 2/21/17), recent cardiac catherization for a positive NST, found to have triple vessel disease (diag cath 1/11/18 LM 50%; pLAD 90%, mLAD 80%,Cx 85%; OM1 30%; mRCA 100%); pt refused CABG  and underwent high risk intervention on 1/17 for HAYDER to Left main.  HAYDER to CX and 2 HAYDER to m LAD .. EF was 40% on echocardiogram; stay also required ASA desensitation due to previous hx of ASA allergy. He was discharged in stable condition and reports that he was doing well until episode this morning. Reports that he woke up this morning, ambulated to the bathroom and went back to lay in bed; reports that when he laid down he started having upper chest pain "near his clavicle", dry mouth, and became pale, according to his daughter. He reports he took his morning meds (ASA, plavix, metoprolol) but did not take any antiaginal agents. Denies any fevers, chills, nausea, vomiting; reports pain felt similar to stable angina symptoms he's had in the past before he obtained a stress test. Denies any cough but daughter reports that his breathing appeared labored when he was speaking. Now in the ED, denies any symptoms at this time.

## 2018-01-22 NOTE — H&P ADULT - PSH
3-vessel coronary artery disease    H/O bilateral hip replacements    H/O carpal tunnel repair    History of appendectomy    History of right cataract surgery  bilat cataract sx

## 2018-01-22 NOTE — ED PROVIDER NOTE - PLAN OF CARE
Patient presenting with chest pain, reports similar in quality to angina he used to have; recently had PCI w/ HAYDER; seen by cardiology, recommending repeat cardiac enzymes and observation.

## 2018-01-22 NOTE — ED PROVIDER NOTE - CARE PLAN
Principal Discharge DX:	Chest pain  Assessment and plan of treatment:	Patient presenting with chest pain, reports similar in quality to angina he used to have; recently had PCI w/ HAYDER; seen by cardiology, recommending repeat cardiac enzymes and observation.

## 2018-01-22 NOTE — ED PROVIDER NOTE - MEDICAL DECISION MAKING DETAILS
Patient presenting with chest pain, reports similar in quality to angina he used to have; recently had PCI w/ HAYDER; seen by cardiology, recommending repeat cardiac enzymes and observation. Patient presenting with chest pain, reports similar in quality to angina he used to have; recently had PCI w/ HAYDER; seen by cardiology, recommending repeat cardiac enzymes and observation.  Naomi: Patient with extensive cardiac history and recent stent placement, here with chest pain.  will get labs, r/o acs, cards consult, likely admit for further evaluation given cardiac history and recent stent placement.

## 2018-01-22 NOTE — ED PROVIDER NOTE - CONSTITUTIONAL, MLM
normal (ped)... In no apparent distress, appears well developed and well nourished; obese, wearing surgical mask; normal voice and volume; obese; pleasant affect

## 2018-01-22 NOTE — H&P ADULT - NSHPPHYSICALEXAM_GEN_ALL_CORE
Vital Signs Last 24 Hrs  T(C): 36.8 (01-22-18 @ 21:45), Max: 36.9 (01-22-18 @ 18:15)  T(F): 98.2 (01-22-18 @ 21:45), Max: 98.4 (01-22-18 @ 18:15)  HR: 67 (01-22-18 @ 21:45) (60 - 70)  BP: 121/66 (01-22-18 @ 21:45) (121/66 - 158/81)  BP(mean): --  RR: 18 (01-22-18 @ 21:45) (18 - 18)  SpO2: 96% (01-22-18 @ 21:45) (96% - 99%)

## 2018-01-23 ENCOUNTER — MEDICATION RENEWAL (OUTPATIENT)
Age: 83
End: 2018-01-23

## 2018-01-23 ENCOUNTER — APPOINTMENT (OUTPATIENT)
Dept: ELECTROPHYSIOLOGY | Facility: CLINIC | Age: 83
End: 2018-01-23
Payer: MEDICARE

## 2018-01-23 ENCOUNTER — TRANSCRIPTION ENCOUNTER (OUTPATIENT)
Age: 83
End: 2018-01-23

## 2018-01-23 VITALS
RESPIRATION RATE: 16 BRPM | DIASTOLIC BLOOD PRESSURE: 74 MMHG | HEART RATE: 64 BPM | TEMPERATURE: 98 F | SYSTOLIC BLOOD PRESSURE: 132 MMHG | OXYGEN SATURATION: 100 %

## 2018-01-23 DIAGNOSIS — I45.9 CONDUCTION DISORDER, UNSPECIFIED: ICD-10-CM

## 2018-01-23 LAB
ANION GAP SERPL CALC-SCNC: 12 MMOL/L — SIGNIFICANT CHANGE UP (ref 5–17)
BUN SERPL-MCNC: 19 MG/DL — SIGNIFICANT CHANGE UP (ref 7–23)
CALCIUM SERPL-MCNC: 9.2 MG/DL — SIGNIFICANT CHANGE UP (ref 8.4–10.5)
CHLORIDE SERPL-SCNC: 103 MMOL/L — SIGNIFICANT CHANGE UP (ref 96–108)
CK MB BLD-MCNC: 9 % — HIGH (ref 0–3.5)
CK MB CFR SERPL CALC: 9.6 NG/ML — HIGH (ref 0–6.7)
CK SERPL-CCNC: 107 U/L — SIGNIFICANT CHANGE UP (ref 30–200)
CO2 SERPL-SCNC: 23 MMOL/L — SIGNIFICANT CHANGE UP (ref 22–31)
CREAT SERPL-MCNC: 0.98 MG/DL — SIGNIFICANT CHANGE UP (ref 0.5–1.3)
GLUCOSE SERPL-MCNC: 70 MG/DL — SIGNIFICANT CHANGE UP (ref 70–99)
HCT VFR BLD CALC: 36.8 % — LOW (ref 39–50)
HGB BLD-MCNC: 12.5 G/DL — LOW (ref 13–17)
MCHC RBC-ENTMCNC: 31.4 PG — SIGNIFICANT CHANGE UP (ref 27–34)
MCHC RBC-ENTMCNC: 34 GM/DL — SIGNIFICANT CHANGE UP (ref 32–36)
MCV RBC AUTO: 92.5 FL — SIGNIFICANT CHANGE UP (ref 80–100)
PLATELET # BLD AUTO: 278 K/UL — SIGNIFICANT CHANGE UP (ref 150–400)
POTASSIUM SERPL-MCNC: 4.5 MMOL/L — SIGNIFICANT CHANGE UP (ref 3.5–5.3)
POTASSIUM SERPL-SCNC: 4.5 MMOL/L — SIGNIFICANT CHANGE UP (ref 3.5–5.3)
RBC # BLD: 3.98 M/UL — LOW (ref 4.2–5.8)
RBC # FLD: 14.8 % — HIGH (ref 10.3–14.5)
SODIUM SERPL-SCNC: 138 MMOL/L — SIGNIFICANT CHANGE UP (ref 135–145)
TROPONIN T SERPL-MCNC: 0.14 NG/ML — HIGH (ref 0–0.06)
WBC # BLD: 7.2 K/UL — SIGNIFICANT CHANGE UP (ref 3.8–10.5)
WBC # FLD AUTO: 7.2 K/UL — SIGNIFICANT CHANGE UP (ref 3.8–10.5)

## 2018-01-23 PROCEDURE — 85027 COMPLETE CBC AUTOMATED: CPT

## 2018-01-23 PROCEDURE — 99239 HOSP IP/OBS DSCHRG MGMT >30: CPT

## 2018-01-23 PROCEDURE — 71045 X-RAY EXAM CHEST 1 VIEW: CPT

## 2018-01-23 PROCEDURE — 93294 REM INTERROG EVL PM/LDLS PM: CPT

## 2018-01-23 PROCEDURE — 83880 ASSAY OF NATRIURETIC PEPTIDE: CPT

## 2018-01-23 PROCEDURE — 80048 BASIC METABOLIC PNL TOTAL CA: CPT

## 2018-01-23 PROCEDURE — 84484 ASSAY OF TROPONIN QUANT: CPT

## 2018-01-23 PROCEDURE — 99285 EMERGENCY DEPT VISIT HI MDM: CPT | Mod: 25

## 2018-01-23 PROCEDURE — 99233 SBSQ HOSP IP/OBS HIGH 50: CPT | Mod: GC

## 2018-01-23 PROCEDURE — 82553 CREATINE MB FRACTION: CPT

## 2018-01-23 PROCEDURE — 82550 ASSAY OF CK (CPK): CPT

## 2018-01-23 PROCEDURE — 93005 ELECTROCARDIOGRAM TRACING: CPT

## 2018-01-23 RX ORDER — ACETAMINOPHEN 500 MG
650 TABLET ORAL ONCE
Qty: 0 | Refills: 0 | Status: COMPLETED | OUTPATIENT
Start: 2018-01-23 | End: 2018-01-23

## 2018-01-23 RX ADMIN — CLOPIDOGREL BISULFATE 75 MILLIGRAM(S): 75 TABLET, FILM COATED ORAL at 05:56

## 2018-01-23 RX ADMIN — Medication 650 MILLIGRAM(S): at 03:49

## 2018-01-23 RX ADMIN — Medication 50 MILLIGRAM(S): at 05:57

## 2018-01-23 RX ADMIN — Medication 81 MILLIGRAM(S): at 05:56

## 2018-01-23 RX ADMIN — Medication 650 MILLIGRAM(S): at 04:11

## 2018-01-23 NOTE — PROCEDURE NOTE - ADDITIONAL PROCEDURE DETAILS
Indication: regular device check up   - presenting rhythm: A-paced, V-paced rhythm at rate 60 bpm   - measured data WNL; ventricular impedance measured at 1031 ohms, which has been stable since2/2017. Normal PM function.   - Pt is PM dependent   - stored data revealed multiple episodes of PMTs since last interrogation on 12/13/2017, reviewed available EGMs c/w PMTs.   - changes made: increased PVARP 280 ms to 310 ms     COLLINS Blackmon [facility]-BC # [id number]

## 2018-01-23 NOTE — PROGRESS NOTE ADULT - ASSESSMENT
85 yo M (Serbian-speaking) w/ hx HTN, [place] [employer] PPM, CAD s/p HAYDER last week presents w/ chest pain

## 2018-01-23 NOTE — PROGRESS NOTE ADULT - ATTENDING COMMENTS
86 year old man, complex coronary interventions last week, returns today with chest pain not characteristic of angina. Remained asymptomatic since arrival. Exam unremarkable, initial enzymes elevated, continue trending down and seem likely residual since complex intervention which included extensive rotoblator. No further cardiac testing required, no change in cardiac medications. Anticipate discharge to home tomorrow.
D/c time 45 minutes

## 2018-01-23 NOTE — DISCHARGE NOTE ADULT - PATIENT PORTAL LINK FT
“You can access the Follow[facility] Patient Portal, offered by [facility], by registering with the following website: [email or web address]”

## 2018-01-23 NOTE — DISCHARGE NOTE ADULT - CARE PLAN
Principal Discharge DX:	Unstable angina  Goal:	You will be chest pain free  Assessment and plan of treatment:	Continue your medications. Do not stop Aspirin or Plavix unless instructed by your cardiologist.  No heavy lifting or pushing/pulling or strenuous activity with procedure arm for 2 weeks. No driving for 2 days. No sex for 1 week.  You may shower 24 hours following procedure but no soaking of your wrist in water (such as in a pool, sink, or tub) for 1 week. Check wrist site for bleeding and/or swelling daily following procedure. Call your doctor/cardiologist immediately for bleeding or swelling or if you have increased/persistent pain or drainage at the wrist site or if you have numbness, tingling or blue or white coloring of your hand or fingers.  Follow up with your cardiologist in 1- 2 weeks. You may call [facility] at [phone] or [phone] after office hours and weekends with any questions or concerns following your procedure.  Secondary Diagnosis:	Essential hypertension  Goal:	Your blood pressure will be controlled.  Assessment and plan of treatment:	Continue with your blood pressure medications; eat a heart healthy diet with low salt diet; exercise regularly (consult with your physician or cardiologist first); maintain a heart healthy weight; if you smoke - quit (A resource to help you stop smoking is the [facility] – phone number [phone].); include healthy ways to manage stress. Continue to follow with your primary care physician or cardiologist.  Secondary Diagnosis:	Other hyperlipidemia  Goal:	Your LDL cholesterol will be less than 70mg/dL  Assessment and plan of treatment:	Continue with your cholesterol medications. Eat a heart healthy diet that is low in saturated fats and salt, and includes whole grains, fruits, vegetables and lean protein; exercise regularly (consult with your physician or cardiologist first); maintain a heart healthy weight; if you smoke - quit (A resource to help you stop smoking is the [facility] – phone number [phone].). Continue to follow with your primary physician or cardiologist.

## 2018-01-23 NOTE — DISCHARGE NOTE ADULT - MEDICATION SUMMARY - MEDICATIONS TO TAKE
I will START or STAY ON the medications listed below when I get home from the hospital:    Aspirin 81mg tablet  -- 1 tab(s) by mouth once a day (in the morning)  -- Indication: For Coronary artery diease    finasteride 5 mg oral tablet  -- 1 tab(s) by mouth once a day  -- Indication: For Benign prostate Hypertrophy    Tylenol 8 HR Arthritis Pain 650 mg oral tablet, extended release  -- 2 tab(s) by mouth 2 times a day, As Needed  -- Indication: For arthritic pain    lisinopril 2.5 mg oral tablet  -- 1 tab(s) by mouth once a day  -- Indication: For High blood pressure    alfuzosin 10 mg oral tablet, extended release  -- 1 tab(s) by mouth once a day  -- Indication: For Benign prostate Hypertrophy    pravastatin 10 mg oral tablet  -- 1 tab(s) by mouth once a day (at bedtime)  -- Indication: For High cholesterol/heart protection    Plavix 75 mg oral tablet  -- 1 tab(s) by mouth once a day  -- Indication: For Coronary artery disease    metoprolol succinate 50 mg oral tablet, extended release  -- 1 tab(s) by mouth once a day  -- Indication: For High blood pressure/ heart protection

## 2018-01-23 NOTE — DISCHARGE NOTE ADULT - HOSPITAL COURSE
86 year old male (Slovak-speaking) w/ hx HTN, PPM, CAD s/p HAYDER last week presenting after an episode of upper chest pain, dry mouth, pallor, and diaphoresis (now resolved) that occurred this morning; patient reports he spoke with his cardiologist (Kwadwo) who recommended he present to the ED for evaluation. Pt has hx of PPM ([place] Sci 2/21/17), recent cardiac catherization for a positive NST, found to have triple vessel disease (diag cath 1/11/18 LM 50%; pLAD 90%, mLAD 80%,Cx 85%; OM1 30%; mRCA 100%); pt refused CABG  and underwent high risk intervention on 1/17 for HAYDER to Left main.  HAYDER to CX and 2 HAYDER to m LAD .. EF was 40% on echocardiogram; stay also required ASA desensitation due to previous hx of ASA allergy. The was seen by cardiology consult team. His cardiac enzymes were downtrending and they recommended discharge with outpatient follow up with Dr. Burkett. The patient also had is [employer] PPM interrrogated which revealed pacemaker mediated tachycardia, and his PPM was adjusted by the EP team. The patient was approved for discharge by Dr. Guzman and the hospitalist Dr. Pastor. 86 year old male (Irish-speaking) presented after an episode of upper chest pain, dry mouth, pallor, and diaphoresis.     Had recent cardiac catherization for a positive NST, found to have triple vessel disease (diag cath 1/11/18 LM 50%; pLAD 90%, mLAD 80%,Cx 85%; OM1 30%; mRCA 100%); refused CABG  and underwent high risk intervention on 1/17 for HAYDER to Left main. HAYDER to CX and 2 HAYDER to m LAD. EF was 40% on echocardiogram; stay also required ASA de-sensitation due to previous hx of ASA allergy.     Admitted to tele. Troponin elevated at 0.18. Per Cardiology, elevation likely residual from recent complex intervention which included extensive Rotablator. Enzymes downtrended during admission. No recurrent CP.     PPM interrogated by EPS- revealed pacemaker mediated tachycardia, settings adjusted. Discharged with follow up as above.    Diagnoses: Chest pain; CAD; HTN; Hyperlipidemia 86 year old male (Georgian-speaking) presented after an episode of upper chest pain, dry mouth, pallor, and diaphoresis.     Had recent cardiac catherization for a positive NST, found to have triple vessel disease (diag cath 1/11/18 LM 50%; pLAD 90%, mLAD 80%,Cx 85%; OM1 30%; mRCA 100%); refused CABG  and underwent high risk intervention on 1/17 for HAYDER to Left main. HAYDER to CX and 2 HAYDER to m LAD. EF was 40% on echocardiogram; stay also required ASA de-sensitation due to previous hx of ASA allergy.     Admitted to tele. Troponin elevated at 0.18. Per Cardiology, elevation likely residual from recent complex intervention which included extensive Rotablator. Enzymes downtrended during admission. No recurrent CP.     PPM interrogated by EPS- revealed pacemaker mediated tachycardia, settings adjusted. Discharged with follow up as above    Diagnoses: Chest pain; CAD; HTN; Hyperlipidemia

## 2018-01-23 NOTE — DISCHARGE NOTE ADULT - CARE PROVIDER_API CALL
Nesha Burkett), Cardiovascular Disease; Interventional Cardiology  [address] [place] [address]  Phone: [phone]  Fax: [phone]

## 2018-01-23 NOTE — PROGRESS NOTE ADULT - PROBLEM SELECTOR PLAN 1
Resolved. Enzymes trending down- likely residual since recent complex intervention which included extensive rotoblator. Per cardiology- may d/c home.

## 2018-01-23 NOTE — DISCHARGE NOTE ADULT - PLAN OF CARE
You will be chest pain free Continue your medications. Do not stop Aspirin or Plavix unless instructed by your cardiologist.  No heavy lifting or pushing/pulling or strenuous activity with procedure arm for 2 weeks. No driving for 2 days. No sex for 1 week.  You may shower 24 hours following procedure but no soaking of your wrist in water (such as in a pool, sink, or tub) for 1 week. Check wrist site for bleeding and/or swelling daily following procedure. Call your doctor/cardiologist immediately for bleeding or swelling or if you have increased/persistent pain or drainage at the wrist site or if you have numbness, tingling or blue or white coloring of your hand or fingers.  Follow up with your cardiologist in 1- 2 weeks. You may call [facility] at [phone] or [phone] after office hours and weekends with any questions or concerns following your procedure. Your blood pressure will be controlled. Continue with your blood pressure medications; eat a heart healthy diet with low salt diet; exercise regularly (consult with your physician or cardiologist first); maintain a heart healthy weight; if you smoke - quit (A resource to help you stop smoking is the [facility] – phone number [phone].); include healthy ways to manage stress. Continue to follow with your primary care physician or cardiologist. Your LDL cholesterol will be less than 70mg/dL Continue with your cholesterol medications. Eat a heart healthy diet that is low in saturated fats and salt, and includes whole grains, fruits, vegetables and lean protein; exercise regularly (consult with your physician or cardiologist first); maintain a heart healthy weight; if you smoke - quit (A resource to help you stop smoking is the [facility] – phone number [phone].). Continue to follow with your primary physician or cardiologist.

## 2018-01-23 NOTE — PROGRESS NOTE ADULT - SUBJECTIVE AND OBJECTIVE BOX
Patient seen and examined at bedside.    Overnight Events: ANNA. CP free.    Review Of Systems:   CONSTITUTIONAL: No weakness, fevers or chills  EYES/ENT: No visual changes;  No dysphagia  RESPIRATORY: No cough, wheezing, hemoptysis; No shortness of breath  CARDIOVASCULAR: No chest pain or palpitations; No lower extremity edema  GASTROINTESTINAL: No abdominal or epigastric pain. No nausea, vomiting, or hematemesis; No diarrhea or constipation. No melena or hematochezia.  BACK: No back pain  GENITOURINARY: No dysuria, frequency or hematuria  NEUROLOGICAL: No numbness or weakness  SKIN: No itching, burning, rashes, or lesions             Medications:  aspirin enteric coated 81 milliGRAM(s) Oral daily  atorvastatin 10 milliGRAM(s) Oral at bedtime  clopidogrel Tablet 75 milliGRAM(s) Oral daily  finasteride 5 milliGRAM(s) Oral daily  heparin  Injectable 5000 Unit(s) SubCutaneous every 12 hours  influenza   Vaccine 0.5 milliLiter(s) IntraMuscular once  lisinopril 2.5 milliGRAM(s) Oral daily  metoprolol succinate ER 50 milliGRAM(s) Oral daily  tamsulosin 0.4 milliGRAM(s) Oral at bedtime      PAST MEDICAL & SURGICAL HISTORY:  Coronary artery disease of native artery of native heart with stable angina pectoris  Pacemaker: Training [employer]  HLD (hyperlipidemia)  Hypertension  BPH (benign prostatic hyperplasia)  3-vessel coronary artery disease  History of right cataract surgery: bilat cataract sx  H/O carpal tunnel repair  H/O bilateral hip replacements  History of appendectomy      Vitals:  T(F): 98.2 (01-23), Max: 98.4 (01-22)  HR: 64 (01-23) (60 - 70)  BP: 141/71 (01-23) (121/66 - 158/81)  RR: 17 (01-23)  SpO2: 97% (01-23)  I&O's Summary    22 Jan 2018 07:01  -  23 Jan 2018 06:40  --------------------------------------------------------  IN: 150 mL / OUT: 700 mL / NET: -550 mL        Physical Exam:  GENERAL: No acute distress, well-developed  HEAD:  Atraumatic, Normocephalic  ENT: EOMI, PERRLA, No JVD, moist mucosa  CHEST/LUNG: Clear to auscultation bilaterally; No wheeze, equal breath sounds bilaterally   HEART: Regular rate and rhythm; No murmurs, rubs, or gallops  ABDOMEN: Soft, Nontender, Nondistended; Bowel sounds present  EXTREMITIES:  No clubbing, cyanosis, or edema  PSYCH: Nl behavior, nl affect  NEUROLOGY: AAOx3, non-focal, cranial nerves intact  SKIN: Normal color, No rashes or lesions                          12.5   7.2   )-----------( 278      ( 23 Jan 2018 05:22 )             36.8     01-23    138  |  103  |  19  ----------------------------<  70  4.5   |  23  |  0.98    Ca    9.2      23 Jan 2018 05:22        CARDIAC MARKERS ( 22 Jan 2018 17:50 )  x     / 0.16 ng/mL / 126 U/L / x     / 12.3 ng/mL  CARDIAC MARKERS ( 22 Jan 2018 13:33 )  x     / 0.18 ng/mL / 148 U/L / x     / 11.1 ng/mL      Serum Pro-Brain Natriuretic Peptide: 642 pg/mL (01-22 @ 13:33)    Interpretation of Telemetry:  SR 60-70s      A/P:  85 yo M (Amharic-speaking) w/ hx HTN, PPM, CAD s/p HAYDER last week presenting after an episode of chest pain.    Chest pain. Different than anginal pain. No CP in the hospital. Troponin trending down.  - Trend Arielle  - Cont home ASA, plavix, statin, BB, and ACEi  - Home if CE downtrending and patient is CP free
Patient is a 86y old  Male who presents with a chief complaint of Chest Pain (22 Jan 2018 22:07)    HPI: No recurrent CP. Pt wants to go home. Family by bedside.    Vital Signs Last 24 Hrs  T(C): 36.4 (23 Jan 2018 12:05), Max: 36.9 (22 Jan 2018 18:15)  T(F): 97.6 (23 Jan 2018 12:05), Max: 98.4 (22 Jan 2018 18:15)  HR: 60 (23 Jan 2018 12:05) (60 - 70)  BP: 140/56 (23 Jan 2018 12:05) (121/66 - 158/81)  BP(mean): --  RR: 17 (23 Jan 2018 12:05) (17 - 18)  SpO2: 97% (23 Jan 2018 12:05) (96% - 99%)                          12.5   7.2   )-----------( 278      ( 23 Jan 2018 05:22 )             36.8     01-23    138  |  103  |  19  ----------------------------<  70  4.5   |  23  |  0.98    Ca    9.2      23 Jan 2018 05:22    MEDICATIONS  (STANDING):  aspirin enteric coated 81 milliGRAM(s) Oral daily  atorvastatin 10 milliGRAM(s) Oral at bedtime  clopidogrel Tablet 75 milliGRAM(s) Oral daily  finasteride 5 milliGRAM(s) Oral daily  heparin  Injectable 5000 Unit(s) SubCutaneous every 12 hours  lisinopril 2.5 milliGRAM(s) Oral daily  metoprolol succinate ER 50 milliGRAM(s) Oral daily  tamsulosin 0.4 milliGRAM(s) Oral at bedtime    MEDICATIONS  (PRN):

## 2018-01-24 ENCOUNTER — APPOINTMENT (OUTPATIENT)
Dept: ELECTROPHYSIOLOGY | Facility: CLINIC | Age: 83
End: 2018-01-24

## 2018-01-25 ENCOUNTER — TRANSCRIPTION ENCOUNTER (OUTPATIENT)
Age: 83
End: 2018-01-25

## 2018-01-31 PROBLEM — I25.118 ATHEROSCLEROTIC HEART DISEASE OF NATIVE CORONARY ARTERY WITH OTHER FORMS OF ANGINA PECTORIS: Chronic | Status: ACTIVE | Noted: 2018-01-22

## 2018-02-07 ENCOUNTER — APPOINTMENT (OUTPATIENT)
Dept: CARDIOLOGY | Facility: CLINIC | Age: 83
End: 2018-02-07
Payer: MEDICARE

## 2018-02-07 ENCOUNTER — NON-APPOINTMENT (OUTPATIENT)
Age: 83
End: 2018-02-07

## 2018-02-07 VITALS
DIASTOLIC BLOOD PRESSURE: 72 MMHG | WEIGHT: 180 LBS | SYSTOLIC BLOOD PRESSURE: 149 MMHG | HEART RATE: 60 BPM | BODY MASS INDEX: 27.28 KG/M2 | OXYGEN SATURATION: 96 % | HEIGHT: 68 IN

## 2018-02-07 VITALS — DIASTOLIC BLOOD PRESSURE: 70 MMHG | HEART RATE: 60 BPM | SYSTOLIC BLOOD PRESSURE: 117 MMHG

## 2018-02-07 PROCEDURE — 99214 OFFICE O/P EST MOD 30 MIN: CPT

## 2018-02-07 PROCEDURE — 93000 ELECTROCARDIOGRAM COMPLETE: CPT

## 2018-02-20 PROBLEM — I45.9: Status: RESOLVED | Noted: 2017-03-03 | Resolved: 2018-02-20

## 2018-03-20 ENCOUNTER — APPOINTMENT (OUTPATIENT)
Dept: PULMONOLOGY | Facility: CLINIC | Age: 83
End: 2018-03-20

## 2018-04-16 ENCOUNTER — APPOINTMENT (OUTPATIENT)
Dept: PULMONOLOGY | Facility: CLINIC | Age: 83
End: 2018-04-16

## 2018-04-19 ENCOUNTER — APPOINTMENT (OUTPATIENT)
Dept: ELECTROPHYSIOLOGY | Facility: CLINIC | Age: 83
End: 2018-04-19
Payer: MEDICARE

## 2018-04-19 DIAGNOSIS — F91.9 CONDUCT DISORDER, UNSPECIFIED: ICD-10-CM

## 2018-04-19 PROCEDURE — 93294 REM INTERROG EVL PM/LDLS PM: CPT

## 2018-04-20 PROBLEM — F91.9 CONDUCT DISORDER: Status: ACTIVE | Noted: 2017-03-03

## 2018-04-25 ENCOUNTER — APPOINTMENT (OUTPATIENT)
Dept: CV DIAGNOSITCS | Facility: HOSPITAL | Age: 83
End: 2018-04-25

## 2018-04-25 ENCOUNTER — OUTPATIENT (OUTPATIENT)
Dept: OUTPATIENT SERVICES | Facility: HOSPITAL | Age: 83
LOS: 1 days | End: 2018-04-25
Payer: MEDICARE

## 2018-04-25 ENCOUNTER — NON-APPOINTMENT (OUTPATIENT)
Age: 83
End: 2018-04-25

## 2018-04-25 ENCOUNTER — APPOINTMENT (OUTPATIENT)
Dept: CARDIOLOGY | Facility: CLINIC | Age: 83
End: 2018-04-25
Payer: MEDICARE

## 2018-04-25 VITALS — SYSTOLIC BLOOD PRESSURE: 133 MMHG | DIASTOLIC BLOOD PRESSURE: 79 MMHG

## 2018-04-25 VITALS — OXYGEN SATURATION: 95 % | DIASTOLIC BLOOD PRESSURE: 81 MMHG | HEART RATE: 60 BPM | SYSTOLIC BLOOD PRESSURE: 155 MMHG

## 2018-04-25 DIAGNOSIS — Z90.49 ACQUIRED ABSENCE OF OTHER SPECIFIED PARTS OF DIGESTIVE TRACT: Chronic | ICD-10-CM

## 2018-04-25 DIAGNOSIS — Z98.890 OTHER SPECIFIED POSTPROCEDURAL STATES: Chronic | ICD-10-CM

## 2018-04-25 DIAGNOSIS — Z00.00 ENCOUNTER FOR GENERAL ADULT MEDICAL EXAMINATION W/OUT ABNORMAL FINDINGS: ICD-10-CM

## 2018-04-25 DIAGNOSIS — Z96.643 PRESENCE OF ARTIFICIAL HIP JOINT, BILATERAL: Chronic | ICD-10-CM

## 2018-04-25 DIAGNOSIS — I10 ESSENTIAL (PRIMARY) HYPERTENSION: ICD-10-CM

## 2018-04-25 DIAGNOSIS — I25.10 ATHEROSCLEROTIC HEART DISEASE OF NATIVE CORONARY ARTERY WITHOUT ANGINA PECTORIS: Chronic | ICD-10-CM

## 2018-04-25 PROCEDURE — 93306 TTE W/DOPPLER COMPLETE: CPT

## 2018-04-25 PROCEDURE — 93000 ELECTROCARDIOGRAM COMPLETE: CPT

## 2018-04-25 PROCEDURE — 99214 OFFICE O/P EST MOD 30 MIN: CPT

## 2018-04-25 PROCEDURE — 93306 TTE W/DOPPLER COMPLETE: CPT | Mod: 26

## 2018-04-26 ENCOUNTER — APPOINTMENT (OUTPATIENT)
Dept: PULMONOLOGY | Facility: CLINIC | Age: 83
End: 2018-04-26
Payer: MEDICARE

## 2018-04-26 VITALS
BODY MASS INDEX: 28.04 KG/M2 | HEART RATE: 57 BPM | RESPIRATION RATE: 15 BRPM | TEMPERATURE: 98.4 F | DIASTOLIC BLOOD PRESSURE: 80 MMHG | WEIGHT: 185 LBS | SYSTOLIC BLOOD PRESSURE: 140 MMHG | HEIGHT: 68 IN

## 2018-04-26 PROCEDURE — 99203 OFFICE O/P NEW LOW 30 MIN: CPT

## 2018-07-10 ENCOUNTER — APPOINTMENT (OUTPATIENT)
Dept: SLEEP CENTER | Facility: CLINIC | Age: 83
End: 2018-07-10
Payer: MEDICARE

## 2018-07-10 ENCOUNTER — OUTPATIENT (OUTPATIENT)
Dept: OUTPATIENT SERVICES | Facility: HOSPITAL | Age: 83
LOS: 1 days | End: 2018-07-10
Payer: COMMERCIAL

## 2018-07-10 DIAGNOSIS — Z98.890 OTHER SPECIFIED POSTPROCEDURAL STATES: Chronic | ICD-10-CM

## 2018-07-10 DIAGNOSIS — Z96.643 PRESENCE OF ARTIFICIAL HIP JOINT, BILATERAL: Chronic | ICD-10-CM

## 2018-07-10 DIAGNOSIS — I25.10 ATHEROSCLEROTIC HEART DISEASE OF NATIVE CORONARY ARTERY WITHOUT ANGINA PECTORIS: Chronic | ICD-10-CM

## 2018-07-10 DIAGNOSIS — Z90.49 ACQUIRED ABSENCE OF OTHER SPECIFIED PARTS OF DIGESTIVE TRACT: Chronic | ICD-10-CM

## 2018-07-10 PROCEDURE — 95807 SLEEP STUDY ATTENDED: CPT

## 2018-07-10 PROCEDURE — 95807 SLEEP STUDY ATTENDED: CPT | Mod: 26

## 2018-07-12 ENCOUNTER — RESULT REVIEW (OUTPATIENT)
Age: 83
End: 2018-07-12

## 2018-07-18 DIAGNOSIS — G47.33 OBSTRUCTIVE SLEEP APNEA (ADULT) (PEDIATRIC): ICD-10-CM

## 2018-07-18 PROBLEM — E78.5 HYPERLIPIDEMIA, UNSPECIFIED: Chronic | Status: ACTIVE | Noted: 2018-01-11

## 2018-07-18 PROBLEM — I10 ESSENTIAL (PRIMARY) HYPERTENSION: Chronic | Status: ACTIVE | Noted: 2018-01-11

## 2018-07-24 ENCOUNTER — APPOINTMENT (OUTPATIENT)
Dept: PULMONOLOGY | Facility: CLINIC | Age: 83
End: 2018-07-24

## 2018-09-27 ENCOUNTER — NON-APPOINTMENT (OUTPATIENT)
Age: 83
End: 2018-09-27

## 2018-09-27 ENCOUNTER — APPOINTMENT (OUTPATIENT)
Dept: CARDIOLOGY | Facility: CLINIC | Age: 83
End: 2018-09-27
Payer: MEDICARE

## 2018-09-27 ENCOUNTER — APPOINTMENT (OUTPATIENT)
Dept: ELECTROPHYSIOLOGY | Facility: CLINIC | Age: 83
End: 2018-09-27
Payer: MEDICARE

## 2018-09-27 VITALS — SYSTOLIC BLOOD PRESSURE: 136 MMHG | DIASTOLIC BLOOD PRESSURE: 79 MMHG | HEART RATE: 60 BPM

## 2018-09-27 DIAGNOSIS — I44.2 ATRIOVENTRICULAR BLOCK, COMPLETE: ICD-10-CM

## 2018-09-27 DIAGNOSIS — Z95.5 PRESENCE OF CORONARY ANGIOPLASTY IMPLANT AND GRAFT: ICD-10-CM

## 2018-09-27 PROCEDURE — 93000 ELECTROCARDIOGRAM COMPLETE: CPT

## 2018-09-27 PROCEDURE — 99214 OFFICE O/P EST MOD 30 MIN: CPT

## 2018-09-27 PROCEDURE — 93280 PM DEVICE PROGR EVAL DUAL: CPT

## 2018-10-17 PROBLEM — Z95.5 PRESENCE OF DRUG-ELUTING STENT IN RIGHT CORONARY ARTERY: Status: ACTIVE | Noted: 2018-02-08

## 2018-10-18 ENCOUNTER — APPOINTMENT (OUTPATIENT)
Dept: ELECTROPHYSIOLOGY | Facility: CLINIC | Age: 83
End: 2018-10-18
Payer: MEDICARE

## 2018-10-18 PROCEDURE — ZZZZZ: CPT

## 2018-11-20 ENCOUNTER — RX RENEWAL (OUTPATIENT)
Age: 83
End: 2018-11-20

## 2018-12-11 ENCOUNTER — RX RENEWAL (OUTPATIENT)
Age: 83
End: 2018-12-11

## 2018-12-12 ENCOUNTER — NON-APPOINTMENT (OUTPATIENT)
Age: 83
End: 2018-12-12

## 2018-12-12 ENCOUNTER — APPOINTMENT (OUTPATIENT)
Dept: CARDIOLOGY | Facility: CLINIC | Age: 83
End: 2018-12-12
Payer: MEDICARE

## 2018-12-12 VITALS
HEART RATE: 67 BPM | HEIGHT: 68 IN | DIASTOLIC BLOOD PRESSURE: 74 MMHG | WEIGHT: 184 LBS | BODY MASS INDEX: 27.89 KG/M2 | OXYGEN SATURATION: 97 % | SYSTOLIC BLOOD PRESSURE: 150 MMHG

## 2018-12-12 PROCEDURE — 99214 OFFICE O/P EST MOD 30 MIN: CPT

## 2018-12-12 PROCEDURE — 93000 ELECTROCARDIOGRAM COMPLETE: CPT

## 2018-12-12 NOTE — PHYSICAL EXAM
[General Appearance - Well Developed] : well developed [Normal Appearance] : normal appearance [Well Groomed] : well groomed [General Appearance - Well Nourished] : well nourished [No Deformities] : no deformities [General Appearance - In No Acute Distress] : no acute distress [Normal Conjunctiva] : the conjunctiva exhibited no abnormalities [Eyelids - No Xanthelasma] : the eyelids demonstrated no xanthelasmas [Normal Oral Mucosa] : normal oral mucosa [No Oral Pallor] : no oral pallor [No Oral Cyanosis] : no oral cyanosis [Normal Jugular Venous A Waves Present] : normal jugular venous A waves present [Normal Jugular Venous V Waves Present] : normal jugular venous V waves present [No Jugular Venous Correa A Waves] : no jugular venous correa A waves [Respiration, Rhythm And Depth] : normal respiratory rhythm and effort [Exaggerated Use Of Accessory Muscles For Inspiration] : no accessory muscle use [Auscultation Breath Sounds / Voice Sounds] : lungs were clear to auscultation bilaterally [Heart Rate And Rhythm] : heart rate and rhythm were normal [Heart Sounds] : normal S1 and S2 [Murmurs] : no murmurs present [Abdomen Soft] : soft [Abdomen Tenderness] : non-tender [Abdomen Mass (___ Cm)] : no abdominal mass palpated [Abnormal Walk] : normal gait [Gait - Sufficient For Exercise Testing] : the gait was sufficient for exercise testing [Nail Clubbing] : no clubbing of the fingernails [Cyanosis, Localized] : no localized cyanosis [Petechial Hemorrhages (___cm)] : no petechial hemorrhages [Skin Color & Pigmentation] : normal skin color and pigmentation [] : no rash [No Venous Stasis] : no venous stasis [Skin Lesions] : no skin lesions [No Skin Ulcers] : no skin ulcer [No Xanthoma] : no  xanthoma was observed [Oriented To Time, Place, And Person] : oriented to person, place, and time [Affect] : the affect was normal [Mood] : the mood was normal [No Anxiety] : not feeling anxious

## 2018-12-13 NOTE — HISTORY OF PRESENT ILLNESS
[FreeTextEntry1] : Gianfranco is here for f/u\par Feels well\par No CP\par No LE edema\par BP ok \par No ppm events\par Does not use CPAP - feels uncomfortable with it\par

## 2018-12-13 NOTE — DISCUSSION/SUMMARY
[FreeTextEntry1] : Discussed compliance to DAP\par Encouraged more walking\par \par No further changes \par Return in 2-3 months\par

## 2019-01-10 NOTE — PHYSICAL THERAPY INITIAL EVALUATION ADULT - LEVEL OF INDEPENDENCE: STAND/SIT, REHAB EVAL
[facility] Group Visit Note  1/9/2019      Subjective:      Patient ID: Tiffanie Rodriguez is a 55year old female. Chief Complaint:  Patient presents with:  Weight Check: Here for 1 month f/u on life style changes. Patient gained 1 lb.   Phentermine scr moist  Neck:  No cervical lymphadenopathy  CV: Regular rate and rhythm. No murmurs, gallops, or rubs.   Lungs:  Clear to auscultation B, no wheezes, rales, or rhonchi, normal respiratory effort  Abd:  +bowel sounds, soft  Ext:  No pedal edema,  Pedal pulses independent

## 2019-03-28 ENCOUNTER — APPOINTMENT (OUTPATIENT)
Dept: ELECTROPHYSIOLOGY | Facility: CLINIC | Age: 84
End: 2019-03-28

## 2019-03-28 ENCOUNTER — APPOINTMENT (OUTPATIENT)
Dept: ELECTROPHYSIOLOGY | Facility: CLINIC | Age: 84
End: 2019-03-28
Payer: MEDICARE

## 2019-03-28 ENCOUNTER — NON-APPOINTMENT (OUTPATIENT)
Age: 84
End: 2019-03-28

## 2019-03-28 ENCOUNTER — APPOINTMENT (OUTPATIENT)
Dept: CARDIOLOGY | Facility: CLINIC | Age: 84
End: 2019-03-28
Payer: MEDICARE

## 2019-03-28 VITALS
WEIGHT: 184 LBS | SYSTOLIC BLOOD PRESSURE: 134 MMHG | OXYGEN SATURATION: 94 % | HEIGHT: 68 IN | HEART RATE: 74 BPM | BODY MASS INDEX: 27.89 KG/M2 | DIASTOLIC BLOOD PRESSURE: 72 MMHG

## 2019-03-28 PROCEDURE — 93000 ELECTROCARDIOGRAM COMPLETE: CPT

## 2019-03-28 PROCEDURE — 99214 OFFICE O/P EST MOD 30 MIN: CPT

## 2019-03-28 PROCEDURE — 93280 PM DEVICE PROGR EVAL DUAL: CPT

## 2019-03-31 NOTE — DISCUSSION/SUMMARY
[FreeTextEntry1] : Increase beta blocker for arrhythmia seen on ppm\par Other meds to remain the same\par \par Return in 2-3 months\par

## 2019-03-31 NOTE — HISTORY OF PRESENT ILLNESS
[FreeTextEntry1] : Gianfranco is here for f/u\par Feels well\par No CP\par No LE edema\par BP ok \par Brief episodes of A tach and SVT on PPM. Brief NSVT\par \par Does not use CPAP - feels uncomfortable with it\par

## 2019-06-03 ENCOUNTER — RX RENEWAL (OUTPATIENT)
Age: 84
End: 2019-06-03

## 2019-06-04 ENCOUNTER — OTHER (OUTPATIENT)
Age: 84
End: 2019-06-04

## 2019-06-27 ENCOUNTER — APPOINTMENT (OUTPATIENT)
Dept: ELECTROPHYSIOLOGY | Facility: CLINIC | Age: 84
End: 2019-06-27
Payer: MEDICARE

## 2019-06-27 PROCEDURE — 93294 REM INTERROG EVL PM/LDLS PM: CPT

## 2019-06-27 PROCEDURE — 93296 REM INTERROG EVL PM/IDS: CPT

## 2019-07-10 ENCOUNTER — APPOINTMENT (OUTPATIENT)
Dept: ELECTROPHYSIOLOGY | Facility: CLINIC | Age: 84
End: 2019-07-10
Payer: MEDICARE

## 2019-07-10 ENCOUNTER — EMERGENCY (EMERGENCY)
Facility: HOSPITAL | Age: 84
LOS: 1 days | Discharge: HOME | End: 2019-07-10

## 2019-07-10 ENCOUNTER — APPOINTMENT (OUTPATIENT)
Dept: CARDIOLOGY | Facility: CLINIC | Age: 84
End: 2019-07-10
Payer: MEDICARE

## 2019-07-10 ENCOUNTER — NON-APPOINTMENT (OUTPATIENT)
Age: 84
End: 2019-07-10

## 2019-07-10 VITALS
OXYGEN SATURATION: 95 % | DIASTOLIC BLOOD PRESSURE: 75 MMHG | WEIGHT: 177.91 LBS | HEIGHT: 67 IN | HEART RATE: 64 BPM | SYSTOLIC BLOOD PRESSURE: 126 MMHG | TEMPERATURE: 98 F | RESPIRATION RATE: 20 BRPM

## 2019-07-10 VITALS
BODY MASS INDEX: 28.61 KG/M2 | OXYGEN SATURATION: 96 % | HEIGHT: 66 IN | DIASTOLIC BLOOD PRESSURE: 84 MMHG | WEIGHT: 178 LBS | SYSTOLIC BLOOD PRESSURE: 139 MMHG | HEART RATE: 62 BPM

## 2019-07-10 DIAGNOSIS — Z98.890 OTHER SPECIFIED POSTPROCEDURAL STATES: Chronic | ICD-10-CM

## 2019-07-10 DIAGNOSIS — Z90.49 ACQUIRED ABSENCE OF OTHER SPECIFIED PARTS OF DIGESTIVE TRACT: Chronic | ICD-10-CM

## 2019-07-10 DIAGNOSIS — Z96.643 PRESENCE OF ARTIFICIAL HIP JOINT, BILATERAL: Chronic | ICD-10-CM

## 2019-07-10 DIAGNOSIS — I25.10 ATHEROSCLEROTIC HEART DISEASE OF NATIVE CORONARY ARTERY WITHOUT ANGINA PECTORIS: Chronic | ICD-10-CM

## 2019-07-10 PROCEDURE — 93280 PM DEVICE PROGR EVAL DUAL: CPT

## 2019-07-10 PROCEDURE — 99214 OFFICE O/P EST MOD 30 MIN: CPT

## 2019-07-10 PROCEDURE — 93000 ELECTROCARDIOGRAM COMPLETE: CPT

## 2019-07-10 NOTE — PHYSICAL EXAM
[General Appearance - Well Developed] : well developed [Normal Appearance] : normal appearance [Well Groomed] : well groomed [No Deformities] : no deformities [General Appearance - In No Acute Distress] : no acute distress [General Appearance - Well Nourished] : well nourished [Normal Conjunctiva] : the conjunctiva exhibited no abnormalities [Normal Oral Mucosa] : normal oral mucosa [Eyelids - No Xanthelasma] : the eyelids demonstrated no xanthelasmas [No Oral Pallor] : no oral pallor [No Oral Cyanosis] : no oral cyanosis [Normal Jugular Venous A Waves Present] : normal jugular venous A waves present [Normal Jugular Venous V Waves Present] : normal jugular venous V waves present [No Jugular Venous Correa A Waves] : no jugular venous correa A waves [Exaggerated Use Of Accessory Muscles For Inspiration] : no accessory muscle use [Respiration, Rhythm And Depth] : normal respiratory rhythm and effort [Heart Rate And Rhythm] : heart rate and rhythm were normal [Auscultation Breath Sounds / Voice Sounds] : lungs were clear to auscultation bilaterally [Murmurs] : no murmurs present [Heart Sounds] : normal S1 and S2 [Abdomen Soft] : soft [Abdomen Tenderness] : non-tender [Abdomen Mass (___ Cm)] : no abdominal mass palpated [Abnormal Walk] : normal gait [Gait - Sufficient For Exercise Testing] : the gait was sufficient for exercise testing [Nail Clubbing] : no clubbing of the fingernails [Petechial Hemorrhages (___cm)] : no petechial hemorrhages [Cyanosis, Localized] : no localized cyanosis [Skin Color & Pigmentation] : normal skin color and pigmentation [] : no rash [No Venous Stasis] : no venous stasis [No Skin Ulcers] : no skin ulcer [Skin Lesions] : no skin lesions [No Xanthoma] : no  xanthoma was observed [Oriented To Time, Place, And Person] : oriented to person, place, and time [Mood] : the mood was normal [Affect] : the affect was normal [No Anxiety] : not feeling anxious

## 2019-07-11 NOTE — DISCUSSION/SUMMARY
[FreeTextEntry1] : No further SVT ot VT since March. (one episode of AF in March 0:59 sec worth - no recurrence since).\par No changes to meds\par Labs reviewed\par F/u 3 mo\par If further AF will need to start NOAC (reviewed with daughter) \par

## 2019-07-11 NOTE — HISTORY OF PRESENT ILLNESS
[FreeTextEntry1] : Gianfranco is here for f/u\par Feels well\par No CP\par No LE edema\par BP ok   \par Notes fatigue and short lived episodes of chest pain which resolve with rest

## 2019-09-23 ENCOUNTER — OTHER (OUTPATIENT)
Age: 84
End: 2019-09-23

## 2019-09-23 RX ORDER — CLOPIDOGREL BISULFATE 75 MG/1
75 TABLET, FILM COATED ORAL
Qty: 90 | Refills: 3 | Status: DISCONTINUED | COMMUNITY
Start: 2018-01-12 | End: 2019-09-23

## 2019-09-26 ENCOUNTER — APPOINTMENT (OUTPATIENT)
Dept: CARDIOLOGY | Facility: CLINIC | Age: 84
End: 2019-09-26

## 2019-09-26 ENCOUNTER — APPOINTMENT (OUTPATIENT)
Dept: ELECTROPHYSIOLOGY | Facility: CLINIC | Age: 84
End: 2019-09-26

## 2019-10-03 ENCOUNTER — MEDICATION RENEWAL (OUTPATIENT)
Age: 84
End: 2019-10-03

## 2019-10-30 ENCOUNTER — APPOINTMENT (OUTPATIENT)
Dept: ELECTROPHYSIOLOGY | Facility: CLINIC | Age: 84
End: 2019-10-30
Payer: MEDICARE

## 2019-10-30 ENCOUNTER — NON-APPOINTMENT (OUTPATIENT)
Age: 84
End: 2019-10-30

## 2019-10-30 ENCOUNTER — APPOINTMENT (OUTPATIENT)
Dept: CARDIOLOGY | Facility: CLINIC | Age: 84
End: 2019-10-30
Payer: MEDICARE

## 2019-10-30 VITALS
DIASTOLIC BLOOD PRESSURE: 68 MMHG | OXYGEN SATURATION: 95 % | HEART RATE: 63 BPM | SYSTOLIC BLOOD PRESSURE: 121 MMHG | HEIGHT: 68 IN

## 2019-10-30 PROCEDURE — 99214 OFFICE O/P EST MOD 30 MIN: CPT

## 2019-10-30 PROCEDURE — 93000 ELECTROCARDIOGRAM COMPLETE: CPT

## 2019-10-30 PROCEDURE — 93280 PM DEVICE PROGR EVAL DUAL: CPT

## 2019-10-31 NOTE — HISTORY OF PRESENT ILLNESS
[FreeTextEntry1] : Gianfranco is here for f/u\par Feels well\par No CP\par No LE edema\par BP ok   \par One episode of hematuria on Eliquis - self resolved\par \par

## 2019-10-31 NOTE — DISCUSSION/SUMMARY
[FreeTextEntry1] : Epidose of AF in Sept for hours - started on Eliquis\par No episodes since then\par No changes to meds\par  \par F/u 3-4 mo

## 2019-11-22 ENCOUNTER — APPOINTMENT (OUTPATIENT)
Dept: UROLOGY | Facility: CLINIC | Age: 84
End: 2019-11-22
Payer: MEDICARE

## 2019-11-22 VITALS
HEIGHT: 68 IN | TEMPERATURE: 98.1 F | HEART RATE: 73 BPM | SYSTOLIC BLOOD PRESSURE: 120 MMHG | DIASTOLIC BLOOD PRESSURE: 72 MMHG | WEIGHT: 181 LBS | RESPIRATION RATE: 17 BRPM | BODY MASS INDEX: 27.43 KG/M2

## 2019-11-22 DIAGNOSIS — N40.1 BENIGN PROSTATIC HYPERPLASIA WITH LOWER URINARY TRACT SYMPMS: ICD-10-CM

## 2019-11-22 DIAGNOSIS — Z80.8 FAMILY HISTORY OF MALIGNANT NEOPLASM OF OTHER ORGANS OR SYSTEMS: ICD-10-CM

## 2019-11-22 DIAGNOSIS — Z78.9 OTHER SPECIFIED HEALTH STATUS: ICD-10-CM

## 2019-11-22 DIAGNOSIS — R35.1 BENIGN PROSTATIC HYPERPLASIA WITH LOWER URINARY TRACT SYMPMS: ICD-10-CM

## 2019-11-22 DIAGNOSIS — Z80.0 FAMILY HISTORY OF MALIGNANT NEOPLASM OF DIGESTIVE ORGANS: ICD-10-CM

## 2019-11-22 DIAGNOSIS — Z82.0 FAMILY HISTORY OF EPILEPSY AND OTHER DISEASES OF THE NERVOUS SYSTEM: ICD-10-CM

## 2019-11-22 DIAGNOSIS — Z72.89 OTHER PROBLEMS RELATED TO LIFESTYLE: ICD-10-CM

## 2019-11-22 PROCEDURE — 99203 OFFICE O/P NEW LOW 30 MIN: CPT

## 2019-11-22 NOTE — PHYSICAL EXAM
[General Appearance - Well Developed] : well developed [General Appearance - Well Nourished] : well nourished [Normal Appearance] : normal appearance [Well Groomed] : well groomed [General Appearance - In No Acute Distress] : no acute distress [Edema] : no peripheral edema [Respiration, Rhythm And Depth] : normal respiratory rhythm and effort [Exaggerated Use Of Accessory Muscles For Inspiration] : no accessory muscle use [Abdomen Soft] : soft [Abdomen Tenderness] : non-tender [Costovertebral Angle Tenderness] : no ~M costovertebral angle tenderness [Urethral Meatus] : meatus normal [Urinary Bladder Findings] : the bladder was normal on palpation [Scrotum] : the scrotum was normal [Testes Mass (___cm)] : there were no testicular masses [Prostate Enlargement] : the prostate was not enlarged [Prostate Tenderness] : the prostate was not tender [No Prostate Nodules] : no prostate nodules [Normal Station and Gait] : the gait and station were normal for the patient's age [] : no rash [No Focal Deficits] : no focal deficits [Oriented To Time, Place, And Person] : oriented to person, place, and time [Affect] : the affect was normal [Mood] : the mood was normal [Not Anxious] : not anxious

## 2019-11-22 NOTE — REVIEW OF SYSTEMS
[Dry Eyes] : dryness of the eyes [Constipation] : constipation [see HPI] : see HPI [Wake up at night to urinate  How many times?  ___] : wakes up to urinate [unfilled] times during the night [Strong urge to urinate] : strong urge to urinate [Strain or push to urinate] : strain or push to urinate [Joint Pain] : joint pain [Negative] : Heme/Lymph

## 2019-11-22 NOTE — ASSESSMENT
[FreeTextEntry1] : Patient is a 89 yo M who presents for BPH/LUTS.\par \par Overall doing well\par Cont dual BPH therapy\par F/u PRN

## 2020-01-09 NOTE — ED PROVIDER NOTE - PSYCHIATRIC, MLM
----- Message from Gladys Martinez sent at 1/9/2020  9:29 AM CST -----  Contact: self/[phone]  Would like to consult with nurse regarding medication, please call back at [phone]. Thanks/ar   Alert and oriented to person, place, time/situation. normal mood and affect. no apparent risk to self or others.

## 2020-01-30 ENCOUNTER — APPOINTMENT (OUTPATIENT)
Dept: ELECTROPHYSIOLOGY | Facility: CLINIC | Age: 85
End: 2020-01-30
Payer: MEDICARE

## 2020-01-30 PROCEDURE — 93294 REM INTERROG EVL PM/LDLS PM: CPT

## 2020-01-30 PROCEDURE — 93296 REM INTERROG EVL PM/IDS: CPT

## 2020-03-04 ENCOUNTER — APPOINTMENT (OUTPATIENT)
Dept: ELECTROPHYSIOLOGY | Facility: CLINIC | Age: 85
End: 2020-03-04
Payer: MEDICARE

## 2020-03-04 ENCOUNTER — NON-APPOINTMENT (OUTPATIENT)
Age: 85
End: 2020-03-04

## 2020-03-04 ENCOUNTER — APPOINTMENT (OUTPATIENT)
Dept: CARDIOLOGY | Facility: CLINIC | Age: 85
End: 2020-03-04
Payer: MEDICARE

## 2020-03-04 VITALS
OXYGEN SATURATION: 95 % | HEART RATE: 63 BPM | DIASTOLIC BLOOD PRESSURE: 71 MMHG | HEIGHT: 68 IN | SYSTOLIC BLOOD PRESSURE: 105 MMHG

## 2020-03-04 PROCEDURE — 99213 OFFICE O/P EST LOW 20 MIN: CPT

## 2020-03-04 PROCEDURE — 93000 ELECTROCARDIOGRAM COMPLETE: CPT

## 2020-03-04 PROCEDURE — 93280 PM DEVICE PROGR EVAL DUAL: CPT

## 2020-03-07 NOTE — HISTORY OF PRESENT ILLNESS
[FreeTextEntry1] : Gianfranco is here for f/u\par Feels well\par No CP\par No LE edema\par BP ok   \par Device reveals no Afib\par

## 2020-03-07 NOTE — DISCUSSION/SUMMARY
[FreeTextEntry1] : Epidose of AF in Sept for hours - started on Eliquis\par No episodes since then\par No angina\par BP acceptable\par To have labs checked soon\par No changes to meds\par  \par F/u 3-4 mo

## 2020-03-27 ENCOUNTER — RX RENEWAL (OUTPATIENT)
Age: 85
End: 2020-03-27

## 2020-04-20 ENCOUNTER — RX CHANGE (OUTPATIENT)
Age: 85
End: 2020-04-20

## 2020-06-04 ENCOUNTER — APPOINTMENT (OUTPATIENT)
Dept: ELECTROPHYSIOLOGY | Facility: CLINIC | Age: 85
End: 2020-06-04
Payer: MEDICARE

## 2020-06-04 PROCEDURE — 93294 REM INTERROG EVL PM/LDLS PM: CPT

## 2020-06-04 PROCEDURE — 93296 REM INTERROG EVL PM/IDS: CPT

## 2020-06-05 ENCOUNTER — RX RENEWAL (OUTPATIENT)
Age: 85
End: 2020-06-05

## 2020-06-11 ENCOUNTER — RX RENEWAL (OUTPATIENT)
Age: 85
End: 2020-06-11

## 2020-06-17 ENCOUNTER — APPOINTMENT (OUTPATIENT)
Dept: CARDIOLOGY | Facility: CLINIC | Age: 85
End: 2020-06-17
Payer: MEDICARE

## 2020-06-17 ENCOUNTER — NON-APPOINTMENT (OUTPATIENT)
Age: 85
End: 2020-06-17

## 2020-06-17 ENCOUNTER — APPOINTMENT (OUTPATIENT)
Dept: ELECTROPHYSIOLOGY | Facility: CLINIC | Age: 85
End: 2020-06-17
Payer: MEDICARE

## 2020-06-17 VITALS
HEART RATE: 60 BPM | WEIGHT: 177 LBS | OXYGEN SATURATION: 97 % | HEIGHT: 68 IN | BODY MASS INDEX: 26.83 KG/M2 | SYSTOLIC BLOOD PRESSURE: 143 MMHG | DIASTOLIC BLOOD PRESSURE: 84 MMHG

## 2020-06-17 PROCEDURE — 93280 PM DEVICE PROGR EVAL DUAL: CPT

## 2020-06-17 PROCEDURE — 99213 OFFICE O/P EST LOW 20 MIN: CPT

## 2020-06-17 PROCEDURE — 93000 ELECTROCARDIOGRAM COMPLETE: CPT

## 2020-06-18 NOTE — HISTORY OF PRESENT ILLNESS
[FreeTextEntry1] : Gianfranco is here for f/u\par \par No CP\par No LE edema\par Daughter notes mild increase in dyspnea\par BP ok   \par Device reveals no Afib\par

## 2020-06-18 NOTE — DISCUSSION/SUMMARY
[FreeTextEntry1] : Epidose of AF in Sept for hours - started on Eliquis - no bleeding\par \par No episodes since then\par No angina - but c/o dyspnea\par BP acceptable\par Check echo to eval dyspnea complaints\par No changes to meds\par  \par F/u 3-4 mo

## 2020-06-18 NOTE — PHYSICAL EXAM
[Normal Appearance] : normal appearance [General Appearance - Well Developed] : well developed [Well Groomed] : well groomed [General Appearance - Well Nourished] : well nourished [No Deformities] : no deformities [Normal Conjunctiva] : the conjunctiva exhibited no abnormalities [General Appearance - In No Acute Distress] : no acute distress [Eyelids - No Xanthelasma] : the eyelids demonstrated no xanthelasmas [No Oral Pallor] : no oral pallor [Normal Oral Mucosa] : normal oral mucosa [No Oral Cyanosis] : no oral cyanosis [Normal Jugular Venous A Waves Present] : normal jugular venous A waves present [No Jugular Venous Correa A Waves] : no jugular venous correa A waves [Normal Jugular Venous V Waves Present] : normal jugular venous V waves present [Respiration, Rhythm And Depth] : normal respiratory rhythm and effort [Exaggerated Use Of Accessory Muscles For Inspiration] : no accessory muscle use [Heart Rate And Rhythm] : heart rate and rhythm were normal [Auscultation Breath Sounds / Voice Sounds] : lungs were clear to auscultation bilaterally [Heart Sounds] : normal S1 and S2 [Abdomen Soft] : soft [Murmurs] : no murmurs present [Abdomen Tenderness] : non-tender [Abdomen Mass (___ Cm)] : no abdominal mass palpated [Abnormal Walk] : normal gait [Nail Clubbing] : no clubbing of the fingernails [Gait - Sufficient For Exercise Testing] : the gait was sufficient for exercise testing [Cyanosis, Localized] : no localized cyanosis [Skin Color & Pigmentation] : normal skin color and pigmentation [Petechial Hemorrhages (___cm)] : no petechial hemorrhages [No Venous Stasis] : no venous stasis [Skin Lesions] : no skin lesions [] : no rash [Oriented To Time, Place, And Person] : oriented to person, place, and time [No Skin Ulcers] : no skin ulcer [No Xanthoma] : no  xanthoma was observed [Affect] : the affect was normal [Mood] : the mood was normal [No Anxiety] : not feeling anxious [Edema] : no peripheral edema present

## 2020-07-14 ENCOUNTER — APPOINTMENT (OUTPATIENT)
Dept: CV DIAGNOSITCS | Facility: HOSPITAL | Age: 85
End: 2020-07-14

## 2020-07-28 ENCOUNTER — APPOINTMENT (OUTPATIENT)
Dept: CARDIOLOGY | Facility: CLINIC | Age: 85
End: 2020-07-28
Payer: MEDICARE

## 2020-07-28 ENCOUNTER — TRANSCRIPTION ENCOUNTER (OUTPATIENT)
Age: 85
End: 2020-07-28

## 2020-07-28 PROCEDURE — 93306 TTE W/DOPPLER COMPLETE: CPT

## 2020-08-07 DIAGNOSIS — Z01.818 ENCOUNTER FOR OTHER PREPROCEDURAL EXAMINATION: ICD-10-CM

## 2020-08-08 ENCOUNTER — APPOINTMENT (OUTPATIENT)
Dept: DISASTER EMERGENCY | Facility: CLINIC | Age: 85
End: 2020-08-08

## 2020-08-09 LAB — SARS-COV-2 N GENE NPH QL NAA+PROBE: NOT DETECTED

## 2020-08-11 ENCOUNTER — OUTPATIENT (OUTPATIENT)
Dept: OUTPATIENT SERVICES | Facility: HOSPITAL | Age: 85
LOS: 1 days | End: 2020-08-11
Payer: COMMERCIAL

## 2020-08-11 VITALS
SYSTOLIC BLOOD PRESSURE: 127 MMHG | RESPIRATION RATE: 16 BRPM | HEIGHT: 67 IN | WEIGHT: 175.05 LBS | DIASTOLIC BLOOD PRESSURE: 59 MMHG | TEMPERATURE: 97 F | OXYGEN SATURATION: 99 %

## 2020-08-11 VITALS
OXYGEN SATURATION: 96 % | HEART RATE: 66 BPM | SYSTOLIC BLOOD PRESSURE: 167 MMHG | RESPIRATION RATE: 18 BRPM | DIASTOLIC BLOOD PRESSURE: 88 MMHG

## 2020-08-11 DIAGNOSIS — Z98.890 OTHER SPECIFIED POSTPROCEDURAL STATES: Chronic | ICD-10-CM

## 2020-08-11 DIAGNOSIS — Z79.890 HORMONE REPLACEMENT THERAPY: ICD-10-CM

## 2020-08-11 DIAGNOSIS — I13.0 HYPERTENSIVE HEART AND CHRONIC KIDNEY DISEASE WITH HEART FAILURE AND STAGE 1 THROUGH STAGE 4 CHRONIC KIDNEY DISEASE, OR UNSPECIFIED CHRONIC KIDNEY DISEASE: ICD-10-CM

## 2020-08-11 DIAGNOSIS — Z96.643 PRESENCE OF ARTIFICIAL HIP JOINT, BILATERAL: Chronic | ICD-10-CM

## 2020-08-11 DIAGNOSIS — I25.10 ATHEROSCLEROTIC HEART DISEASE OF NATIVE CORONARY ARTERY WITHOUT ANGINA PECTORIS: Chronic | ICD-10-CM

## 2020-08-11 DIAGNOSIS — Z95.5 PRESENCE OF CORONARY ANGIOPLASTY IMPLANT AND GRAFT: Chronic | ICD-10-CM

## 2020-08-11 DIAGNOSIS — Z90.49 ACQUIRED ABSENCE OF OTHER SPECIFIED PARTS OF DIGESTIVE TRACT: Chronic | ICD-10-CM

## 2020-08-11 LAB
ANION GAP SERPL CALC-SCNC: 13 MMOL/L — SIGNIFICANT CHANGE UP (ref 5–17)
BUN SERPL-MCNC: 23 MG/DL — SIGNIFICANT CHANGE UP (ref 7–23)
CALCIUM SERPL-MCNC: 9.5 MG/DL — SIGNIFICANT CHANGE UP (ref 8.4–10.5)
CHLORIDE SERPL-SCNC: 102 MMOL/L — SIGNIFICANT CHANGE UP (ref 96–108)
CO2 SERPL-SCNC: 27 MMOL/L — SIGNIFICANT CHANGE UP (ref 22–31)
CREAT SERPL-MCNC: 1.12 MG/DL — SIGNIFICANT CHANGE UP (ref 0.5–1.3)
GLUCOSE SERPL-MCNC: 97 MG/DL — SIGNIFICANT CHANGE UP (ref 70–99)
HCT VFR BLD CALC: 37.2 % — LOW (ref 39–50)
HGB BLD-MCNC: 11.6 G/DL — LOW (ref 13–17)
MCHC RBC-ENTMCNC: 29.5 PG — SIGNIFICANT CHANGE UP (ref 27–34)
MCHC RBC-ENTMCNC: 31.2 GM/DL — LOW (ref 32–36)
MCV RBC AUTO: 94.7 FL — SIGNIFICANT CHANGE UP (ref 80–100)
NRBC # BLD: 0 /100 WBCS — SIGNIFICANT CHANGE UP (ref 0–0)
PLATELET # BLD AUTO: 185 K/UL — SIGNIFICANT CHANGE UP (ref 150–400)
POTASSIUM SERPL-MCNC: 4.1 MMOL/L — SIGNIFICANT CHANGE UP (ref 3.5–5.3)
POTASSIUM SERPL-SCNC: 4.1 MMOL/L — SIGNIFICANT CHANGE UP (ref 3.5–5.3)
RBC # BLD: 3.93 M/UL — LOW (ref 4.2–5.8)
RBC # FLD: 15.1 % — HIGH (ref 10.3–14.5)
SODIUM SERPL-SCNC: 142 MMOL/L — SIGNIFICANT CHANGE UP (ref 135–145)
WBC # BLD: 9.44 K/UL — SIGNIFICANT CHANGE UP (ref 3.8–10.5)
WBC # FLD AUTO: 9.44 K/UL — SIGNIFICANT CHANGE UP (ref 3.8–10.5)

## 2020-08-11 PROCEDURE — 93458 L HRT ARTERY/VENTRICLE ANGIO: CPT | Mod: XU

## 2020-08-11 PROCEDURE — C1725: CPT

## 2020-08-11 PROCEDURE — C1894: CPT

## 2020-08-11 PROCEDURE — C1769: CPT

## 2020-08-11 PROCEDURE — 93458 L HRT ARTERY/VENTRICLE ANGIO: CPT | Mod: 26,XU

## 2020-08-11 PROCEDURE — C1760: CPT

## 2020-08-11 PROCEDURE — 92920 PRQ TRLUML C ANGIOP 1ART&/BR: CPT | Mod: LC

## 2020-08-11 PROCEDURE — 93005 ELECTROCARDIOGRAM TRACING: CPT

## 2020-08-11 PROCEDURE — C1887: CPT

## 2020-08-11 PROCEDURE — 85027 COMPLETE CBC AUTOMATED: CPT

## 2020-08-11 PROCEDURE — 93010 ELECTROCARDIOGRAM REPORT: CPT | Mod: 76

## 2020-08-11 PROCEDURE — 80048 BASIC METABOLIC PNL TOTAL CA: CPT

## 2020-08-11 RX ORDER — FUROSEMIDE 40 MG
1 TABLET ORAL
Qty: 0 | Refills: 0 | DISCHARGE

## 2020-08-11 RX ORDER — CLOPIDOGREL BISULFATE 75 MG/1
1 TABLET, FILM COATED ORAL
Qty: 0 | Refills: 0 | DISCHARGE

## 2020-08-11 RX ORDER — ASPIRIN/CALCIUM CARB/MAGNESIUM 324 MG
1 TABLET ORAL
Qty: 0 | Refills: 0 | DISCHARGE

## 2020-08-11 RX ORDER — ACETAMINOPHEN 500 MG
2 TABLET ORAL
Qty: 0 | Refills: 0 | DISCHARGE

## 2020-08-11 RX ORDER — CLOPIDOGREL BISULFATE 75 MG/1
1 TABLET, FILM COATED ORAL
Qty: 90 | Refills: 4
Start: 2020-08-11 | End: 2021-11-03

## 2020-08-11 NOTE — ASU DISCHARGE PLAN (ADULT/PEDIATRIC) - CARE PROVIDER_API CALL
Nesha Burkett (MD)  Cardiovascular Disease; Interventional Cardiology  [address] [place] [address]  Phone: [phone]  Fax: [phone]  Follow Up Time: 2 weeks

## 2020-08-11 NOTE — H&P CARDIOLOGY - REVIEW OF SYSTEMS
REVIEW OF SYSTEMS:    CONSTITUTIONAL: No weakness, fevers or chills  EYES/ENT: No visual changes;  No vertigo or throat pain   NECK: No pain or stiffness  RESPIRATORY: No cough, wheezing, hemoptysis; mild dyspnea on exertion at times  CARDIOVASCULAR: No chest pain or palpitations  GASTROINTESTINAL: No abdominal or epigastric pain. No nausea, vomiting, or hematemesis; No diarrhea or constipation. No melena or hematochezia.  GENITOURINARY: No dysuria, frequency or hematuria  NEUROLOGICAL: No numbness or weakness  SKIN: No itching, rashes

## 2020-08-11 NOTE — ASU DISCHARGE PLAN (ADULT/PEDIATRIC) - CALL YOUR DOCTOR IF YOU HAVE ANY OF THE FOLLOWING:
Bleeding that does not stop/Numbness, tingling, color or temperature change to extremity/Fever greater than (need to indicate Fahrenheit or Celsius)/Wound/Surgical Site with redness, or foul smelling discharge or pus

## 2020-08-11 NOTE — H&P CARDIOLOGY - HISTORY OF PRESENT ILLNESS
89 yr old male with PMH of HTN, PPM, CAD w/stents, paroxysmal afib (on Eliquis, last dose Sunday 8/9), NSVT, SESAR presented to Dr. Burkett in [place] for mild increase in dyspnea per patient's daughter. Patient denies chest pain, LE edema; device interrogation revealed no afib. Patient presents today for elective cardiac cath to evaluate dyspnea. 89 yr old male with PMH of HTN, PPM, CAD w/stents, paroxysmal afib (on Eliquis, last dose Sunday 8/9), NSVT, SESAR presented to Dr. Burkett in [place] for mild increase in dyspnea per patient's daughter. Patient denies chest pain, LE edema; device interrogation revealed no afib. Echo performed at Dr. Lisker's office 7/23 revealed EF 36%, severe LVSD, mod-severe MR, mild AR. Patient presents today for cardiac cath for ischemic workup of LVSD. 89 yr old male with PMH of HTN, PPM, CAD w/stents, paroxysmal afib (on Eliquis, last dose Sunday 8/9), NSVT, SESAR presented to Dr. Burkett in [place] for mild increase in dyspnea per patient's daughter. Patient denies chest pain, LE edema; device interrogation revealed no afib. Echo performed at Dr. Lisker's office 7/23 revealed EF 36%, severe LVSD, mod-severe MR, mild AR. Patient presents today for cardiac cath for ischemic workup of LVSD.   COVID negative 8/9/2020.

## 2020-08-11 NOTE — H&P CARDIOLOGY - PMH
BPH (benign prostatic hyperplasia)    Coronary artery disease of native artery of native heart with stable angina pectoris    HLD (hyperlipidemia)    Hypertension    Pacemaker  [employer]

## 2020-08-11 NOTE — H&P CARDIOLOGY - PSH
3-vessel coronary artery disease    H/O bilateral hip replacements    H/O carpal tunnel repair    History of appendectomy    History of right cataract surgery  bilat cataract sx  S/P drug eluting coronary stent placement

## 2020-08-11 NOTE — ASU DISCHARGE PLAN (ADULT/PEDIATRIC) - ASU DC SPECIAL INSTRUCTIONSFT
Continue your medications. No heavy lifting, strenuous activity, bending, straining or unnecessary stair climbing for 2 weeks. No driving for 2 days. You may shower 24 hours following procedure but avoid baths and swimming for 1 week. Check groin site for bleeding and/or swelling daily following procedure. Call your doctor/cardiologist immediately for bleeding or swelling or if you have increased/persistent pain or drainage at the site. Follow up with your cardiologist in 1- 2 weeks. You may call [facility] at [phone] or [phone] after office hours and weekends with any questions or concerns following your procedure. Continue your medications. Take aspirin, Plavix, Eliquis together for 30 days, then STOP aspirin and continue Plavix and Eliquis. No heavy lifting, strenuous activity, bending, straining or unnecessary stair climbing for 2 weeks. No driving for 2 days. You may shower 24 hours following procedure but avoid baths and swimming for 1 week. Check groin site for bleeding and/or swelling daily following procedure. Call your doctor/cardiologist immediately for bleeding or swelling or if you have increased/persistent pain or drainage at the site. Follow up with your cardiologist in 1- 2 weeks. You may call [facility] at [phone] or [phone] after office hours and weekends with any questions or concerns following your procedure.

## 2020-08-17 ENCOUNTER — APPOINTMENT (OUTPATIENT)
Dept: CV DIAGNOSTICS | Facility: HOSPITAL | Age: 85
End: 2020-08-17

## 2020-09-06 ENCOUNTER — APPOINTMENT (OUTPATIENT)
Dept: DISASTER EMERGENCY | Facility: CLINIC | Age: 85
End: 2020-09-06

## 2020-09-07 LAB — SARS-COV-2 N GENE NPH QL NAA+PROBE: NOT DETECTED

## 2020-09-09 ENCOUNTER — OUTPATIENT (OUTPATIENT)
Dept: INPATIENT UNIT | Facility: HOSPITAL | Age: 85
LOS: 1 days | Discharge: HOME | End: 2020-09-09
Payer: COMMERCIAL

## 2020-09-09 VITALS
OXYGEN SATURATION: 97 % | HEIGHT: 67 IN | HEART RATE: 61 BPM | RESPIRATION RATE: 16 BRPM | SYSTOLIC BLOOD PRESSURE: 137 MMHG | DIASTOLIC BLOOD PRESSURE: 60 MMHG | WEIGHT: 173.94 LBS | TEMPERATURE: 98 F

## 2020-09-09 VITALS
HEART RATE: 60 BPM | RESPIRATION RATE: 16 BRPM | OXYGEN SATURATION: 98 % | SYSTOLIC BLOOD PRESSURE: 110 MMHG | TEMPERATURE: 99 F | DIASTOLIC BLOOD PRESSURE: 52 MMHG

## 2020-09-09 DIAGNOSIS — I25.10 ATHEROSCLEROTIC HEART DISEASE OF NATIVE CORONARY ARTERY WITHOUT ANGINA PECTORIS: Chronic | ICD-10-CM

## 2020-09-09 DIAGNOSIS — Z96.643 PRESENCE OF ARTIFICIAL HIP JOINT, BILATERAL: Chronic | ICD-10-CM

## 2020-09-09 DIAGNOSIS — Z95.5 PRESENCE OF CORONARY ANGIOPLASTY IMPLANT AND GRAFT: Chronic | ICD-10-CM

## 2020-09-09 DIAGNOSIS — Z90.49 ACQUIRED ABSENCE OF OTHER SPECIFIED PARTS OF DIGESTIVE TRACT: Chronic | ICD-10-CM

## 2020-09-09 DIAGNOSIS — I25.10 ATHEROSCLEROTIC HEART DISEASE OF NATIVE CORONARY ARTERY WITHOUT ANGINA PECTORIS: ICD-10-CM

## 2020-09-09 DIAGNOSIS — Z98.890 OTHER SPECIFIED POSTPROCEDURAL STATES: Chronic | ICD-10-CM

## 2020-09-09 LAB
ANION GAP SERPL CALC-SCNC: 11 MMOL/L — SIGNIFICANT CHANGE UP (ref 5–17)
BUN SERPL-MCNC: 33 MG/DL — HIGH (ref 7–23)
CALCIUM SERPL-MCNC: 9.4 MG/DL — SIGNIFICANT CHANGE UP (ref 8.4–10.5)
CHLORIDE SERPL-SCNC: 103 MMOL/L — SIGNIFICANT CHANGE UP (ref 96–108)
CO2 SERPL-SCNC: 25 MMOL/L — SIGNIFICANT CHANGE UP (ref 22–31)
CREAT SERPL-MCNC: 1.09 MG/DL — SIGNIFICANT CHANGE UP (ref 0.5–1.3)
GLUCOSE SERPL-MCNC: 94 MG/DL — SIGNIFICANT CHANGE UP (ref 70–99)
HCT VFR BLD CALC: 34.6 % — LOW (ref 39–50)
HGB BLD-MCNC: 10.8 G/DL — LOW (ref 13–17)
MCHC RBC-ENTMCNC: 29.8 PG — SIGNIFICANT CHANGE UP (ref 27–34)
MCHC RBC-ENTMCNC: 31.2 GM/DL — LOW (ref 32–36)
MCV RBC AUTO: 95.3 FL — SIGNIFICANT CHANGE UP (ref 80–100)
NRBC # BLD: 0 /100 WBCS — SIGNIFICANT CHANGE UP (ref 0–0)
PLATELET # BLD AUTO: 214 K/UL — SIGNIFICANT CHANGE UP (ref 150–400)
POTASSIUM SERPL-MCNC: 4.2 MMOL/L — SIGNIFICANT CHANGE UP (ref 3.5–5.3)
POTASSIUM SERPL-SCNC: 4.2 MMOL/L — SIGNIFICANT CHANGE UP (ref 3.5–5.3)
RBC # BLD: 3.63 M/UL — LOW (ref 4.2–5.8)
RBC # FLD: 14.7 % — HIGH (ref 10.3–14.5)
SODIUM SERPL-SCNC: 139 MMOL/L — SIGNIFICANT CHANGE UP (ref 135–145)
WBC # BLD: 8.68 K/UL — SIGNIFICANT CHANGE UP (ref 3.8–10.5)
WBC # FLD AUTO: 8.68 K/UL — SIGNIFICANT CHANGE UP (ref 3.8–10.5)

## 2020-09-09 RX ORDER — ASPIRIN/CALCIUM CARB/MAGNESIUM 324 MG
1 TABLET ORAL
Qty: 0 | Refills: 0 | DISCHARGE

## 2020-09-09 RX ORDER — APIXABAN 2.5 MG/1
1 TABLET, FILM COATED ORAL
Qty: 0 | Refills: 0 | DISCHARGE

## 2020-09-09 NOTE — CHART NOTE - NSCHARTNOTEFT_GEN_A_CORE
FEMORAL ANGIOSEAL ASSESSMENT NOTE    Right groin angioseal in place with good hemostatis w/ small hematoma no bruit present, manual pressure applied and hematoma resolved. Site soft nontender to palpation  Pulses in the RIGHT lower extremity are palpable, (right femoral and right dorsalis pedal pulses + 2).  Right groin is soft/non tender  Patient denies leg, foot  or chest pain  post 12 lead EKG WNL, no ST or T wave changes noted when compared to pre procedural EKG    Complications: None    Comments: patient is to remain bed rest for the next 2 hours.

## 2020-09-09 NOTE — H&P CARDIOLOGY - HISTORY OF PRESENT ILLNESS
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  Patient known to Dr. Burkett, had mild increase in dyspnea in June. Patient denies chest pain, LE edema; device interrogation revealed no afib. Echo performed at Dr. Lisker's office 7/23 revealed EF 36%, severe LVSD, mod-severe MR, mild AR.    < from: Cardiac Cath Lab - Adult (08.11.20 @ 14:33) >  CORONARY VESSELS: The coronary circulation is right dominant.  LM:   --  LM: There was a tubular 0 % stenosis at the site of a prior  stent, within the stented segment.  LAD:   --  Ostial LAD: There was a tubular 0 % stenosis at the site of a  prior stent, in-stent.  --  Proximal LAD: There was a diffuse 10 % stenosis at thesite of a prior  stent, in-stent.  CX:   --  Ostial circumflex: There was a discrete 99 % stenosis at the site  of a prior stent, in-stent.  --  Proximal circumflex: There was a discrete 50 % stenosis at the site of  a prior stent, at the distal margin of the stented segment.  --  OM1: There was a tubular 25 % stenosis at the site of a prior stent,  in-stent.  RCA:   --  Mid RCA: There was a 100 % stenosis. Chronic total occlusion.  COMPLICATIONS: There were no complications.  DIAGNOSTIC IMPRESSIONS: Critical Lcx stenosis, Otherwise patent stents.  INTERVENTIONAL RECOMMENDATIONS: Aspirin and Plavix. Eliquis to resume  tomorrow. Return in 4-6 weeks for brachytherapy.  Prepared and signed by  Nesha Burkett M.D.  Signed 08/12/2020 11:29:10    < end of copied text >

## 2020-09-09 NOTE — ASU DISCHARGE PLAN (ADULT/PEDIATRIC) - PROVIDER TOKENS
PROVIDER:[TOKEN:[[id number]],FOLLOWUP:[2 weeks],ESTABLISHEDPATIENT:[T]] PROVIDER:[TOKEN:[[id number]],FOLLOWUP:[2 weeks],ESTABLISHEDPATIENT:[T]]

## 2020-09-09 NOTE — H&P CARDIOLOGY - PMH
BPH (benign prostatic hyperplasia)    Coronary artery disease of native artery of native heart with stable angina pectoris    HLD (hyperlipidemia)    Hypertension    Pacemaker  [employer]

## 2020-09-09 NOTE — ASU PATIENT PROFILE, ADULT - PMH
BPH (benign prostatic hyperplasia)    Coronary artery disease of native artery of native heart with stable angina pectoris    HLD (hyperlipidemia)    Hypertension    Pacemaker  [employer]

## 2020-09-09 NOTE — ASU DISCHARGE PLAN (ADULT/PEDIATRIC) - ASU DC SPECIAL INSTRUCTIONSFT
***Resume Eliquis on 9/10/2020 in PM  ***Take Aspirin 81mg daily, plavix 75mg daily and Eliquis 5mg daily until 9/14/2020.    ***Starting 9/15/2020 only take Plavix 75mg daily and Eliquis 5mg daily. NO ASPIRIN     Wound Care:   the day AFTER your procedure remove bandage GENTLY, and clean using  mild soap and gentle warm, water stream, pat dry. leave OPEN to air. YOU MAY SHOWER   DO NOT apply lotions, creams, ointments, powder, perfumes to your incision site  DO NOT SOAK your site for 1 week ( no baths, no pools, no tubs, etc...)  Check  your groin and /or wrist daily. A small amount of bruising, and soreness are normal    ACTIVITY: for 24 hours   - DO NOT DRIVE  - DO NOT make any important decisions or sign legal documents   - DO NOT operate heavy machineries   - you may resume sexual activity in 48 hours, unless otherwise instructed by your cardiologist     If your procedure was done through the WRIST: for the NEXT 3DAYS:  - avoid pushing, pulling, with that affected wrist   - avoid repeated movement of that hand and wrist ( eg: typing, hammering)  - DO NOT LIFT anything more than 5 lbs     If your procedure was done through the GROIN: for the NEXT 5 DAYS  - Limit climbing stairs, DO NOT soak in bathtub or pool  - no strenuous activities, pushing, pulling, straining  - Do not lift anything 10lbs or heavier     MEDICATION:   take your medications as explained ( see discharge paperwork)   If you received a STENT, you will be taking antiplatelet medications to KEEP YOUR STENT OPEN ( eg: Aspirin, Plavix).  Take as prescribed DO NOT STOP taking them without consulting with your cardiologist first.     Follow heart healthy diet recommended by your doctor, , if you smoke STOP SMOKING ( may call [phone] for center of tobacco control if you need assistance)     CALL your doctor to make appointment in 2 WEEKS     ***CALL YOUR DOCTOR***  if you experience: fever, chills, body aches, or severe pain, swelling, redness, heat or yellow discharge at incision site  If you experience Bleeding or excruciating pain at the procedural site, swelling ( golf ball size) at your procedural site  If you experience CHEST PAIN  If you experience extremity numbness, tingling, temperature change ( of your procedural site)   If you are unable to reach your doctor, you may contact:   -Cardiology Office at [facility] at [phone] or   - [facility] [phone]  - [facility] [phone]

## 2020-09-09 NOTE — ASU DISCHARGE PLAN (ADULT/PEDIATRIC) - PROCEDURE
Left Heart Catheterization
Implemented All Fall Risk Interventions:  [place] to call system. Call bell, personal items and telephone within reach. Instruct patient to call for assistance. Room bathroom lighting operational. Non-slip footwear when patient is off stretcher. Physically safe environment: no spills, clutter or unnecessary equipment. Stretcher in lowest position, wheels locked, appropriate side rails in place. Provide visual cue, wrist band, yellow gown, etc. Monitor gait and stability. Monitor for mental status changes and reorient to person, place, and time. Review medications for side effects contributing to fall risk. Reinforce activity limits and safety measures with patient and family.

## 2020-09-09 NOTE — ASU DISCHARGE PLAN (ADULT/PEDIATRIC) - CALL YOUR DOCTOR IF YOU HAVE ANY OF THE FOLLOWING:
Pain not relieved by Medications/Bleeding that does not stop/Swelling that gets worse/Fever greater than (need to indicate Fahrenheit or Celsius)/Wound/Surgical Site with redness, or foul smelling discharge or pus/Numbness, tingling, color or temperature change to extremity

## 2020-09-09 NOTE — ASU DISCHARGE PLAN (ADULT/PEDIATRIC) - CARE PROVIDER_API CALL
Lisker, Jay J  CARDIOVASCULAR DISEASE  [address]  [place] [address]  Phone: [phone]  Fax: [phone]  Established Patient  Follow Up Time: 2 weeks Nesha Burkett)  Cardiovascular Disease; Interventional Cardiology  [address] [place] [address]  Phone: [phone]  Fax: [phone]  Established Patient  Follow Up Time: 2 weeks

## 2020-09-15 ENCOUNTER — RX RENEWAL (OUTPATIENT)
Age: 85
End: 2020-09-15
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
## 2020-09-15 DIAGNOSIS — E78.5 HYPERLIPIDEMIA, UNSPECIFIED: ICD-10-CM

## 2020-09-15 DIAGNOSIS — Z79.01 LONG TERM (CURRENT) USE OF ANTICOAGULANTS: ICD-10-CM

## 2020-09-15 DIAGNOSIS — I97.638 POSTPROCEDURAL HEMATOMA OF A CIRCULATORY SYSTEM ORGAN OR STRUCTURE FOLLOWING OTHER CIRCULATORY SYSTEM PROCEDURE: ICD-10-CM

## 2020-09-15 DIAGNOSIS — Z95.5 PRESENCE OF CORONARY ANGIOPLASTY IMPLANT AND GRAFT: ICD-10-CM

## 2020-09-15 DIAGNOSIS — G47.33 OBSTRUCTIVE SLEEP APNEA (ADULT) (PEDIATRIC): ICD-10-CM

## 2020-09-15 DIAGNOSIS — I25.119 ATHEROSCLEROTIC HEART DISEASE OF NATIVE CORONARY ARTERY WITH UNSPECIFIED ANGINA PECTORIS: ICD-10-CM

## 2020-09-15 DIAGNOSIS — I48.0 PAROXYSMAL ATRIAL FIBRILLATION: ICD-10-CM

## 2020-09-15 DIAGNOSIS — N40.0 BENIGN PROSTATIC HYPERPLASIA WITHOUT LOWER URINARY TRACT SYMPTOMS: ICD-10-CM

## 2020-09-15 DIAGNOSIS — Z95.0 PRESENCE OF CARDIAC PACEMAKER: ICD-10-CM
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
## 2020-09-15 DIAGNOSIS — T82.855A STENOSIS OF CORONARY ARTERY STENT, INITIAL ENCOUNTER: ICD-10-CM

## 2020-09-15 DIAGNOSIS — I34.0 NONRHEUMATIC MITRAL (VALVE) INSUFFICIENCY: ICD-10-CM

## 2020-09-15 DIAGNOSIS — I45.5 OTHER SPECIFIED HEART BLOCK: ICD-10-CM

## 2020-09-15 DIAGNOSIS — I35.1 NONRHEUMATIC AORTIC (VALVE) INSUFFICIENCY: ICD-10-CM
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
## 2020-09-15 DIAGNOSIS — I10 ESSENTIAL (PRIMARY) HYPERTENSION: ICD-10-CM

## 2020-09-15 RX ORDER — ASPIRIN/CALCIUM CARB/MAGNESIUM 324 MG
1 TABLET ORAL
Qty: 0 | Refills: 0 | DISCHARGE

## 2020-09-15 RX ORDER — APIXABAN 2.5 MG/1
1 TABLET, FILM COATED ORAL
Qty: 0 | Refills: 0 | DISCHARGE

## 2020-10-21 ENCOUNTER — APPOINTMENT (OUTPATIENT)
Dept: CARDIOLOGY | Facility: CLINIC | Age: 85
End: 2020-10-21

## 2020-11-12 ENCOUNTER — APPOINTMENT (OUTPATIENT)
Dept: CARDIOLOGY | Facility: CLINIC | Age: 85
End: 2020-11-12
Payer: MEDICARE

## 2020-11-12 ENCOUNTER — APPOINTMENT (OUTPATIENT)
Dept: ELECTROPHYSIOLOGY | Facility: CLINIC | Age: 85
End: 2020-11-12
Payer: MEDICARE

## 2020-11-12 ENCOUNTER — NON-APPOINTMENT (OUTPATIENT)
Age: 85
End: 2020-11-12

## 2020-11-12 VITALS — OXYGEN SATURATION: 98 % | SYSTOLIC BLOOD PRESSURE: 147 MMHG | DIASTOLIC BLOOD PRESSURE: 78 MMHG | HEART RATE: 65 BPM

## 2020-11-12 PROCEDURE — 99072 ADDL SUPL MATRL&STAF TM PHE: CPT

## 2020-11-12 PROCEDURE — 93000 ELECTROCARDIOGRAM COMPLETE: CPT

## 2020-11-12 PROCEDURE — 93280 PM DEVICE PROGR EVAL DUAL: CPT

## 2020-11-12 PROCEDURE — 99213 OFFICE O/P EST LOW 20 MIN: CPT

## 2020-11-12 NOTE — HISTORY OF PRESENT ILLNESS
[FreeTextEntry1] : Gianfranco is here for f/u\par s/p Brachytherapy to Lcx\par Feels well\par \par No CP\par No LE edema \par BP ok   \par Device reveals no Afib\par No bleeding

## 2020-11-12 NOTE — PHYSICAL EXAM
[General Appearance - Well Developed] : well developed [Normal Appearance] : normal appearance [Well Groomed] : well groomed [General Appearance - Well Nourished] : well nourished [No Deformities] : no deformities [General Appearance - In No Acute Distress] : no acute distress [Normal Conjunctiva] : the conjunctiva exhibited no abnormalities [Eyelids - No Xanthelasma] : the eyelids demonstrated no xanthelasmas [Normal Oral Mucosa] : normal oral mucosa [No Oral Pallor] : no oral pallor [No Oral Cyanosis] : no oral cyanosis [Normal Jugular Venous A Waves Present] : normal jugular venous A waves present [Normal Jugular Venous V Waves Present] : normal jugular venous V waves present [No Jugular Venous Correa A Waves] : no jugular venous correa A waves [Respiration, Rhythm And Depth] : normal respiratory rhythm and effort [Exaggerated Use Of Accessory Muscles For Inspiration] : no accessory muscle use [Auscultation Breath Sounds / Voice Sounds] : lungs were clear to auscultation bilaterally [Heart Rate And Rhythm] : heart rate and rhythm were normal [Heart Sounds] : normal S1 and S2 [Murmurs] : no murmurs present [Edema] : no peripheral edema present [Abdomen Soft] : soft [Abdomen Tenderness] : non-tender [Abdomen Mass (___ Cm)] : no abdominal mass palpated [Abnormal Walk] : normal gait [Gait - Sufficient For Exercise Testing] : the gait was sufficient for exercise testing [Nail Clubbing] : no clubbing of the fingernails [Cyanosis, Localized] : no localized cyanosis [Petechial Hemorrhages (___cm)] : no petechial hemorrhages [Skin Color & Pigmentation] : normal skin color and pigmentation [] : no rash [No Venous Stasis] : no venous stasis [Skin Lesions] : no skin lesions [No Skin Ulcers] : no skin ulcer [No Xanthoma] : no  xanthoma was observed [Oriented To Time, Place, And Person] : oriented to person, place, and time [Affect] : the affect was normal [Mood] : the mood was normal [No Anxiety] : not feeling anxious

## 2021-01-20 ENCOUNTER — APPOINTMENT (OUTPATIENT)
Dept: ELECTROPHYSIOLOGY | Facility: CLINIC | Age: 86
End: 2021-01-20
Payer: MEDICARE

## 2021-01-20 PROCEDURE — 93294 REM INTERROG EVL PM/LDLS PM: CPT

## 2021-01-20 PROCEDURE — 93296 REM INTERROG EVL PM/IDS: CPT

## 2021-03-25 ENCOUNTER — NON-APPOINTMENT (OUTPATIENT)
Age: 86
End: 2021-03-25

## 2021-03-25 ENCOUNTER — APPOINTMENT (OUTPATIENT)
Dept: ELECTROPHYSIOLOGY | Facility: CLINIC | Age: 86
End: 2021-03-25
Payer: MEDICARE

## 2021-03-25 ENCOUNTER — APPOINTMENT (OUTPATIENT)
Dept: CARDIOLOGY | Facility: CLINIC | Age: 86
End: 2021-03-25
Payer: MEDICARE

## 2021-03-25 VITALS
OXYGEN SATURATION: 95 % | DIASTOLIC BLOOD PRESSURE: 76 MMHG | HEIGHT: 68 IN | SYSTOLIC BLOOD PRESSURE: 129 MMHG | BODY MASS INDEX: 26.52 KG/M2 | WEIGHT: 175 LBS | HEART RATE: 58 BPM

## 2021-03-25 PROCEDURE — 99072 ADDL SUPL MATRL&STAF TM PHE: CPT

## 2021-03-25 PROCEDURE — 93280 PM DEVICE PROGR EVAL DUAL: CPT

## 2021-03-25 PROCEDURE — 99213 OFFICE O/P EST LOW 20 MIN: CPT

## 2021-03-25 PROCEDURE — 93000 ELECTROCARDIOGRAM COMPLETE: CPT

## 2021-03-26 NOTE — DISCUSSION/SUMMARY
[FreeTextEntry1] : No angina  - c/w current meds\par PAF- c/w NOAC\par BP acceptable \par No changes to meds\par  \par F/u 3-4 mo

## 2021-03-26 NOTE — HISTORY OF PRESENT ILLNESS
[FreeTextEntry1] : Gianfranco is here for f/u\par s/p Brachytherapy to Lcx\par Feels well\par \par No CP\par No LE edema \par BP ok   \par Brief episode of AF in Jan (seconds)\par No bleeding decreased strength/decreased endurance/narrow base of support/impaired postural control/impaired balance

## 2021-04-21 ENCOUNTER — APPOINTMENT (OUTPATIENT)
Dept: ELECTROPHYSIOLOGY | Facility: CLINIC | Age: 86
End: 2021-04-21

## 2021-05-19 ENCOUNTER — RX RENEWAL (OUTPATIENT)
Age: 86
End: 2021-05-19

## 2021-05-26 ENCOUNTER — RX RENEWAL (OUTPATIENT)
Age: 86
End: 2021-05-26

## 2021-06-02 ENCOUNTER — APPOINTMENT (OUTPATIENT)
Dept: CARDIOLOGY | Facility: CLINIC | Age: 86
End: 2021-06-02

## 2021-06-30 ENCOUNTER — APPOINTMENT (OUTPATIENT)
Dept: ELECTROPHYSIOLOGY | Facility: CLINIC | Age: 86
End: 2021-06-30
Payer: MEDICARE

## 2021-06-30 ENCOUNTER — NON-APPOINTMENT (OUTPATIENT)
Age: 86
End: 2021-06-30

## 2021-06-30 PROCEDURE — 93294 REM INTERROG EVL PM/LDLS PM: CPT

## 2021-06-30 PROCEDURE — 93296 REM INTERROG EVL PM/IDS: CPT

## 2021-07-02 ENCOUNTER — APPOINTMENT (OUTPATIENT)
Dept: ELECTROPHYSIOLOGY | Facility: CLINIC | Age: 86
End: 2021-07-02

## 2021-07-29 ENCOUNTER — APPOINTMENT (OUTPATIENT)
Dept: ELECTROPHYSIOLOGY | Facility: CLINIC | Age: 86
End: 2021-07-29
Payer: MEDICARE

## 2021-07-29 ENCOUNTER — NON-APPOINTMENT (OUTPATIENT)
Age: 86
End: 2021-07-29

## 2021-07-29 ENCOUNTER — APPOINTMENT (OUTPATIENT)
Dept: CARDIOLOGY | Facility: CLINIC | Age: 86
End: 2021-07-29
Payer: MEDICARE

## 2021-07-29 VITALS
DIASTOLIC BLOOD PRESSURE: 70 MMHG | OXYGEN SATURATION: 96 % | SYSTOLIC BLOOD PRESSURE: 133 MMHG | WEIGHT: 175 LBS | BODY MASS INDEX: 26.52 KG/M2 | HEART RATE: 64 BPM | HEIGHT: 68 IN

## 2021-07-29 PROCEDURE — 99214 OFFICE O/P EST MOD 30 MIN: CPT | Mod: 25

## 2021-07-29 PROCEDURE — 93000 ELECTROCARDIOGRAM COMPLETE: CPT | Mod: 59

## 2021-07-29 PROCEDURE — 93280 PM DEVICE PROGR EVAL DUAL: CPT

## 2021-07-30 NOTE — HISTORY OF PRESENT ILLNESS
[FreeTextEntry1] : Gianfranco is here for f/u\par s/p Brachytherapy to Lcx\par Notes mild increase in NELSON\par \par No CP\par No LE edema \par BP ok   \par Brief episode of AF in Jan (seconds) - none on todays interrogation\par No bleeding

## 2021-07-30 NOTE — CARDIOLOGY SUMMARY
[de-identified] : 7/29/21\par Atrial and Vent paced\par Pacemaker ECG, No further analysis \par INSUFFICIENT DATA\par

## 2021-07-30 NOTE — REASON FOR VISIT
[Symptom and Test Evaluation] : symptom and test evaluation [Arrhythmia/ECG Abnorrmalities] : arrhythmia/ECG abnormalities [Hyperlipidemia] : hyperlipidemia [Hypertension] : hypertension [Coronary Artery Disease] : coronary artery disease

## 2021-07-30 NOTE — DISCUSSION/SUMMARY
[FreeTextEntry1] : No angina  - c/w current meds; although with his NELSON I am not sure if it could be ischemia in origin. Will eval degree of MR with an Echo\par PAF- c/w NOAC\par BP acceptable \par No changes to meds\par \par echo ordered\par F/u 3-4 mo

## 2021-07-30 NOTE — PHYSICAL EXAM

## 2021-08-10 ENCOUNTER — APPOINTMENT (OUTPATIENT)
Dept: CARDIOLOGY | Facility: CLINIC | Age: 86
End: 2021-08-10
Payer: MEDICARE

## 2021-08-10 PROCEDURE — 93306 TTE W/DOPPLER COMPLETE: CPT

## 2021-09-02 ENCOUNTER — APPOINTMENT (OUTPATIENT)
Dept: CARDIOLOGY | Facility: CLINIC | Age: 86
End: 2021-09-02

## 2021-09-29 ENCOUNTER — NON-APPOINTMENT (OUTPATIENT)
Age: 86
End: 2021-09-29

## 2021-09-29 ENCOUNTER — APPOINTMENT (OUTPATIENT)
Dept: ELECTROPHYSIOLOGY | Facility: CLINIC | Age: 86
End: 2021-09-29
Payer: MEDICARE

## 2021-09-29 PROCEDURE — 93294 REM INTERROG EVL PM/LDLS PM: CPT

## 2021-09-29 PROCEDURE — 93296 REM INTERROG EVL PM/IDS: CPT

## 2021-12-15 ENCOUNTER — NON-APPOINTMENT (OUTPATIENT)
Age: 86
End: 2021-12-15

## 2021-12-15 ENCOUNTER — APPOINTMENT (OUTPATIENT)
Dept: CARDIOLOGY | Facility: CLINIC | Age: 86
End: 2021-12-15
Payer: MEDICARE

## 2021-12-15 VITALS
HEART RATE: 71 BPM | WEIGHT: 176 LBS | HEIGHT: 68 IN | BODY MASS INDEX: 26.67 KG/M2 | SYSTOLIC BLOOD PRESSURE: 103 MMHG | DIASTOLIC BLOOD PRESSURE: 64 MMHG | OXYGEN SATURATION: 95 %

## 2021-12-15 PROCEDURE — 99214 OFFICE O/P EST MOD 30 MIN: CPT

## 2021-12-15 PROCEDURE — 93000 ELECTROCARDIOGRAM COMPLETE: CPT

## 2021-12-16 NOTE — PHYSICAL EXAM

## 2021-12-16 NOTE — HISTORY OF PRESENT ILLNESS
[FreeTextEntry1] : Gianfranco is here for f/u\par s/p Brachytherapy to Lcx\par Dyspnea stable \par \par No CP\par No LE edema \par BP ok    \par No bleeding

## 2021-12-16 NOTE — CARDIOLOGY SUMMARY
[de-identified] : 12/15/21\par Electronic dual-chamber pacemaker \par Pacemaker ECG, No further analysis \par INSUFFICIENT DATA\par

## 2021-12-16 NOTE — DISCUSSION/SUMMARY
[FreeTextEntry1] : No angina  - c/w current meds;  Dyspnea appears stable and does not bother him\par PAF- c/w NOAC\par BP acceptable \par No changes to meds\par \par  \par F/u 3-4 mo

## 2021-12-29 ENCOUNTER — APPOINTMENT (OUTPATIENT)
Dept: ELECTROPHYSIOLOGY | Facility: CLINIC | Age: 86
End: 2021-12-29
Payer: MEDICARE

## 2021-12-29 ENCOUNTER — NON-APPOINTMENT (OUTPATIENT)
Age: 86
End: 2021-12-29

## 2021-12-29 PROCEDURE — 93294 REM INTERROG EVL PM/LDLS PM: CPT | Mod: NC

## 2021-12-29 PROCEDURE — 93296 REM INTERROG EVL PM/IDS: CPT | Mod: NC

## 2022-03-29 ENCOUNTER — RX RENEWAL (OUTPATIENT)
Age: 87
End: 2022-03-29

## 2022-03-30 ENCOUNTER — APPOINTMENT (OUTPATIENT)
Dept: ELECTROPHYSIOLOGY | Facility: CLINIC | Age: 87
End: 2022-03-30
Payer: MEDICARE

## 2022-03-30 ENCOUNTER — APPOINTMENT (OUTPATIENT)
Dept: CARDIOLOGY | Facility: CLINIC | Age: 87
End: 2022-03-30
Payer: MEDICARE

## 2022-03-30 ENCOUNTER — NON-APPOINTMENT (OUTPATIENT)
Age: 87
End: 2022-03-30

## 2022-03-30 VITALS — DIASTOLIC BLOOD PRESSURE: 68 MMHG | HEART RATE: 64 BPM | SYSTOLIC BLOOD PRESSURE: 106 MMHG

## 2022-03-30 DIAGNOSIS — I47.2 VENTRICULAR TACHYCARDIA: ICD-10-CM

## 2022-03-30 PROCEDURE — 93000 ELECTROCARDIOGRAM COMPLETE: CPT

## 2022-03-30 PROCEDURE — 93280 PM DEVICE PROGR EVAL DUAL: CPT

## 2022-03-30 PROCEDURE — 93000 ELECTROCARDIOGRAM COMPLETE: CPT | Mod: 59

## 2022-03-30 PROCEDURE — 99214 OFFICE O/P EST MOD 30 MIN: CPT

## 2022-04-01 PROBLEM — I47.2 NONSUSTAINED VENTRICULAR TACHYCARDIA: Status: ACTIVE | Noted: 2017-12-14

## 2022-04-01 NOTE — PHYSICAL EXAM
[No Acute Distress] : no acute distress [Frail] : frail [Ill-Appearing] : ill-appearing [Normal Conjunctiva] : normal conjunctiva [Normal Venous Pressure] : normal venous pressure [No Carotid Bruit] : no carotid bruit [Normal S1, S2] : normal S1, S2 [No Rub] : no rub [No Gallop] : no gallop [Murmur] : murmur [Clear Lung Fields] : clear lung fields [Good Air Entry] : good air entry [No Respiratory Distress] : no respiratory distress  [Soft] : abdomen soft [Non Tender] : non-tender [No Masses/organomegaly] : no masses/organomegaly [Normal Bowel Sounds] : normal bowel sounds [Normal Gait] : normal gait [No Edema] : no edema [No Cyanosis] : no cyanosis [No Clubbing] : no clubbing [No Varicosities] : no varicosities [No Rash] : no rash [No Skin Lesions] : no skin lesions [Moves all extremities] : moves all extremities [No Focal Deficits] : no focal deficits [Normal Speech] : normal speech [Alert and Oriented] : alert and oriented [Normal memory] : normal memory [de-identified] : 2/6

## 2022-04-01 NOTE — HISTORY OF PRESENT ILLNESS
[FreeTextEntry1] : Gianfranco is here for f/u\par Dyspnea stable \par \par No CP\par No LE edema \par BP ok    \par No bleeding

## 2022-04-01 NOTE — DISCUSSION/SUMMARY
[FreeTextEntry1] : No angina  - c/w current meds;  Dyspnea appears stable  \par PAF- c/w NOAC; very brief episodes of AF on PPM check\par BP acceptable \par No changes to meds\par \par  \par F/u 3-4 mo

## 2022-04-20 RX ORDER — NITROGLYCERIN 0.4 MG/1
0.4 TABLET SUBLINGUAL
Qty: 100 | Refills: 3 | Status: ACTIVE | COMMUNITY
Start: 2018-11-20 | End: 1900-01-01

## 2022-05-03 ENCOUNTER — RX RENEWAL (OUTPATIENT)
Age: 87
End: 2022-05-03

## 2022-06-01 ENCOUNTER — RX RENEWAL (OUTPATIENT)
Age: 87
End: 2022-06-01

## 2022-06-30 ENCOUNTER — NON-APPOINTMENT (OUTPATIENT)
Age: 87
End: 2022-06-30

## 2022-06-30 ENCOUNTER — APPOINTMENT (OUTPATIENT)
Dept: ELECTROPHYSIOLOGY | Facility: CLINIC | Age: 87
End: 2022-06-30

## 2022-06-30 PROCEDURE — 93296 REM INTERROG EVL PM/IDS: CPT

## 2022-06-30 PROCEDURE — 93294 REM INTERROG EVL PM/LDLS PM: CPT

## 2022-07-06 ENCOUNTER — APPOINTMENT (OUTPATIENT)
Dept: CARDIOLOGY | Facility: CLINIC | Age: 87
End: 2022-07-06

## 2022-07-06 VITALS
OXYGEN SATURATION: 95 % | HEART RATE: 62 BPM | DIASTOLIC BLOOD PRESSURE: 83 MMHG | HEIGHT: 68 IN | WEIGHT: 161 LBS | BODY MASS INDEX: 24.4 KG/M2 | SYSTOLIC BLOOD PRESSURE: 133 MMHG

## 2022-07-06 PROCEDURE — 99214 OFFICE O/P EST MOD 30 MIN: CPT | Mod: 25

## 2022-07-06 PROCEDURE — 93000 ELECTROCARDIOGRAM COMPLETE: CPT

## 2022-07-07 ENCOUNTER — NON-APPOINTMENT (OUTPATIENT)
Age: 87
End: 2022-07-07

## 2022-07-07 NOTE — CARDIOLOGY SUMMARY
[de-identified] : 7/6/22\par Electronic ventricular pacemaker \par Pacemaker ECG, No further analysis \par INSUFFICIENT DATA\par

## 2022-07-07 NOTE — HISTORY OF PRESENT ILLNESS
[FreeTextEntry1] : Gianfranco is here for f/u\par Dyspnea stable \par \par No CP\par No LE edema \par BP ok    \par No bleeding\par No Falls\par

## 2022-07-07 NOTE — DISCUSSION/SUMMARY
[FreeTextEntry1] : No angina  - c/w current meds;  Dyspnea appears stable  \par PAF- c/w NOAC; \par BP acceptable \par No changes to meds\par Echo prior to next visit\par  \par F/u 3-4 mo

## 2022-09-02 ENCOUNTER — APPOINTMENT (OUTPATIENT)
Dept: CARDIOLOGY | Facility: CLINIC | Age: 87
End: 2022-09-02

## 2022-09-02 PROCEDURE — 93306 TTE W/DOPPLER COMPLETE: CPT

## 2022-10-26 ENCOUNTER — NON-APPOINTMENT (OUTPATIENT)
Age: 87
End: 2022-10-26

## 2022-10-26 ENCOUNTER — APPOINTMENT (OUTPATIENT)
Dept: ELECTROPHYSIOLOGY | Facility: CLINIC | Age: 87
End: 2022-10-26

## 2022-10-26 PROCEDURE — 93296 REM INTERROG EVL PM/IDS: CPT

## 2022-10-26 PROCEDURE — 93294 REM INTERROG EVL PM/LDLS PM: CPT

## 2022-12-21 ENCOUNTER — APPOINTMENT (OUTPATIENT)
Dept: CARDIOLOGY | Facility: CLINIC | Age: 87
End: 2022-12-21

## 2022-12-21 ENCOUNTER — NON-APPOINTMENT (OUTPATIENT)
Age: 87
End: 2022-12-21

## 2022-12-21 ENCOUNTER — APPOINTMENT (OUTPATIENT)
Dept: ELECTROPHYSIOLOGY | Facility: CLINIC | Age: 87
End: 2022-12-21
Payer: MEDICARE

## 2022-12-21 VITALS
SYSTOLIC BLOOD PRESSURE: 127 MMHG | HEIGHT: 68 IN | DIASTOLIC BLOOD PRESSURE: 78 MMHG | HEART RATE: 62 BPM | WEIGHT: 163 LBS | OXYGEN SATURATION: 99 % | BODY MASS INDEX: 24.71 KG/M2

## 2022-12-21 PROCEDURE — 93000 ELECTROCARDIOGRAM COMPLETE: CPT

## 2022-12-21 PROCEDURE — 93000 ELECTROCARDIOGRAM COMPLETE: CPT | Mod: 59

## 2022-12-21 PROCEDURE — 99214 OFFICE O/P EST MOD 30 MIN: CPT | Mod: 25

## 2022-12-21 PROCEDURE — 93280 PM DEVICE PROGR EVAL DUAL: CPT

## 2022-12-22 NOTE — CARDIOLOGY SUMMARY
[de-identified] : 12/21/22\par Electronic ventricular pacemaker \par Pacemaker ECG, No further analysis \par INSUFFICIENT DATA\par

## 2022-12-22 NOTE — HISTORY OF PRESENT ILLNESS
[FreeTextEntry1] : Gianfranco is here for f/u\par Dyspnea stable \par Appears more frail than prior\par No CP\par No LE edema \par BP ok    \par No bleeding\par No Falls\par

## 2022-12-22 NOTE — PHYSICAL EXAM
[Well Nourished] : well nourished [No Acute Distress] : no acute distress [Obese] : obese [Frail] : frail [Normal Conjunctiva] : normal conjunctiva [Normal Venous Pressure] : normal venous pressure [No Carotid Bruit] : no carotid bruit [Normal S1, S2] : normal S1, S2 [No Rub] : no rub [No Gallop] : no gallop [Murmur] : murmur [Clear Lung Fields] : clear lung fields [Good Air Entry] : good air entry [No Respiratory Distress] : no respiratory distress  [Soft] : abdomen soft [Non Tender] : non-tender [No Masses/organomegaly] : no masses/organomegaly [Normal Bowel Sounds] : normal bowel sounds [Abnormal Gait] : abnormal gait [No Edema] : no edema [No Cyanosis] : no cyanosis [No Clubbing] : no clubbing [No Varicosities] : no varicosities [No Rash] : no rash [No Skin Lesions] : no skin lesions [Moves all extremities] : moves all extremities [No Focal Deficits] : no focal deficits [Normal Speech] : normal speech [Alert and Oriented] : alert and oriented [Normal memory] : normal memory

## 2022-12-22 NOTE — DISCUSSION/SUMMARY
[FreeTextEntry1] : No angina  - c/w current meds;  Dyspnea appears stable  \par PAF- c/w NOAC; - Dec 14 had multiple AF episodes but none before or after - will monitor for now and c/w AC (Eliquis 5mg as CR normal and BMI normal). If further AF would consider Amiodarone.\par BP acceptable \par No changes to meds \par  \par F/u 3-4 mo  [EKG obtained to assist in diagnosis and management of assessed problem(s)] : EKG obtained to assist in diagnosis and management of assessed problem(s)

## 2023-01-26 RX ORDER — METOPROLOL SUCCINATE 50 MG/1
50 TABLET, EXTENDED RELEASE ORAL
Qty: 90 | Refills: 3 | Status: DISCONTINUED | COMMUNITY
Start: 2017-12-13 | End: 2023-01-26

## 2023-01-26 RX ORDER — LISINOPRIL 2.5 MG/1
2.5 TABLET ORAL
Qty: 30 | Refills: 0 | Status: DISCONTINUED | COMMUNITY
Start: 2018-01-17 | End: 2023-01-26

## 2023-01-26 RX ORDER — CLOPIDOGREL 75 MG/1
75 TABLET, FILM COATED ORAL
Refills: 0 | Status: ACTIVE | COMMUNITY

## 2023-01-31 ENCOUNTER — APPOINTMENT (OUTPATIENT)
Dept: PULMONOLOGY | Facility: CLINIC | Age: 88
End: 2023-01-31
Payer: MEDICARE

## 2023-01-31 VITALS
DIASTOLIC BLOOD PRESSURE: 68 MMHG | OXYGEN SATURATION: 95 % | HEART RATE: 64 BPM | BODY MASS INDEX: 25.9 KG/M2 | RESPIRATION RATE: 17 BRPM | TEMPERATURE: 97.2 F | HEIGHT: 67 IN | SYSTOLIC BLOOD PRESSURE: 130 MMHG | WEIGHT: 165 LBS

## 2023-01-31 DIAGNOSIS — Z87.898 PERSONAL HISTORY OF OTHER SPECIFIED CONDITIONS: ICD-10-CM

## 2023-01-31 DIAGNOSIS — Z83.3 FAMILY HISTORY OF DIABETES MELLITUS: ICD-10-CM

## 2023-01-31 PROCEDURE — 71046 X-RAY EXAM CHEST 2 VIEWS: CPT

## 2023-01-31 PROCEDURE — 94010 BREATHING CAPACITY TEST: CPT

## 2023-01-31 PROCEDURE — 94618 PULMONARY STRESS TESTING: CPT

## 2023-01-31 PROCEDURE — 94727 GAS DIL/WSHOT DETER LNG VOL: CPT

## 2023-01-31 PROCEDURE — 99204 OFFICE O/P NEW MOD 45 MIN: CPT | Mod: 25

## 2023-01-31 PROCEDURE — 94729 DIFFUSING CAPACITY: CPT

## 2023-01-31 NOTE — PHYSICAL EXAM
[No Acute Distress] : no acute distress [Normal Oropharynx] : normal oropharynx [Normal Appearance] : normal appearance [No Neck Mass] : no neck mass [Normal Rate/Rhythm] : normal rate/rhythm [Normal S1, S2] : normal s1, s2 [No Murmurs] : no murmurs [No Resp Distress] : no resp distress [Clear to Auscultation Bilaterally] : clear to auscultation bilaterally [No Abnormalities] : no abnormalities [Benign] : benign [Normal Gait] : normal gait [No Clubbing] : no clubbing [No Cyanosis] : no cyanosis [No Edema] : no edema [FROM] : FROM [Normal Color/ Pigmentation] : normal color/ pigmentation [No Focal Deficits] : no focal deficits [Oriented x3] : oriented x3 [Normal Affect] : normal affect [III] : Mallampati Class: III [Kyphosis] : kyphosis [TextBox_2] : wheelchair  [TextBox_68] : I:E ratio 1:3; inspiratory crackle; mild expiratory wheeze

## 2023-01-31 NOTE — ADDENDUM
[FreeTextEntry1] : Documented by MIKE Arias acting as a scribe for Dr. Domingo Jackson on 01/31/2023 .\par \par All medical record entries made by the Scribe were at my, Dr. Domingo Jackson's, direction and personally dictated by me on 01/31/2023. I have reviewed the chart and agree that the record accurately reflects my personal performance of the history, physical exam, assessment and plan. I have also personally directed, reviewed, and agree with the discharge instructions.

## 2023-01-31 NOTE — REASON FOR VISIT
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

## 2023-01-31 NOTE — ASSESSMENT
[FreeTextEntry1] : Mr. 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", known SESAR\par \par His shortness of breath is multifactorial due to:\par -poor mechanics of breathing \par -out of shape \par -poor balance\par -pulmonary disease\par   -restrictive dysfunction (?diaphragm dysfunction)\par   -asthma\par -cardiac disease ([facility])\par \par problem 1: poor balance\par -scripted balance therapy ([facility])\par \par problem 2: ?diaphragm dysfunction\par -complete fluoroscopy of diaphragm\par \par Problem 3: asthma\par -add Trelegy ( 100 ) 1 puff QD \par -d/c Symbicort 160 2 inhalations BID \par -Asthma is believed to be caused by inherited (genetic) and environmental factor, but its exact cause is unknown. Asthma may be triggered by allergens, lung infections, or irritants in the air. Asthma triggers are different for each person. \par -Inhaler technique reviewed as well as oral hygiene techniques reviewed with patient. Avoidance of cold air, extremes of temperature; rescue inhaler should be used before exercise. Order of medication reviewed with patient. Recommended use of a cool mist humidifier in the bedroom. \par \par Problem 4: allergy/sinus and ?Fe anemia\par -continue Flonase 1 sniff/nostril BID PRN\par -complete blood work to include: asthma panel, food IgE panel, IgE level, eosinophil level, vitamin D level \par -complete blood work to include: iron studies, Ferritin level\par \par Problem 5: SESAR\par -recommended DD (Roberts/ Andrea)\par -Sleep apnea is associated with adverse clinical consequences which can affect most organ systems.\par Cardiovascular disease risk includes arrhythmias, atrial fibrillation, hypertension, coronary artery disease, and stroke. Metabolic disorders include diabetes type 2, non-alcoholic fatty liver disease. Mood disorder especially depression; and cognitive decline especially in the elderly. Associations with chronic reflux/Lewis’s esophagus some but not all inclusive.\par -Reasons include arousal consistent with hypopnea; respiratory events most prominent in REM sleep or supine position; therefore sleep staging and body position are important for accurate diagnosis and estimation of AHI. \par \par problem 6: cardiac disease\par -recommended to continue to follow up with Cardiologist ([facility])\par \par problem 7: poor breathing mechanics\par -Proper breathing techniques were reviewed with an emphasis of exhalation. Patient instructed to breath in for 1 second and out for four seconds. Patient was encouraged to not talk while walking. \par \par problem 8: out of shape\par -Weight loss, exercise, and diet control were discussed and are highly encouraged. Treatment options are given such as, aqua therapy, and contacting a nutritionist. Recommended to use the elliptical, stationary bike, less use of treadmill. \par \par problem 9: health maintenance \par -recommended yearly flu shot after October 15\par -recommended strep pneumonia vaccines: Prevnar-20 vaccine, followed by Pneumo vaccine 23 one year following after 65 years old. \par -recommended early intervention for Upper Respiratory Infections (URIs)\par -recommended regular osteoporosis evaluations\par -recommended early dermatological evaluations\par -recommended after the age of 50 to the age of 70, colonoscopy every 5 years\par \par F/U in 6-8 weeks.\par He is encouraged to call with any changes, concerns, or questions

## 2023-01-31 NOTE — HISTORY OF PRESENT ILLNESS
[TextBox_4] : Mr. 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 His chief complaint is\par \par -he notes his breathing is stable \par -he notes mild NELSON\par -he notes intermittent wheeze while at rest\par -he notes appetite is stable \par -he notes taking a nap after every meal\par -he notes snoring\par -he notes frequent nocturia\par -he notes balance is poor \par -s/p URI 2 weeks ago\par -he notes recurrent blepharitis\par -he notes weight is stable \par \par -he denies any headaches, nausea, emesis, fever, chills, sweats, chest pain, chest pressure, coughing, palpitations, constipation, diarrhea, vertigo, dysphagia, heartburn, reflux, itchy eyes, itchy ears, leg swelling, leg pain, arthralgias, myalgias, or sour taste in the mouth.

## 2023-01-31 NOTE — PROCEDURE
[FreeTextEntry1] : Full PFT reveals mild restrictive and mild obstructive dysfunction; FEV1 was 1.35L which is 58% of predicted; hyperinflated lung volumes; normal diffusion at 11.0, which is 77% of predicted; normal flow volume loop. \par \par 6 minute walk test reveals a low saturation of 95% with slight dyspnea or fatigue; walked 97.8 meters. The test stopped prior to 6 minutes due to patient fatigue/ arm pain\par \par Sleep study () revealed sleep apnea with an AHI/ЕКАТЕРИНА of 32.2, and a low oxygen saturation of %\par \par CXR reveals mild cardiomegaly; L PPM, LLL pleural reaction, elevated R hemidiaphragm\par \par Bloodwork (10.2022) revealed CMP wnl; WBC 8.3; Hg 12.7; HCT 38.3; ; eosinophils 4% or 332; homocysteine 21.3;

## 2023-03-02 ENCOUNTER — NON-APPOINTMENT (OUTPATIENT)
Age: 88
End: 2023-03-02

## 2023-03-03 ENCOUNTER — APPOINTMENT (OUTPATIENT)
Dept: RADIOLOGY | Facility: HOSPITAL | Age: 88
End: 2023-03-03

## 2023-03-22 ENCOUNTER — APPOINTMENT (OUTPATIENT)
Dept: ELECTROPHYSIOLOGY | Facility: CLINIC | Age: 88
End: 2023-03-22
Payer: MEDICARE

## 2023-03-22 ENCOUNTER — NON-APPOINTMENT (OUTPATIENT)
Age: 88
End: 2023-03-22

## 2023-03-22 PROCEDURE — 93294 REM INTERROG EVL PM/LDLS PM: CPT

## 2023-03-22 PROCEDURE — 93296 REM INTERROG EVL PM/IDS: CPT

## 2023-03-23 NOTE — H&P CARDIOLOGY - RS GEN HX ROS MEA POS PC
You can access the FollowMyHealth Patient Portal offered by [facility] by registering at the following website: [email or web address]. By joining [email or web address]’s FollowMyHealth portal, you will also be able to view your health information using other applications (apps) compatible with our system.
dyspnea

## 2023-03-29 ENCOUNTER — APPOINTMENT (OUTPATIENT)
Dept: SLEEP CENTER | Facility: CLINIC | Age: 88
End: 2023-03-29
Payer: MEDICARE

## 2023-03-29 ENCOUNTER — OUTPATIENT (OUTPATIENT)
Dept: OUTPATIENT SERVICES | Facility: HOSPITAL | Age: 88
LOS: 1 days | End: 2023-03-29
Payer: COMMERCIAL

## 2023-03-29 DIAGNOSIS — I25.10 ATHEROSCLEROTIC HEART DISEASE OF NATIVE CORONARY ARTERY WITHOUT ANGINA PECTORIS: Chronic | ICD-10-CM

## 2023-03-29 DIAGNOSIS — Z98.890 OTHER SPECIFIED POSTPROCEDURAL STATES: Chronic | ICD-10-CM

## 2023-03-29 DIAGNOSIS — Z95.5 PRESENCE OF CORONARY ANGIOPLASTY IMPLANT AND GRAFT: Chronic | ICD-10-CM

## 2023-03-29 DIAGNOSIS — Z96.643 PRESENCE OF ARTIFICIAL HIP JOINT, BILATERAL: Chronic | ICD-10-CM

## 2023-03-29 DIAGNOSIS — Z90.49 ACQUIRED ABSENCE OF OTHER SPECIFIED PARTS OF DIGESTIVE TRACT: Chronic | ICD-10-CM

## 2023-03-29 PROCEDURE — 95800 SLP STDY UNATTENDED: CPT

## 2023-03-29 PROCEDURE — 95800 SLP STDY UNATTENDED: CPT | Mod: 26

## 2023-03-31 ENCOUNTER — RX RENEWAL (OUTPATIENT)
Age: 88
End: 2023-03-31

## 2023-04-05 ENCOUNTER — APPOINTMENT (OUTPATIENT)
Dept: PULMONOLOGY | Facility: CLINIC | Age: 88
End: 2023-04-05
Payer: MEDICARE

## 2023-04-05 PROCEDURE — 99442: CPT

## 2023-04-13 DIAGNOSIS — G47.33 OBSTRUCTIVE SLEEP APNEA (ADULT) (PEDIATRIC): ICD-10-CM

## 2023-04-20 ENCOUNTER — RX RENEWAL (OUTPATIENT)
Age: 88
End: 2023-04-20

## 2023-04-26 ENCOUNTER — APPOINTMENT (OUTPATIENT)
Dept: ELECTROPHYSIOLOGY | Facility: CLINIC | Age: 88
End: 2023-04-26
Payer: MEDICARE

## 2023-04-26 ENCOUNTER — APPOINTMENT (OUTPATIENT)
Dept: CARDIOLOGY | Facility: CLINIC | Age: 88
End: 2023-04-26
Payer: MEDICARE

## 2023-04-26 ENCOUNTER — NON-APPOINTMENT (OUTPATIENT)
Age: 88
End: 2023-04-26

## 2023-04-26 VITALS — DIASTOLIC BLOOD PRESSURE: 71 MMHG | HEART RATE: 69 BPM | SYSTOLIC BLOOD PRESSURE: 120 MMHG

## 2023-04-26 PROCEDURE — 93000 ELECTROCARDIOGRAM COMPLETE: CPT

## 2023-04-26 PROCEDURE — 99214 OFFICE O/P EST MOD 30 MIN: CPT | Mod: 25

## 2023-04-26 PROCEDURE — 93280 PM DEVICE PROGR EVAL DUAL: CPT

## 2023-04-30 NOTE — CARDIOLOGY SUMMARY
[de-identified] : 4/26/23\par Electronic dual-chamber pacemaker \par Pacemaker ECG, No further analysis \par INSUFFICIENT DATA\par

## 2023-04-30 NOTE — DISCUSSION/SUMMARY
[FreeTextEntry1] : No angina  - c/w current meds;  Dyspnea appears stable  \par PAF- c/w NOAC; - No further sustained AF\par BP acceptable \par No changes to meds \par  \par F/u 3-4 mo  [EKG obtained to assist in diagnosis and management of assessed problem(s)] : EKG obtained to assist in diagnosis and management of assessed problem(s)

## 2023-04-30 NOTE — PHYSICAL EXAM
[Well Nourished] : well nourished [No Acute Distress] : no acute distress [Frail] : frail [Normal Conjunctiva] : normal conjunctiva [Normal Venous Pressure] : normal venous pressure [Normal S1, S2] : normal S1, S2 [No Carotid Bruit] : no carotid bruit [No Murmur] : no murmur [No Rub] : no rub [No Gallop] : no gallop [Clear Lung Fields] : clear lung fields [Good Air Entry] : good air entry [No Respiratory Distress] : no respiratory distress  [Soft] : abdomen soft [Non Tender] : non-tender [No Masses/organomegaly] : no masses/organomegaly [Normal Bowel Sounds] : normal bowel sounds [Normal Gait] : normal gait [No Edema] : no edema [No Cyanosis] : no cyanosis [No Clubbing] : no clubbing [No Varicosities] : no varicosities [No Rash] : no rash [No Skin Lesions] : no skin lesions [Moves all extremities] : moves all extremities [No Focal Deficits] : no focal deficits [Normal Speech] : normal speech [Alert and Oriented] : alert and oriented [Normal memory] : normal memory

## 2023-05-24 ENCOUNTER — RX RENEWAL (OUTPATIENT)
Age: 88
End: 2023-05-24

## 2023-05-24 RX ORDER — FLUTICASONE FUROATE, UMECLIDINIUM BROMIDE AND VILANTEROL TRIFENATATE 100; 62.5; 25 UG/1; UG/1; UG/1
100-62.5-25 POWDER RESPIRATORY (INHALATION)
Qty: 60 | Refills: 1 | Status: ACTIVE | COMMUNITY
Start: 2023-01-31 | End: 1900-01-01

## 2023-05-30 NOTE — ED PROVIDER NOTE - CPE EDP GASTRO NORM
Please see the below appointment scheduled on the portal and triage as necessary:      ----- Message from Sarkis Alvarado sent at 5/28/2023  9:34 PM CDT -----  Regarding: Appointment scheduled from Patient Portal  Contact: [phone]  Appointment For: Sarkis Alvarado ([id number])  Visit Type: PC FOLLOW-UP ([id number])    5/31/2023    3:10 PM  10 mins.  Rick Leonardo MD         [facility]    Patient Comments:  Needs ear cleaning ear wax build up. After 330pm anytime.     normal...

## 2023-06-06 ENCOUNTER — NON-APPOINTMENT (OUTPATIENT)
Age: 88
End: 2023-06-06

## 2023-06-06 ENCOUNTER — APPOINTMENT (OUTPATIENT)
Dept: PULMONOLOGY | Facility: CLINIC | Age: 88
End: 2023-06-06
Payer: MEDICARE

## 2023-06-06 VITALS
TEMPERATURE: 97.6 F | OXYGEN SATURATION: 96 % | SYSTOLIC BLOOD PRESSURE: 110 MMHG | HEART RATE: 70 BPM | BODY MASS INDEX: 25.9 KG/M2 | HEIGHT: 67 IN | RESPIRATION RATE: 17 BRPM | WEIGHT: 165 LBS | DIASTOLIC BLOOD PRESSURE: 70 MMHG

## 2023-06-06 PROCEDURE — 95012 NITRIC OXIDE EXP GAS DETER: CPT

## 2023-06-06 PROCEDURE — 94010 BREATHING CAPACITY TEST: CPT

## 2023-06-06 PROCEDURE — 99214 OFFICE O/P EST MOD 30 MIN: CPT | Mod: 25

## 2023-06-06 NOTE — ADDENDUM
[FreeTextEntry1] : Documented by Roxane Scott acting as a scribe for Dr. Domingo Jackson on 06/06/2023 .\par \par All medical record entries made by the Scribe were at my, Dr. Domingo Jackson's, direction and personally dictated by me on 06/06/2023. I have reviewed the chart and agree that the record accurately reflects my personal performance of the history, physical exam, assessment and plan. I have also personally directed, reviewed, and agree with the discharge instructions.

## 2023-06-06 NOTE — PHYSICAL EXAM
[No Acute Distress] : no acute distress [Normal Oropharynx] : normal oropharynx [III] : Mallampati Class: III [Normal Appearance] : normal appearance [No Neck Mass] : no neck mass [Normal Rate/Rhythm] : normal rate/rhythm [Normal S1, S2] : normal s1, s2 [No Murmurs] : no murmurs [No Resp Distress] : no resp distress [Clear to Auscultation Bilaterally] : clear to auscultation bilaterally [No Abnormalities] : no abnormalities [Kyphosis] : kyphosis [Benign] : benign [Normal Gait] : normal gait [No Clubbing] : no clubbing [No Cyanosis] : no cyanosis [No Edema] : no edema [FROM] : FROM [Normal Color/ Pigmentation] : normal color/ pigmentation [No Focal Deficits] : no focal deficits [Oriented x3] : oriented x3 [Normal Affect] : normal affect [TextBox_2] : wheelchair  [TextBox_68] : I:E ratio 1:3; clear [TextBox_80] : kyphotic

## 2023-06-06 NOTE — PROCEDURE
[FreeTextEntry1] : PFT reveals mild restrictive dysfunction with an FEV1 of  1.49L, which is 64% of predicted, with a normal flow volume loop.\par PFTs were performed to evaluate for asthma\par \par FENO was 10; a normal value being less than 25\par Fractional exhaled nitric oxide (FENO) is regarded as a simple, noninvasive method for assessing eosinophilic airway inflammation. Produced by a variety of cells within the lung, nitric oxide (NO) concentrations are generally low in healthy individuals. However, high concentrations of NO appear to be involved in nonspecific host defense mechanisms and chronic inflammatory diseases such as asthma. The American Thor[employer] Society (ATS) therefore has recommended using FENO to aid in the diagnosis and monitoring of eosinophilic airway inflammation and asthma, and for identifying steroid responsive individuals whose chronic respiratory symptoms may be caused by airway inflammation.

## 2023-06-06 NOTE — HISTORY OF PRESENT ILLNESS
[TextBox_4] : Mr. 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 His chief complaint is\par \par \par -he notes feeling generally well\par -he notes nocturnal heartburn and reflux controlled with Rx\par -he notes discharge in eyes controlled with Rx\par -he notes adequate sleep\par -he notes exercising (walking)\par -he notes taking Vitamin D supplement (2,000 IU)\par -he notes tolerating Trelegy\par \par \par -he denies any headaches, nausea, emesis, fever, chills, sweats, chest pain, chest pressure, coughing, wheezing, palpitations, constipation, diarrhea, vertigo, dysphagia, itchy eyes, itchy ears, leg swelling, arthralgias, myalgias, or sour taste in the mouth.\par

## 2023-06-06 NOTE — ASSESSMENT
[FreeTextEntry1] : Mr. 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",  known  SESAR- stable\par \par His shortness of breath is multifactorial due to:\par -poor mechanics of breathing \par -out of shape \par -poor balance\par -pulmonary disease\par   -restrictive dysfunction (?diaphragm dysfunction)\par   -asthma\par -cardiac disease ([facility]outis)\par \par problem 1: poor balance\par -scripted balance therapy ([facility])\par \par problem 2: ?diaphragm dysfunction\par -complete fluoroscopy of diaphragm ([facility])\par \par Problem 3: asthma\par -continue Trelegy ( 100 ) 1 puff QD \par -Asthma is believed to be caused by inherited (genetic) and environmental factor, but its exact cause is unknown. Asthma may be triggered by allergens, lung infections, or irritants in the air. Asthma triggers are different for each person. \par -Inhaler technique reviewed as well as oral hygiene techniques reviewed with patient. Avoidance of cold air, extremes of temperature; rescue inhaler should be used before exercise. Order of medication reviewed with patient. Recommended use of a cool mist humidifier in the bedroom. \par \par Problem 4: allergy/sinus and ?Fe anemia\par -continue Flonase 1 sniff/nostril BID PRN\par -complete blood work to include: asthma panel, food IgE panel, IgE level, eosinophil level, vitamin D level \par -complete blood work to include: iron studies, Ferritin level\par \par Problem 5: SESAR\par -recommended DD (Roberts/ Andrea)\par -Sleep apnea is associated with adverse clinical consequences which can affect most organ systems.\par Cardiovascular disease risk includes arrhythmias, atrial fibrillation, hypertension, coronary artery disease, and stroke. Metabolic disorders include diabetes type 2, non-alcoholic fatty liver disease. Mood disorder especially depression; and cognitive decline especially in the elderly. Associations with chronic reflux/Lewis’s esophagus some but not all inclusive.\par -Reasons include arousal consistent with hypopnea; respiratory events most prominent in REM sleep or supine position; therefore sleep staging and body position are important for accurate diagnosis and estimation of AHI. \par \par problem 6: cardiac disease\par -recommended to continue to follow up with Cardiologist (Kwadwo)\par \par problem 7: poor breathing mechanics\par -Proper breathing techniques were reviewed with an emphasis of exhalation. Patient instructed to breath in for 1 second and out for four seconds. Patient was encouraged to not talk while walking. \par \par problem 8: out of shape\par -Weight loss, exercise, and diet control were discussed and are highly encouraged. Treatment options are given such as, aqua therapy, and contacting a nutritionist. Recommended to use the elliptical, stationary bike, less use of treadmill. \par \par problem 9: health maintenance \par -recommended yearly flu shot after October 2022\par -recommended strep pneumonia vaccines: Prevnar-20 vaccine, followed by Pneumo vaccine 23 one year following after 65 years old (completed)\par -recommended early intervention for Upper Respiratory Infections (URIs)\par -recommended regular osteoporosis evaluations\par -recommended early dermatological evaluations\par -recommended after the age of 50 to the age of 70, colonoscopy every 5 years\par \par F/p in 3-4 months\par He is encouraged to call with any changes, concerns, or questions

## 2023-06-06 NOTE — REASON FOR VISIT
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

## 2023-07-15 ENCOUNTER — NON-APPOINTMENT (OUTPATIENT)
Age: 88
End: 2023-07-15

## 2023-07-16 ENCOUNTER — TRANSCRIPTION ENCOUNTER (OUTPATIENT)
Age: 88
End: 2023-07-16

## 2023-07-26 ENCOUNTER — NON-APPOINTMENT (OUTPATIENT)
Age: 88
End: 2023-07-26

## 2023-07-26 ENCOUNTER — APPOINTMENT (OUTPATIENT)
Dept: ELECTROPHYSIOLOGY | Facility: CLINIC | Age: 88
End: 2023-07-26
Payer: MEDICARE

## 2023-07-26 PROCEDURE — 93294 REM INTERROG EVL PM/LDLS PM: CPT

## 2023-07-26 PROCEDURE — 93296 REM INTERROG EVL PM/IDS: CPT

## 2023-08-18 NOTE — PRE-OP CHECKLIST - ALLERGY BAND ON
done/aspirin, NSAIDS, shellfish 1 Principal Discharge DX:	Dysuria  Secondary Diagnosis:	Abdominal pain

## 2023-08-29 ENCOUNTER — APPOINTMENT (OUTPATIENT)
Dept: CARDIOLOGY | Facility: CLINIC | Age: 88
End: 2023-08-29
Payer: MEDICARE

## 2023-08-29 VITALS
HEIGHT: 67 IN | BODY MASS INDEX: 25.9 KG/M2 | SYSTOLIC BLOOD PRESSURE: 148 MMHG | OXYGEN SATURATION: 96 % | WEIGHT: 165 LBS | DIASTOLIC BLOOD PRESSURE: 83 MMHG | HEART RATE: 58 BPM

## 2023-08-29 PROCEDURE — 93000 ELECTROCARDIOGRAM COMPLETE: CPT

## 2023-08-29 PROCEDURE — 99214 OFFICE O/P EST MOD 30 MIN: CPT | Mod: 25

## 2023-10-24 ENCOUNTER — APPOINTMENT (OUTPATIENT)
Dept: PULMONOLOGY | Facility: CLINIC | Age: 88
End: 2023-10-24

## 2023-11-07 ENCOUNTER — APPOINTMENT (OUTPATIENT)
Dept: ELECTROPHYSIOLOGY | Facility: CLINIC | Age: 88
End: 2023-11-07
Payer: MEDICARE

## 2023-11-07 ENCOUNTER — NON-APPOINTMENT (OUTPATIENT)
Age: 88
End: 2023-11-07

## 2023-11-07 ENCOUNTER — APPOINTMENT (OUTPATIENT)
Dept: CARDIOLOGY | Facility: CLINIC | Age: 88
End: 2023-11-07
Payer: MEDICARE

## 2023-11-07 VITALS — SYSTOLIC BLOOD PRESSURE: 115 MMHG | DIASTOLIC BLOOD PRESSURE: 56 MMHG | HEART RATE: 59 BPM

## 2023-11-07 PROCEDURE — 93000 ELECTROCARDIOGRAM COMPLETE: CPT | Mod: 77

## 2023-11-07 PROCEDURE — 93280 PM DEVICE PROGR EVAL DUAL: CPT

## 2023-11-07 PROCEDURE — 99214 OFFICE O/P EST MOD 30 MIN: CPT | Mod: 25

## 2023-11-07 PROCEDURE — 93000 ELECTROCARDIOGRAM COMPLETE: CPT | Mod: 59

## 2023-12-29 ENCOUNTER — APPOINTMENT (OUTPATIENT)
Dept: CV DIAGNOSTICS | Facility: HOSPITAL | Age: 88
End: 2023-12-29

## 2023-12-29 ENCOUNTER — OUTPATIENT (OUTPATIENT)
Dept: OUTPATIENT SERVICES | Facility: HOSPITAL | Age: 88
LOS: 1 days | End: 2023-12-29
Payer: COMMERCIAL

## 2023-12-29 ENCOUNTER — APPOINTMENT (OUTPATIENT)
Dept: CV DIAGNOSITCS | Facility: HOSPITAL | Age: 88
End: 2023-12-29

## 2023-12-29 DIAGNOSIS — I25.10 ATHEROSCLEROTIC HEART DISEASE OF NATIVE CORONARY ARTERY WITHOUT ANGINA PECTORIS: Chronic | ICD-10-CM

## 2023-12-29 DIAGNOSIS — Z90.49 ACQUIRED ABSENCE OF OTHER SPECIFIED PARTS OF DIGESTIVE TRACT: Chronic | ICD-10-CM

## 2023-12-29 DIAGNOSIS — Z98.890 OTHER SPECIFIED POSTPROCEDURAL STATES: Chronic | ICD-10-CM

## 2023-12-29 DIAGNOSIS — I10 ESSENTIAL (PRIMARY) HYPERTENSION: ICD-10-CM

## 2023-12-29 DIAGNOSIS — Z95.5 PRESENCE OF CORONARY ANGIOPLASTY IMPLANT AND GRAFT: Chronic | ICD-10-CM

## 2023-12-29 DIAGNOSIS — Z96.643 PRESENCE OF ARTIFICIAL HIP JOINT, BILATERAL: Chronic | ICD-10-CM

## 2023-12-29 PROCEDURE — 78452 HT MUSCLE IMAGE SPECT MULT: CPT | Mod: 26,MH

## 2023-12-29 PROCEDURE — 93017 CV STRESS TEST TRACING ONLY: CPT

## 2023-12-29 PROCEDURE — 78452 HT MUSCLE IMAGE SPECT MULT: CPT | Mod: MH

## 2023-12-29 PROCEDURE — 76377 3D RENDER W/INTRP POSTPROCES: CPT

## 2023-12-29 PROCEDURE — 93306 TTE W/DOPPLER COMPLETE: CPT | Mod: 26

## 2023-12-29 PROCEDURE — 93016 CV STRESS TEST SUPVJ ONLY: CPT | Mod: MH

## 2023-12-29 PROCEDURE — C8929: CPT

## 2023-12-29 PROCEDURE — A9500: CPT

## 2023-12-29 PROCEDURE — 93018 CV STRESS TEST I&R ONLY: CPT | Mod: MH

## 2024-01-15 ENCOUNTER — RX RENEWAL (OUTPATIENT)
Age: 89
End: 2024-01-15

## 2024-01-15 RX ORDER — APIXABAN 2.5 MG/1
2.5 TABLET, FILM COATED ORAL
Qty: 180 | Refills: 1 | Status: ACTIVE | COMMUNITY
Start: 2019-09-23 | End: 1900-01-01

## 2024-01-21 NOTE — HISTORY OF PRESENT ILLNESS
[FreeTextEntry1] : Gianfranco is here for f/u Feels more NELSON and fatigue No CP No LE edema  BP ok     No bleeding No Falls

## 2024-01-21 NOTE — DISCUSSION/SUMMARY
[FreeTextEntry1] : Worsening NELSON - Echo and stress test ordered to further assess PAF- c/w NOAC; - No further sustained AF BP acceptable  No changes to meds    F/u 3-4 mo  [EKG obtained to assist in diagnosis and management of assessed problem(s)] : EKG obtained to assist in diagnosis and management of assessed problem(s)

## 2024-02-06 ENCOUNTER — APPOINTMENT (OUTPATIENT)
Dept: PULMONOLOGY | Facility: CLINIC | Age: 89
End: 2024-02-06
Payer: MEDICARE

## 2024-02-06 ENCOUNTER — NON-APPOINTMENT (OUTPATIENT)
Age: 89
End: 2024-02-06

## 2024-02-06 ENCOUNTER — APPOINTMENT (OUTPATIENT)
Dept: ELECTROPHYSIOLOGY | Facility: CLINIC | Age: 89
End: 2024-02-06
Payer: MEDICARE

## 2024-02-06 DIAGNOSIS — G47.33 OBSTRUCTIVE SLEEP APNEA (ADULT) (PEDIATRIC): ICD-10-CM

## 2024-02-06 DIAGNOSIS — R26.89 OTHER ABNORMALITIES OF GAIT AND MOBILITY: ICD-10-CM

## 2024-02-06 DIAGNOSIS — J43.9 EMPHYSEMA, UNSPECIFIED: ICD-10-CM

## 2024-02-06 DIAGNOSIS — J45.909 UNSPECIFIED ASTHMA, UNCOMPLICATED: ICD-10-CM

## 2024-02-06 DIAGNOSIS — J98.4 EMPHYSEMA, UNSPECIFIED: ICD-10-CM

## 2024-02-06 PROCEDURE — 93296 REM INTERROG EVL PM/IDS: CPT

## 2024-02-06 PROCEDURE — 94010 BREATHING CAPACITY TEST: CPT

## 2024-02-06 PROCEDURE — 99214 OFFICE O/P EST MOD 30 MIN: CPT | Mod: 25

## 2024-02-06 PROCEDURE — 93294 REM INTERROG EVL PM/LDLS PM: CPT

## 2024-02-06 NOTE — ADDENDUM
[FreeTextEntry1] : Documented by Roxane Scott acting as a scribe for Dr. Domingo Jackson on 02/06/2024. All medical record entries made by the Scribe were at my, Dr. Domingo Jackson's, direction and personally dictated by me on 02/06/2024. I have reviewed the chart and agree that the record accurately reflects my personal performance of the history, physical exam, assessment and plan. I have also personally directed, reviewed, and agree with the discharge instructions.

## 2024-02-06 NOTE — REASON FOR VISIT
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

## 2024-02-06 NOTE — PHYSICAL EXAM
[No Acute Distress] : no acute distress [Normal Oropharynx] : normal oropharynx [III] : Mallampati Class: III [Normal Appearance] : normal appearance [No Neck Mass] : no neck mass [Normal Rate/Rhythm] : normal rate/rhythm [Normal S1, S2] : normal s1, s2 [No Resp Distress] : no resp distress [Clear to Auscultation Bilaterally] : clear to auscultation bilaterally [Kyphosis] : kyphosis [Benign] : benign [Normal Gait] : normal gait [No Clubbing] : no clubbing [No Cyanosis] : no cyanosis [FROM] : FROM [Normal Color/ Pigmentation] : normal color/ pigmentation [No Focal Deficits] : no focal deficits [Oriented x3] : oriented x3 [Normal Affect] : normal affect [No Murmurs] : no murmurs [No Edema] : no edema [TextBox_2] : wheelchair [TextBox_68] : I:E ratio 1:3; clear

## 2024-02-06 NOTE — PROCEDURE
[FreeTextEntry1] : PFTs revealed moderate restrictive dysfunction; FEV1 was 1.18L, which is 51.1% of predicted; normal flow volume loop. PFTs were performed to evaluate for SOB  ECHO (12/29/2023) revealed LVEF 27%, mild left ventricular diastolic dysfunction; normal RV cavity size, wall thickness, and reduced systolic function. Tricuspid annular plane systolic excursion (TAPSE) is 1.4 cm. No pericardial effusion. Significant decline to LV and RV systolic function compared to ECHO 4/25/2018

## 2024-02-06 NOTE — HISTORY OF PRESENT ILLNESS
[TextBox_4] : Mr. 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 His chief complaint is  -he notes SOB even at rest -he notes having back pain, for which he was getting injections -he notes he had a severe URI 12/2023. He was nebulizing 2-3 times per day -he denies treating his SESAR. He experiences SOB when he uses PAP therapy -he notes good quality of sleep   -he denies any headaches, nausea, emesis, fever, chills, sweats, chest pain, chest pressure, coughing, wheezing, palpitations, diarrhea, constipation, dysphagia, vertigo, leg swelling, itchy eyes, itchy ears, heartburn, reflux, or sour taste in the mouth.

## 2024-02-06 NOTE — ASSESSMENT
[FreeTextEntry1] : Mr. 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 Asthma may be triggered by allergens, lung infections, or irritants in the air. Asthma triggers are different for each person. -Inhaler technique reviewed as well as oral hygiene techniques reviewed with patient. Avoidance of cold air, extremes of temperature; rescue inhaler should be used before exercise. Order of medication reviewed with patient. Recommended use of a cool mist humidifier in the bedroom.  Problem 4: allergy/sinus and ?Fe anemia -continue Flonase 1 sniff/nostril BID PRN -complete blood work to include: asthma panel, food IgE panel, IgE level, eosinophil level, vitamin D level -complete blood work to include: iron studies, Ferritin level  Problem 5: SESAR -recommended DD (Armando/ Andrea) -BiPAP or CPAP (refused all 02/06/2024) -Sleep apnea is associated with adverse clinical consequences which can affect most organ systems. -Cardiovascular disease risk includes arrhythmias, atrial fibrillation, hypertension, coronary artery disease, and stroke. Metabolic disorders include diabetes type 2, non-alcoholic fatty liver disease. Mood disorder especially depression; and cognitive decline especially in the elderly. Associations with chronic reflux/Lewis's esophagus some but not all inclusive. -Reasons include arousal consistent with hypopnea; respiratory events most prominent in REM sleep or supine position; therefore sleep staging and body position are important for accurate diagnosis and estimation of AHI.  problem 6: cardiac disease- declining- biventricular failure -recommended to continue to follow up with Cardiologist (Kwadwo)  problem 7: poor breathing mechanics -Proper breathing techniques were reviewed with an emphasis of exhalation. Patient instructed to breath in for 1 second and out for four seconds. Patient was encouraged to not talk while walking.  problem 8: out of shape -Weight loss, exercise, and diet control were discussed and are highly encouraged. Treatment options are given such as, aqua therapy, and contacting a nutritionist. Recommended to use the elliptical, stationary bike, less use of treadmill.  problem 9: health maintenance -recommended yearly flu shot after October 15, 2023 -recommended strep pneumonia vaccines: Prevnar-20 vaccine (given in office 02/06/2024) (completed) -recommended early intervention for Upper Respiratory Infections (URIs) -recommended regular osteoporosis evaluations -recommended early dermatological evaluations -recommended after the age of 50 to the age of 70, colonoscopy every 5 years  F/P in 3-4 months He is encouraged to call with any changes, concerns, or questions.

## 2024-03-12 ENCOUNTER — APPOINTMENT (OUTPATIENT)
Dept: CARDIOLOGY | Facility: CLINIC | Age: 89
End: 2024-03-12
Payer: MEDICARE

## 2024-03-12 VITALS
OXYGEN SATURATION: 97 % | SYSTOLIC BLOOD PRESSURE: 133 MMHG | HEART RATE: 60 BPM | HEIGHT: 67 IN | DIASTOLIC BLOOD PRESSURE: 75 MMHG | WEIGHT: 162 LBS | BODY MASS INDEX: 25.43 KG/M2

## 2024-03-12 VITALS — SYSTOLIC BLOOD PRESSURE: 126 MMHG | DIASTOLIC BLOOD PRESSURE: 78 MMHG

## 2024-03-12 DIAGNOSIS — R06.02 SHORTNESS OF BREATH: ICD-10-CM

## 2024-03-12 PROCEDURE — 99215 OFFICE O/P EST HI 40 MIN: CPT | Mod: 25

## 2024-03-12 PROCEDURE — 93000 ELECTROCARDIOGRAM COMPLETE: CPT

## 2024-04-03 ENCOUNTER — RX RENEWAL (OUTPATIENT)
Age: 89
End: 2024-04-03

## 2024-04-03 RX ORDER — FUROSEMIDE 20 MG/1
20 TABLET ORAL DAILY
Qty: 90 | Refills: 3 | Status: ACTIVE | COMMUNITY
Start: 2019-06-04 | End: 1900-01-01

## 2024-05-10 PROBLEM — R06.02 SOB (SHORTNESS OF BREATH): Status: ACTIVE | Noted: 2023-01-31

## 2024-05-10 NOTE — DISCUSSION/SUMMARY
[FreeTextEntry1] : Given his stress test and symptoms - I discussed possible re-cath. But after considering it and discussing it with his daughter he has decided to continue with med Rx at this time. PAF- c/w NOAC;  BP acceptable  No changes to meds   Time spent reviewing cath and stress images F/u 3-4 mo  [EKG obtained to assist in diagnosis and management of assessed problem(s)] : EKG obtained to assist in diagnosis and management of assessed problem(s)

## 2024-05-10 NOTE — CARDIOLOGY SUMMARY
[de-identified] : 3/12/24 Sinus Rhythm -First degree A-V block -occasional ectopic ventricular beat   Mateusz = 235 -Left bundle branch block and left axis. -(age undetermined) Inferior -lateral and (age undetermined) Inferior infarct.

## 2024-05-10 NOTE — PHYSICAL EXAM
[Well Developed] : well developed [Well Nourished] : well nourished [No Acute Distress] : no acute distress [Frail] : frail [Normal Conjunctiva] : normal conjunctiva [Normal Venous Pressure] : normal venous pressure [No Carotid Bruit] : no carotid bruit [Normal S1, S2] : normal S1, S2 [No Murmur] : no murmur [No Rub] : no rub [No Gallop] : no gallop [Clear Lung Fields] : clear lung fields [No Respiratory Distress] : no respiratory distress  [Good Air Entry] : good air entry [Soft] : abdomen soft [Non Tender] : non-tender [No Masses/organomegaly] : no masses/organomegaly [Normal Bowel Sounds] : normal bowel sounds [Normal Gait] : normal gait [No Edema] : no edema [No Cyanosis] : no cyanosis [No Clubbing] : no clubbing [No Varicosities] : no varicosities [No Rash] : no rash [No Skin Lesions] : no skin lesions [Moves all extremities] : moves all extremities [No Focal Deficits] : no focal deficits [Normal Speech] : normal speech [Alert and Oriented] : alert and oriented [Normal memory] : normal memory

## 2024-05-10 NOTE — HISTORY OF PRESENT ILLNESS
Please recall for f/u visit with fibroscan in 1 yearThanks  [FreeTextEntry1] : Gianfranco is here for f/u Here to discuss possible cath  No LE edema  BP ok     No bleeding No Falls Dyspnea and chest pains stable

## 2024-05-21 ENCOUNTER — NON-APPOINTMENT (OUTPATIENT)
Age: 89
End: 2024-05-21

## 2024-05-21 ENCOUNTER — APPOINTMENT (OUTPATIENT)
Dept: ELECTROPHYSIOLOGY | Facility: CLINIC | Age: 89
End: 2024-05-21
Payer: MEDICARE

## 2024-05-21 ENCOUNTER — APPOINTMENT (OUTPATIENT)
Dept: CARDIOLOGY | Facility: CLINIC | Age: 89
End: 2024-05-21
Payer: MEDICARE

## 2024-05-21 VITALS
SYSTOLIC BLOOD PRESSURE: 146 MMHG | OXYGEN SATURATION: 96 % | HEART RATE: 81 BPM | BODY MASS INDEX: 23.54 KG/M2 | WEIGHT: 150 LBS | HEIGHT: 67 IN | DIASTOLIC BLOOD PRESSURE: 80 MMHG

## 2024-05-21 DIAGNOSIS — I10 ESSENTIAL (PRIMARY) HYPERTENSION: ICD-10-CM

## 2024-05-21 DIAGNOSIS — I48.0 PAROXYSMAL ATRIAL FIBRILLATION: ICD-10-CM

## 2024-05-21 DIAGNOSIS — I25.10 ATHEROSCLEROTIC HEART DISEASE OF NATIVE CORONARY ARTERY W/OUT ANGINA PECTORIS: ICD-10-CM

## 2024-05-21 DIAGNOSIS — R06.09 OTHER FORMS OF DYSPNEA: ICD-10-CM

## 2024-05-21 PROCEDURE — 93280 PM DEVICE PROGR EVAL DUAL: CPT

## 2024-05-21 PROCEDURE — 99214 OFFICE O/P EST MOD 30 MIN: CPT | Mod: 25

## 2024-05-21 PROCEDURE — 93000 ELECTROCARDIOGRAM COMPLETE: CPT

## 2024-05-23 PROBLEM — I48.0 PAROXYSMAL A-FIB: Status: ACTIVE | Noted: 2019-09-23

## 2024-05-23 PROBLEM — I25.10 CORONARY ARTERY DISEASE INVOLVING NATIVE CORONARY ARTERY WITHOUT ANGINA PECTORIS, UNSPECIFIED WHETHER NATIVE OR TRANSPLANTED HEART: Status: ACTIVE | Noted: 2018-02-08

## 2024-05-23 PROBLEM — I10 BENIGN HYPERTENSION: Status: ACTIVE | Noted: 2017-12-13

## 2024-05-23 PROBLEM — R06.09 DYSPNEA ON EXERTION: Status: ACTIVE | Noted: 2020-06-18

## 2024-05-23 NOTE — PHYSICAL EXAM
[Well Nourished] : well nourished [No Acute Distress] : no acute distress [Frail] : frail [Ill-Appearing] : ill-appearing [Normal Conjunctiva] : normal conjunctiva [Normal Venous Pressure] : normal venous pressure [No Carotid Bruit] : no carotid bruit [Normal S1, S2] : normal S1, S2 [No Murmur] : no murmur [No Rub] : no rub [No Gallop] : no gallop [Clear Lung Fields] : clear lung fields [Good Air Entry] : good air entry [No Respiratory Distress] : no respiratory distress  [Soft] : abdomen soft [Non Tender] : non-tender [No Masses/organomegaly] : no masses/organomegaly [Normal Bowel Sounds] : normal bowel sounds [Abnormal Gait] : abnormal gait [No Edema] : no edema [No Cyanosis] : no cyanosis [No Clubbing] : no clubbing [No Varicosities] : no varicosities [No Rash] : no rash [No Skin Lesions] : no skin lesions [Moves all extremities] : moves all extremities [No Focal Deficits] : no focal deficits [Normal Speech] : normal speech [Alert and Oriented] : alert and oriented [Normal memory] : normal memory

## 2024-05-23 NOTE — HISTORY OF PRESENT ILLNESS
[FreeTextEntry1] : Gianfranco is here for f/u   No LE edema  BP ok     No bleeding No Falls Dyspnea and chest pains he notes are the same - no change

## 2024-05-23 NOTE — DISCUSSION/SUMMARY
[EKG obtained to assist in diagnosis and management of assessed problem(s)] : EKG obtained to assist in diagnosis and management of assessed problem(s) [FreeTextEntry1] : Overall NELSON appears stable and CP appear rare - cont with med Rx at this time. PAF- c/w NOAC;  BP acceptable  No changes to meds   F/u 3-4 mo

## 2024-06-28 ENCOUNTER — NON-APPOINTMENT (OUTPATIENT)
Age: 89
End: 2024-06-28

## 2024-07-10 ENCOUNTER — APPOINTMENT (OUTPATIENT)
Dept: ELECTROPHYSIOLOGY | Facility: CLINIC | Age: 89
End: 2024-07-10
Payer: MEDICARE

## 2024-07-10 VITALS
BODY MASS INDEX: 23.86 KG/M2 | HEART RATE: 69 BPM | DIASTOLIC BLOOD PRESSURE: 81 MMHG | HEIGHT: 67 IN | SYSTOLIC BLOOD PRESSURE: 129 MMHG | OXYGEN SATURATION: 98 % | WEIGHT: 152 LBS

## 2024-07-10 DIAGNOSIS — I44.2 ATRIOVENTRICULAR BLOCK, COMPLETE: ICD-10-CM

## 2024-07-10 DIAGNOSIS — I48.0 PAROXYSMAL ATRIAL FIBRILLATION: ICD-10-CM

## 2024-07-10 DIAGNOSIS — I10 ESSENTIAL (PRIMARY) HYPERTENSION: ICD-10-CM

## 2024-07-10 DIAGNOSIS — I25.10 ATHEROSCLEROTIC HEART DISEASE OF NATIVE CORONARY ARTERY W/OUT ANGINA PECTORIS: ICD-10-CM

## 2024-07-10 DIAGNOSIS — R06.09 OTHER FORMS OF DYSPNEA: ICD-10-CM

## 2024-07-10 DIAGNOSIS — R06.02 SHORTNESS OF BREATH: ICD-10-CM

## 2024-07-10 PROCEDURE — 93000 ELECTROCARDIOGRAM COMPLETE: CPT | Mod: 59

## 2024-07-10 PROCEDURE — G2211 COMPLEX E/M VISIT ADD ON: CPT | Mod: NC

## 2024-07-10 PROCEDURE — 93280 PM DEVICE PROGR EVAL DUAL: CPT

## 2024-07-10 PROCEDURE — 99215 OFFICE O/P EST HI 40 MIN: CPT | Mod: 25

## 2024-07-24 ENCOUNTER — NON-APPOINTMENT (OUTPATIENT)
Age: 89
End: 2024-07-24

## 2024-07-30 ENCOUNTER — INPATIENT (INPATIENT)
Facility: HOSPITAL | Age: 89
LOS: 1 days | Discharge: HOME | DRG: 310 | End: 2024-08-01
Attending: HOSPITALIST | Admitting: INTERNAL MEDICINE
Payer: COMMERCIAL

## 2024-07-30 VITALS — WEIGHT: 147.93 LBS | HEIGHT: 67 IN

## 2024-07-30 DIAGNOSIS — Z95.5 PRESENCE OF CORONARY ANGIOPLASTY IMPLANT AND GRAFT: Chronic | ICD-10-CM

## 2024-07-30 DIAGNOSIS — I44.2 ATRIOVENTRICULAR BLOCK, COMPLETE: ICD-10-CM

## 2024-07-30 DIAGNOSIS — Z98.890 OTHER SPECIFIED POSTPROCEDURAL STATES: Chronic | ICD-10-CM

## 2024-07-30 DIAGNOSIS — Z90.49 ACQUIRED ABSENCE OF OTHER SPECIFIED PARTS OF DIGESTIVE TRACT: Chronic | ICD-10-CM

## 2024-07-30 DIAGNOSIS — Z96.643 PRESENCE OF ARTIFICIAL HIP JOINT, BILATERAL: Chronic | ICD-10-CM

## 2024-07-30 DIAGNOSIS — I25.10 ATHEROSCLEROTIC HEART DISEASE OF NATIVE CORONARY ARTERY WITHOUT ANGINA PECTORIS: Chronic | ICD-10-CM

## 2024-07-30 LAB
ANION GAP SERPL CALC-SCNC: 12 MMOL/L — SIGNIFICANT CHANGE UP (ref 5–17)
ANION GAP SERPL CALC-SCNC: 14 MMOL/L — SIGNIFICANT CHANGE UP (ref 5–17)
BUN SERPL-MCNC: 20 MG/DL — SIGNIFICANT CHANGE UP (ref 7–23)
BUN SERPL-MCNC: 22 MG/DL — SIGNIFICANT CHANGE UP (ref 7–23)
CALCIUM SERPL-MCNC: 10 MG/DL — SIGNIFICANT CHANGE UP (ref 8.4–10.5)
CALCIUM SERPL-MCNC: 9 MG/DL — SIGNIFICANT CHANGE UP (ref 8.4–10.5)
CHLORIDE SERPL-SCNC: 102 MMOL/L — SIGNIFICANT CHANGE UP (ref 96–108)
CHLORIDE SERPL-SCNC: 107 MMOL/L — SIGNIFICANT CHANGE UP (ref 96–108)
CO2 SERPL-SCNC: 22 MMOL/L — SIGNIFICANT CHANGE UP (ref 22–31)
CO2 SERPL-SCNC: 23 MMOL/L — SIGNIFICANT CHANGE UP (ref 22–31)
CREAT SERPL-MCNC: 1.23 MG/DL — SIGNIFICANT CHANGE UP (ref 0.5–1.3)
CREAT SERPL-MCNC: 1.25 MG/DL — SIGNIFICANT CHANGE UP (ref 0.5–1.3)
EGFR: 54 ML/MIN/1.73M2 — LOW
EGFR: 55 ML/MIN/1.73M2 — LOW
GLUCOSE SERPL-MCNC: 139 MG/DL — HIGH (ref 70–99)
GLUCOSE SERPL-MCNC: 86 MG/DL — SIGNIFICANT CHANGE UP (ref 70–99)
HCT VFR BLD CALC: 36.9 % — LOW (ref 39–50)
HCT VFR BLD CALC: 40.6 % — SIGNIFICANT CHANGE UP (ref 39–50)
HGB BLD-MCNC: 11.9 G/DL — LOW (ref 13–17)
HGB BLD-MCNC: 14.1 G/DL — SIGNIFICANT CHANGE UP (ref 13–17)
LACTATE SERPL-SCNC: 1.9 MMOL/L — SIGNIFICANT CHANGE UP (ref 0.5–2)
MAGNESIUM SERPL-MCNC: 2.2 MG/DL — SIGNIFICANT CHANGE UP (ref 1.6–2.6)
MCHC RBC-ENTMCNC: 30.7 PG — SIGNIFICANT CHANGE UP (ref 27–34)
MCHC RBC-ENTMCNC: 32.2 GM/DL — SIGNIFICANT CHANGE UP (ref 32–36)
MCHC RBC-ENTMCNC: 32.3 PG — SIGNIFICANT CHANGE UP (ref 27–34)
MCHC RBC-ENTMCNC: 34.7 GM/DL — SIGNIFICANT CHANGE UP (ref 32–36)
MCV RBC AUTO: 92.9 FL — SIGNIFICANT CHANGE UP (ref 80–100)
MCV RBC AUTO: 95.1 FL — SIGNIFICANT CHANGE UP (ref 80–100)
NRBC # BLD: 0 /100 WBCS — SIGNIFICANT CHANGE UP (ref 0–0)
NRBC # BLD: 0 /100 WBCS — SIGNIFICANT CHANGE UP (ref 0–0)
PLATELET # BLD AUTO: 166 K/UL — SIGNIFICANT CHANGE UP (ref 150–400)
PLATELET # BLD AUTO: 200 K/UL — SIGNIFICANT CHANGE UP (ref 150–400)
POTASSIUM SERPL-MCNC: 4.3 MMOL/L — SIGNIFICANT CHANGE UP (ref 3.5–5.3)
POTASSIUM SERPL-MCNC: 4.3 MMOL/L — SIGNIFICANT CHANGE UP (ref 3.5–5.3)
POTASSIUM SERPL-SCNC: 4.3 MMOL/L — SIGNIFICANT CHANGE UP (ref 3.5–5.3)
POTASSIUM SERPL-SCNC: 4.3 MMOL/L — SIGNIFICANT CHANGE UP (ref 3.5–5.3)
RBC # BLD: 3.88 M/UL — LOW (ref 4.2–5.8)
RBC # BLD: 4.37 M/UL — SIGNIFICANT CHANGE UP (ref 4.2–5.8)
RBC # FLD: 14.7 % — HIGH (ref 10.3–14.5)
RBC # FLD: 14.7 % — HIGH (ref 10.3–14.5)
SODIUM SERPL-SCNC: 139 MMOL/L — SIGNIFICANT CHANGE UP (ref 135–145)
SODIUM SERPL-SCNC: 141 MMOL/L — SIGNIFICANT CHANGE UP (ref 135–145)
WBC # BLD: 8.95 K/UL — SIGNIFICANT CHANGE UP (ref 3.8–10.5)
WBC # BLD: 9.62 K/UL — SIGNIFICANT CHANGE UP (ref 3.8–10.5)
WBC # FLD AUTO: 8.95 K/UL — SIGNIFICANT CHANGE UP (ref 3.8–10.5)
WBC # FLD AUTO: 9.62 K/UL — SIGNIFICANT CHANGE UP (ref 3.8–10.5)

## 2024-07-30 PROCEDURE — 33225 L VENTRIC PACING LEAD ADD-ON: CPT

## 2024-07-30 PROCEDURE — 99291 CRITICAL CARE FIRST HOUR: CPT

## 2024-07-30 PROCEDURE — 71045 X-RAY EXAM CHEST 1 VIEW: CPT | Mod: 26

## 2024-07-30 PROCEDURE — 93010 ELECTROCARDIOGRAM REPORT: CPT | Mod: 77

## 2024-07-30 PROCEDURE — 93010 ELECTROCARDIOGRAM REPORT: CPT | Mod: 76,77

## 2024-07-30 PROCEDURE — 33229 REMV&REPLC PM GEN MULT LEADS: CPT

## 2024-07-30 RX ORDER — TIOTROPIUM BROMIDE 18 UG/1
2 CAPSULE ORAL; RESPIRATORY (INHALATION) DAILY
Refills: 0 | Status: DISCONTINUED | OUTPATIENT
Start: 2024-07-30 | End: 2024-08-01

## 2024-07-30 RX ORDER — CEFAZOLIN SODIUM 10 G
2000 VIAL (EA) INJECTION ONCE
Refills: 0 | Status: DISCONTINUED | OUTPATIENT
Start: 2024-07-30 | End: 2024-07-31

## 2024-07-30 RX ORDER — ALFUZOSIN HYDROCHLORIDE 10 MG/1
1 TABLET, EXTENDED RELEASE ORAL
Refills: 0 | DISCHARGE

## 2024-07-30 RX ORDER — FLUTICASONE FUROATE, UMECLIDINIUM BROMIDE AND VILANTEROL TRIFENATATE 200; 62.5; 25 UG/1; UG/1; UG/1
1 POWDER RESPIRATORY (INHALATION)
Refills: 0 | DISCHARGE

## 2024-07-30 RX ORDER — ATORVASTATIN CALCIUM 40 MG/1
10 TABLET, FILM COATED ORAL AT BEDTIME
Refills: 0 | Status: DISCONTINUED | OUTPATIENT
Start: 2024-07-30 | End: 2024-08-01

## 2024-07-30 RX ORDER — IPRATROPIUM BROMIDE AND ALBUTEROL SULFATE 2.5; .5 MG/3ML; MG/3ML
3 SOLUTION RESPIRATORY (INHALATION) EVERY 6 HOURS
Refills: 0 | Status: DISCONTINUED | OUTPATIENT
Start: 2024-07-30 | End: 2024-08-01

## 2024-07-30 RX ORDER — TAMSULOSIN HCL 0.4 MG
0.8 CAPSULE ORAL AT BEDTIME
Refills: 0 | Status: DISCONTINUED | OUTPATIENT
Start: 2024-07-30 | End: 2024-08-01

## 2024-07-30 RX ORDER — FINASTERIDE 5 MG/1
5 TABLET, FILM COATED ORAL DAILY
Refills: 0 | Status: DISCONTINUED | OUTPATIENT
Start: 2024-07-30 | End: 2024-08-01

## 2024-07-30 RX ORDER — ACETAMINOPHEN 500 MG
1000 TABLET ORAL ONCE
Refills: 0 | Status: COMPLETED | OUTPATIENT
Start: 2024-07-30 | End: 2024-07-30

## 2024-07-30 RX ORDER — CHLORHEXIDINE GLUCONATE 500 MG/1
1 CLOTH TOPICAL ONCE
Refills: 0 | Status: COMPLETED | OUTPATIENT
Start: 2024-07-30 | End: 2024-07-31

## 2024-07-30 RX ORDER — BUDESONIDE AND FORMOTEROL FUMARATE DIHYDRATE 80; 4.5 UG/1; UG/1
2 AEROSOL RESPIRATORY (INHALATION)
Refills: 0 | Status: DISCONTINUED | OUTPATIENT
Start: 2024-07-30 | End: 2024-08-01

## 2024-07-30 RX ORDER — CLOPIDOGREL BISULFATE 75 MG/1
75 TABLET, FILM COATED ORAL DAILY
Refills: 0 | Status: DISCONTINUED | OUTPATIENT
Start: 2024-07-30 | End: 2024-08-01

## 2024-07-30 RX ADMIN — BUDESONIDE AND FORMOTEROL FUMARATE DIHYDRATE 2 PUFF(S): 80; 4.5 AEROSOL RESPIRATORY (INHALATION) at 21:50

## 2024-07-30 RX ADMIN — ATORVASTATIN CALCIUM 10 MILLIGRAM(S): 40 TABLET, FILM COATED ORAL at 21:50

## 2024-07-30 RX ADMIN — Medication 400 MILLIGRAM(S): at 19:48

## 2024-07-30 RX ADMIN — Medication 1000 MILLIGRAM(S): at 20:00

## 2024-07-30 NOTE — CHART NOTE - NSCHARTNOTEFT_GEN_A_CORE
CICU Accept Note    Transfer from: [facility]    Accepting physician: Dr. Esparza    HPI: 89 year old male, (Gibraltarian speaking, daughter at bedside, unable to hear well with , used daughter as ), with PMHx of, HTN, CAD s/p PCI w/HAYDER, mixed restrictive and obstructive pulmonary disease, SESAR (not on CPAP), [place],  PAF on Eliquis ( on hold since saturday), CHB s/p dual chamber( [employer]) PPM implantation on 2/21/2017, who has developed progressive fatigue and dyspnea with ADL referred  for possible CRT upgrade given his generator is at [facility].    ECHO(12/29/2023): LVEF 27%, severely decreased systolic function. Abnormal (paradoxical) septal motion consistent with RV pacemaker. Mild (grade I) LV diastolic dysfunction. The apex is dyskinetic. The apical septal segment and apical lateral segment are akinetic. The entire anterior wall, entire inferior wall, basal and mid anterolateral wall, basal and mid anterior septum, basal and mid inferior septum, and basal and mid inferolateral wall are hypokinetic. LA mildly dilated (JB 21.98). Mild AR/TR. Moderate MR. Mild pHTN.    NUCLEAR STRESS TEST(12/29/2023): Large sized moderate to severe, fixed defect in the inferoapical, apical lateral walls consistent with infarct. There is a medium size moderate, fixed defect in the inferior wall consistent with infarct. There is a small sized, mild to moderate fixed defect in the mid to basal anteroseptum consistent with infarct. No evidence of ischemia.    7/30 - Hypotensive post CRT upgrade implantation, noted with swelling at site s/p Mayur bolus, PRBC x 1 - s/p CTA chest revealing:  Transfer to CICU for close monitoring     Vital Signs Last 24 Hrs  T(C): 36.9 (30 Jul 2024 20:50), Max: 36.9 (30 Jul 2024 20:50)  T(F): 98.4 (30 Jul 2024 20:50), Max: 98.4 (30 Jul 2024 20:50)  HR: 65 (30 Jul 2024 20:50) (56 - 74)  BP: 71/34 (30 Jul 2024 20:50) (71/34 - 139/77)  BP(mean): 47 (30 Jul 2024 20:50) (39 - 99)  RR: 17 (30 Jul 2024 20:50) (16 - 20)  SpO2: 96% (30 Jul 2024 20:50) (92% - 100%)    Parameters below as of 30 Jul 2024 20:50  Patient On (Oxygen Delivery Method): room air      I&O's Summary    30 Jul 2024 07:01  -  30 Jul 2024 22:00  --------------------------------------------------------  IN: 0 mL / OUT: 0 mL / NET: 0 mL      Allergies: NSAIDS, Shellfish    MEDICATIONS  (STANDING):  albuterol/ipratropium for Nebulization 3 milliLiter(s) Nebulizer every 6 hours  atorvastatin 10 milliGRAM(s) Oral at bedtime  budesonide  80 MICROgram(s)/formoterol 4.5 MICROgram(s) Inhaler 2 Puff(s) Inhalation two times a day  ceFAZolin   IVPB 2000 milliGRAM(s) IV Intermittent once  chlorhexidine 4% Liquid 1 Application(s) Topical once  clopidogrel Tablet 75 milliGRAM(s) Oral daily  finasteride 5 milliGRAM(s) Oral daily  tamsulosin 0.8 milliGRAM(s) Oral at bedtime  tiotropium 2.5 MICROgram(s) Inhaler 2 Puff(s) Inhalation daily    LABS                                            11.9                  Neurophils% (auto):   x      (07-30 @ 21:28):    8.95 )-----------(166          Lymphocytes% (auto):  x                                             36.9                   Eosinphils% (auto):   x        Manual%: Neutrophils x    ; Lymphocytes x    ; Eosinophils x    ; Bands%: x    ; Blasts x                                    141    |  107    |  20                  Calcium: 9.0   / iCa: x      (07-30 @ 21:28)    ----------------------------<  139       Magnesium: 2.2                              4.3     |  22     |  1.23             Phosphorous: x          EKG:  Telemetry:    A/P: 89M Italian speaking Hx HTN, PAF (Eliquis, last dose 7/27), CHB s/p dual chamber PPM ([employer], implantation 2/2017), CAD s/p PCI, mixed restrictive and obstructive pulmonary disease, SESAR (not on CPAP), [place]; admit 7/30 with c/o fatigue/NELSON, generator is [facility] s/p CRT upgrade c/b hypotensive post implant requiring Mauyr/PRBC x 1, s/p CTA Chest revealing     Neuro    Pulm    CV    GI    Renal    Heme    Endo    ID CICU Accept Note    Transfer from: [facility]    Accepting physician: Dr. Esparza    HPI: 89 year old male, ([place]ican speaking, daughter at bedside, unable to hear well with , used daughter as ), with PMHx of, HTN, CAD s/p PCI w/HAYDER, mixed restrictive and obstructive pulmonary disease, SESAR (not on CPAP), [place],  PAF on Eliquis ( on hold since saturday), CHB s/p dual chamber( [employer]) PPM implantation on 2/21/2017, who has developed progressive fatigue and dyspnea with ADL referred  for possible CRT upgrade given his generator is at [facility].    ECHO(12/29/2023): LVEF 27%, severely decreased systolic function. Abnormal (paradoxical) septal motion consistent with RV pacemaker. Mild (grade I) LV diastolic dysfunction. The apex is dyskinetic. The apical septal segment and apical lateral segment are akinetic. The entire anterior wall, entire inferior wall, basal and mid anterolateral wall, basal and mid anterior septum, basal and mid inferior septum, and basal and mid inferolateral wall are hypokinetic. LA mildly dilated (JB 21.98). Mild AR/TR. Moderate MR. Mild pHTN.    NUCLEAR STRESS TEST(12/29/2023): Large sized moderate to severe, fixed defect in the inferoapical, apical lateral walls consistent with infarct. There is a medium size moderate, fixed defect in the inferior wall consistent with infarct. There is a small sized, mild to moderate fixed defect in the mid to basal anteroseptum consistent with infarct. No evidence of ischemia.    7/30 - Hypotensive post CRT upgrade implantation, noted with swelling at site s/p Mayur bolus, PRBC x 1 - s/p CTA chest revealing:  Transfer to CICU for close monitoring     Vital Signs Last 24 Hrs  T(C): 36.9 (30 Jul 2024 20:50), Max: 36.9 (30 Jul 2024 20:50)  T(F): 98.4 (30 Jul 2024 20:50), Max: 98.4 (30 Jul 2024 20:50)  HR: 65 (30 Jul 2024 20:50) (56 - 74)  BP: 71/34 (30 Jul 2024 20:50) (71/34 - 139/77)  BP(mean): 47 (30 Jul 2024 20:50) (39 - 99)  RR: 17 (30 Jul 2024 20:50) (16 - 20)  SpO2: 96% (30 Jul 2024 20:50) (92% - 100%)    Parameters below as of 30 Jul 2024 20:50  Patient On (Oxygen Delivery Method): room air      I&O's Summary    30 Jul 2024 07:01  -  30 Jul 2024 22:00  --------------------------------------------------------  IN: 0 mL / OUT: 0 mL / NET: 0 mL      Allergies: NSAIDS, Shellfish    MEDICATIONS  (STANDING):  albuterol/ipratropium for Nebulization 3 milliLiter(s) Nebulizer every 6 hours  atorvastatin 10 milliGRAM(s) Oral at bedtime  budesonide  80 MICROgram(s)/formoterol 4.5 MICROgram(s) Inhaler 2 Puff(s) Inhalation two times a day  ceFAZolin   IVPB 2000 milliGRAM(s) IV Intermittent once  chlorhexidine 4% Liquid 1 Application(s) Topical once  clopidogrel Tablet 75 milliGRAM(s) Oral daily  finasteride 5 milliGRAM(s) Oral daily  tamsulosin 0.8 milliGRAM(s) Oral at bedtime  tiotropium 2.5 MICROgram(s) Inhaler 2 Puff(s) Inhalation daily    LABS                                            11.9                  Neurophils% (auto):   x      (07-30 @ 21:28):    8.95 )-----------(166          Lymphocytes% (auto):  x                                             36.9                   Eosinphils% (auto):   x        Manual%: Neutrophils x    ; Lymphocytes x    ; Eosinophils x    ; Bands%: x    ; Blasts x                                    141    |  107    |  20                  Calcium: 9.0   / iCa: x      (07-30 @ 21:28)    ----------------------------<  139       Magnesium: 2.2                              4.3     |  22     |  1.23             Phosphorous: x          EKG:  Telemetry:    A/P: 89M Mosotho speaking Hx HTN, PAF (Eliquis, last dose 7/27), CHB s/p dual chamber PPM ([employer], implantation 2/2017), CAD s/p PCI, mixed restrictive and obstructive pulmonary disease, SESAR (not on CPAP), [place]; admit 7/30 with c/o fatigue/NELSON, generator is [facility] s/p CRT upgrade c/b hypotensive post implant requiring Mayur/PRBC x 1, s/p CTA Chest revealing     Neuro    Pulm    CV    GI    Renal    Heme    Endo    ID    ======================= DISPOSITION  =====================  [X] Critical   [ ] Guarded    [ ] Stable    [X] Maintain in CICU  [ ] Downgrade to Telemetry  [ ] Discharge Home    Patient requires continuous monitoring with bedside rhythm monitoring, pulse ox monitoring, and intermittent blood gas analysis. Care plan discussed with ICU care team. Patient remained critical and at risk for life threatening decompensation.  Patient seen, examined and plan discussed with CCU team during rounds.     I have personally provided 30 minutes of critical care time excluding time spent on separate procedures, in addition to initial critical care time provided by the CICU Attending, Dr. [facility]as [phone] CICU Accept Note    Transfer from: [facility]    Accepting physician: Dr. Esparza    HPI: 89 year old male, (Lebanese speaking, daughter at bedside, unable to hear well with , used daughter as ), with PMHx of, HTN, CAD s/p PCI w/HAYDER, mixed restrictive and obstructive pulmonary disease, SESAR (not on CPAP), [place],  PAF on Eliquis ( on hold since saturday), CHB s/p dual chamber( [employer]) PPM implantation on 2/21/2017, who has developed progressive fatigue and dyspnea with ADL referred  for possible CRT upgrade given his generator is at [facility].    ECHO(12/29/2023): LVEF 27%, severely decreased systolic function. Abnormal (paradoxical) septal motion consistent with RV pacemaker. Mild (grade I) LV diastolic dysfunction. The apex is dyskinetic. The apical septal segment and apical lateral segment are akinetic. The entire anterior wall, entire inferior wall, basal and mid anterolateral wall, basal and mid anterior septum, basal and mid inferior septum, and basal and mid inferolateral wall are hypokinetic. LA mildly dilated (JB 21.98). Mild AR/TR. Moderate MR. Mild pHTN.    NUCLEAR STRESS TEST(12/29/2023): Large sized moderate to severe, fixed defect in the inferoapical, apical lateral walls consistent with infarct. There is a medium size moderate, fixed defect in the inferior wall consistent with infarct. There is a small sized, mild to moderate fixed defect in the mid to basal anteroseptum consistent with infarct. No evidence of ischemia.    7/30 - Hypotensive post CRT upgrade implantation, noted with swelling at site s/p Mayur bolus s/p CTA chest revealing:  Transfer to CICU for close monitoring     Vital Signs Last 24 Hrs  T(C): 36.9 (30 Jul 2024 20:50), Max: 36.9 (30 Jul 2024 20:50)  T(F): 98.4 (30 Jul 2024 20:50), Max: 98.4 (30 Jul 2024 20:50)  HR: 65 (30 Jul 2024 20:50) (56 - 74)  BP: 71/34 (30 Jul 2024 20:50) (71/34 - 139/77)  BP(mean): 47 (30 Jul 2024 20:50) (39 - 99)  RR: 17 (30 Jul 2024 20:50) (16 - 20)  SpO2: 96% (30 Jul 2024 20:50) (92% - 100%)    Parameters below as of 30 Jul 2024 20:50  Patient On (Oxygen Delivery Method): room air      I&O's Summary    30 Jul 2024 07:01  -  30 Jul 2024 22:00  --------------------------------------------------------  IN: 0 mL / OUT: 0 mL / NET: 0 mL      Allergies: NSAIDS, Shellfish    MEDICATIONS  (STANDING):  albuterol/ipratropium for Nebulization 3 milliLiter(s) Nebulizer every 6 hours  atorvastatin 10 milliGRAM(s) Oral at bedtime  budesonide  80 MICROgram(s)/formoterol 4.5 MICROgram(s) Inhaler 2 Puff(s) Inhalation two times a day  ceFAZolin   IVPB 2000 milliGRAM(s) IV Intermittent once  chlorhexidine 4% Liquid 1 Application(s) Topical once  clopidogrel Tablet 75 milliGRAM(s) Oral daily  finasteride 5 milliGRAM(s) Oral daily  tamsulosin 0.8 milliGRAM(s) Oral at bedtime  tiotropium 2.5 MICROgram(s) Inhaler 2 Puff(s) Inhalation daily    LABS                                            11.9                  Neurophils% (auto):   x      (07-30 @ 21:28):    8.95 )-----------(166          Lymphocytes% (auto):  x                                             36.9                   Eosinphils% (auto):   x        Manual%: Neutrophils x    ; Lymphocytes x    ; Eosinophils x    ; Bands%: x    ; Blasts x                                    141    |  107    |  20                  Calcium: 9.0   / iCa: x      (07-30 @ 21:28)    ----------------------------<  139       Magnesium: 2.2                              4.3     |  22     |  1.23             Phosphorous: x          EKG:  Telemetry:    A/P: 89M Rwandan speaking Hx HTN, PAF (Eliquis, last dose 7/27), CHB s/p dual chamber PPM ([employer], implantation 2/2017), CAD s/p PCI, mixed restrictive and obstructive pulmonary disease, SESAR (not on CPAP), [place]; admit 7/30 with c/o fatigue/NELSON, generator is [facility] s/p CRT upgrade c/b hypotensive post implant requiring Mayur/PRBC x 1, s/p CTA Chest revealing     Neuro    Pulm    CV    GI    Renal    Heme    Endo    ID    ======================= DISPOSITION  =====================  [X] Critical   [ ] Guarded    [ ] Stable    [X] Maintain in CICU  [ ] Downgrade to Telemetry  [ ] Discharge Home    Patient requires continuous monitoring with bedside rhythm monitoring, pulse ox monitoring, and intermittent blood gas analysis. Care plan discussed with ICU care team. Patient remained critical and at risk for life threatening decompensation.  Patient seen, examined and plan discussed with CCU team during rounds.     I have personally provided 30 minutes of critical care time excluding time spent on separate procedures, in addition to initial critical care time provided by the CICU Attending, Dr. Isaias Venegas [facility]  CICU [id number] CICU Accept Note    Transfer from: [facility]    Accepting physician: Dr. Esparza    HPI: 89 year old male, (Guatemalan speaking, daughter at bedside, unable to hear well with , used daughter as ), with PMHx of, HTN, CAD s/p PCI w/HAYDER, mixed restrictive and obstructive pulmonary disease, SESAR (not on CPAP), [place],  PAF on Eliquis ( on hold since saturday), CHB s/p dual chamber( [employer]) PPM implantation on 2/21/2017, who has developed progressive fatigue and dyspnea with ADL referred  for possible CRT upgrade given his generator is at [facility].    ECHO(12/29/2023): LVEF 27%, severely decreased systolic function. Abnormal (paradoxical) septal motion consistent with RV pacemaker. Mild (grade I) LV diastolic dysfunction. The apex is dyskinetic. The apical septal segment and apical lateral segment are akinetic. The entire anterior wall, entire inferior wall, basal and mid anterolateral wall, basal and mid anterior septum, basal and mid inferior septum, and basal and mid inferolateral wall are hypokinetic. LA mildly dilated (JB 21.98). Mild AR/TR. Moderate MR. Mild pHTN.    NUCLEAR STRESS TEST(12/29/2023): Large sized moderate to severe, fixed defect in the inferoapical, apical lateral walls consistent with infarct. There is a medium size moderate, fixed defect in the inferior wall consistent with infarct. There is a small sized, mild to moderate fixed defect in the mid to basal anteroseptum consistent with infarct. No evidence of ischemia.    7/30 - Hypotensive post CRT upgrade implantation, noted with swelling at site s/p Mayur bolus, transfer to CICU for close monitoring.  On arrival HD stable, SBP 110s.  L anterior chestwall CRT site slight swelling, compression dsg intact, H/H stable.      Vital Signs Last 24 Hrs  T(C): 36.9 (30 Jul 2024 20:50), Max: 36.9 (30 Jul 2024 20:50)  T(F): 98.4 (30 Jul 2024 20:50), Max: 98.4 (30 Jul 2024 20:50)  HR: 65 (30 Jul 2024 20:50) (56 - 74)  BP: 71/34 (30 Jul 2024 20:50) (71/34 - 139/77)  BP(mean): 47 (30 Jul 2024 20:50) (39 - 99)  RR: 17 (30 Jul 2024 20:50) (16 - 20)  SpO2: 96% (30 Jul 2024 20:50) (92% - 100%)    Parameters below as of 30 Jul 2024 20:50  Patient On (Oxygen Delivery Method): room air      I&O's Summary    30 Jul 2024 07:01  -  30 Jul 2024 22:00  --------------------------------------------------------  IN: 0 mL / OUT: 0 mL / NET: 0 mL      Allergies: NSAIDS, Shellfish    MEDICATIONS  (STANDING):  albuterol/ipratropium for Nebulization 3 milliLiter(s) Nebulizer every 6 hours  atorvastatin 10 milliGRAM(s) Oral at bedtime  budesonide  80 MICROgram(s)/formoterol 4.5 MICROgram(s) Inhaler 2 Puff(s) Inhalation two times a day  ceFAZolin   IVPB 2000 milliGRAM(s) IV Intermittent once  chlorhexidine 4% Liquid 1 Application(s) Topical once  clopidogrel Tablet 75 milliGRAM(s) Oral daily  finasteride 5 milliGRAM(s) Oral daily  tamsulosin 0.8 milliGRAM(s) Oral at bedtime  tiotropium 2.5 MICROgram(s) Inhaler 2 Puff(s) Inhalation daily    LABS                                            11.9                  Neurophils% (auto):   x      (07-30 @ 21:28):    8.95 )-----------(166          Lymphocytes% (auto):  x                                             36.9                   Eosinphils% (auto):   x        Manual%: Neutrophils x    ; Lymphocytes x    ; Eosinophils x    ; Bands%: x    ; Blasts x                                    141    |  107    |  20                  Calcium: 9.0   / iCa: x      (07-30 @ 21:28)    ----------------------------<  139       Magnesium: 2.2                              4.3     |  22     |  1.23             Phosphorous: x          EKG:  Telemetry:    A/P: 89M Ivorian speaking Hx HTN, PAF (Eliquis, last dose 7/27), CHB s/p dual chamber PPM ([employer], implantation 2/2017), CAD s/p PCI, mixed restrictive and obstructive pulmonary disease, SESAR (not on CPAP), [place]; admit 7/30 with c/o fatigue/NELSON, generator is [facility] s/p CRT upgrade c/b brief hypotensive post procedure s/p Mayur bolus, transfer to CICU for close monitoring.     Neuro    Pulm    CV    GI    Renal    Heme    Endo    ID    ======================= DISPOSITION  =====================  [X] Critical   [ ] Guarded    [ ] Stable    [X] Maintain in CICU  [ ] Downgrade to Telemetry  [ ] Discharge Home    Patient requires continuous monitoring with bedside rhythm monitoring, pulse ox monitoring, and intermittent blood gas analysis. Care plan discussed with ICU care team. Patient remained critical and at risk for life threatening decompensation.  Patient seen, examined and plan discussed with CCU team during rounds.     I have personally provided 30 minutes of critical care time excluding time spent on separate procedures, in addition to initial critical care time provided by the CICU Attending, Dr. Isaias Venegas [facility]  CICU [phone] CICU Accept Note    Transfer from: [facility]    Accepting physician: Dr. Esparza    HPI: 89 year old male, ([place] speaking, daughter at bedside, unable to hear well with , used daughter as ), with PMHx of, HTN, CAD s/p PCI w/HAYDER, mixed restrictive and obstructive pulmonary disease, SESAR (not on CPAP), [place],  PAF on Eliquis ( on hold since saturday), CHB s/p dual chamber( [employer]) PPM implantation on 2/21/2017, who has developed progressive fatigue and dyspnea with ADL referred  for possible CRT upgrade given his generator is at [facility].    ECHO(12/29/2023): LVEF 27%, severely decreased systolic function. Abnormal (paradoxical) septal motion consistent with RV pacemaker. Mild (grade I) LV diastolic dysfunction. The apex is dyskinetic. The apical septal segment and apical lateral segment are akinetic. The entire anterior wall, entire inferior wall, basal and mid anterolateral wall, basal and mid anterior septum, basal and mid inferior septum, and basal and mid inferolateral wall are hypokinetic. LA mildly dilated (JB 21.98). Mild AR/TR. Moderate MR. Mild pHTN.    NUCLEAR STRESS TEST(12/29/2023): Large sized moderate to severe, fixed defect in the inferoapical, apical lateral walls consistent with infarct. There is a medium size moderate, fixed defect in the inferior wall consistent with infarct. There is a small sized, mild to moderate fixed defect in the mid to basal anteroseptum consistent with infarct. No evidence of ischemia.    7/30 - Hypotensive post CRT upgrade implantation, noted with swelling at site s/p Mayur bolus, transfer to CICU for close monitoring.  POCUS: no pericardal effusion.  On arrival to CICU patient HD stable, SBP 110s.  L anterior chestwall CRT site slight swelling, compression dsg intact, H/H stable.      Vital Signs Last 24 Hrs  T(C): 36.9 (30 Jul 2024 20:50), Max: 36.9 (30 Jul 2024 20:50)  T(F): 98.4 (30 Jul 2024 20:50), Max: 98.4 (30 Jul 2024 20:50)  HR: 65 (30 Jul 2024 20:50) (56 - 74)  BP: 71/34 (30 Jul 2024 20:50) (71/34 - 139/77)  BP(mean): 47 (30 Jul 2024 20:50) (39 - 99)  RR: 17 (30 Jul 2024 20:50) (16 - 20)  SpO2: 96% (30 Jul 2024 20:50) (92% - 100%)    Parameters below as of 30 Jul 2024 20:50  Patient On (Oxygen Delivery Method): room air      I&O's Summary    30 Jul 2024 07:01  -  30 Jul 2024 22:00  --------------------------------------------------------  IN: 0 mL / OUT: 0 mL / NET: 0 mL      Allergies: NSAIDS, Shellfish    MEDICATIONS  (STANDING):  albuterol/ipratropium for Nebulization 3 milliLiter(s) Nebulizer every 6 hours  atorvastatin 10 milliGRAM(s) Oral at bedtime  budesonide  80 MICROgram(s)/formoterol 4.5 MICROgram(s) Inhaler 2 Puff(s) Inhalation two times a day  ceFAZolin   IVPB 2000 milliGRAM(s) IV Intermittent once  chlorhexidine 4% Liquid 1 Application(s) Topical once  clopidogrel Tablet 75 milliGRAM(s) Oral daily  finasteride 5 milliGRAM(s) Oral daily  tamsulosin 0.8 milliGRAM(s) Oral at bedtime  tiotropium 2.5 MICROgram(s) Inhaler 2 Puff(s) Inhalation daily    LABS                                            11.9                  Neurophils% (auto):   x      (07-30 @ 21:28):    8.95 )-----------(166          Lymphocytes% (auto):  x                                             36.9                   Eosinphils% (auto):   x        Manual%: Neutrophils x    ; Lymphocytes x    ; Eosinophils x    ; Bands%: x    ; Blasts x                                    141    |  107    |  20                  Calcium: 9.0   / iCa: x      (07-30 @ 21:28)    ----------------------------<  139       Magnesium: 2.2                              4.3     |  22     |  1.23             Phosphorous: x          EKG:  Telemetry:    A/P: 89M Luxembourgish speaking Hx HTN, PAF (Eliquis, last dose 7/27), CHB s/p dual chamber PPM ([employer], implantation 2/2017), CAD s/p PCI, mixed restrictive and obstructive pulmonary disease, SESAR (not on CPAP), [place]; admit 7/30 with c/o fatigue/NELSON, generator is [facility] s/p CRT upgrade c/b brief hypotensive post procedure s/p Mayur bolus, transfer to CICU for close monitoring.     Neuro  - No active issues; AO3    Pulm  #Hx SESAR; mixed restrictive and obstructive pulm disease  - c/w home Spiriva, Symbicort, c/w Duonebs   - Sp02 > 94% on RA  - Nocturnal CPAP      CV  #[facility] s/p CRT upgrade; CHB s/p dual chamber PPM ([employer], implantation 2/2017)  - s/p CRT upgrade c/b brief hypotension post procedure likely sedation related   - Mild swelling at CRT site, compression dsg intact/dry, H/H stable   - PA/Lat CXR in AM  - DC planning    #Hx Chronic Systolic HF, EF 27%  - TTE 12/2023: severely decreased LVSF (EF 27%), WMAs, Grade 1 DD, normal RVF  - restart GDMT: Toprol 50 XL. Entreso 24/26, Nawaf 25  - Euvolemic, target net even, strict I/Os, daily weights    #Hx PAF  - Biv pacing, c/w Toprol   - f/u EP recs: restarting Eliquis     #Hx CAD  - c/w Plavix, Lipitor     GI  - DASH diet    Renal  - No active issues, SCr 1.27 - euvolemic, target even   - strict I/Os    Heme  - H/H stable, active T&S  - DVT ppx: SCDs, resume DOAC per EP    Endo  - A1c, TFTs    ID  #Leukocytosis, mild   - Possible reactive, afebrile - monitor off Abx  ======================= DISPOSITION  =====================  [X] Critical   [ ] Guarded    [ ] Stable    [X] Maintain in CICU  [ ] Downgrade to Telemetry  [ ] Discharge Home    Patient requires continuous monitoring with bedside rhythm monitoring, pulse ox monitoring, and intermittent blood gas analysis. Care plan discussed with ICU care team. Patient remained critical and at risk for life threatening decompensation.  Patient seen, examined and plan discussed with CCU team during rounds.     I have personally provided 75 minutes of critical care time excluding time spent on separate procedures, in addition to initial critical care time provided by the CICU Attending, Dr. Isaias Venegas [facility]  CICU [phone] CICU Accept Note    Transfer from: [facility]    Accepting physician: Dr. Esparza    HPI: 89 year old male, (Micronesian speaking, daughter at bedside, unable to hear well with , used daughter as ), with PMHx of, HTN, CAD s/p PCI w/HAYDER, mixed restrictive and obstructive pulmonary disease, SESAR (not on CPAP), [place],  PAF on Eliquis ( on hold since saturday), CHB s/p dual chamber( [employer]) PPM implantation on 2/21/2017, who has developed progressive fatigue and dyspnea with ADL referred  for possible CRT upgrade given his generator is at [facility].    ECHO(12/29/2023): LVEF 27%, severely decreased systolic function. Abnormal (paradoxical) septal motion consistent with RV pacemaker. Mild (grade I) LV diastolic dysfunction. The apex is dyskinetic. The apical septal segment and apical lateral segment are akinetic. The entire anterior wall, entire inferior wall, basal and mid anterolateral wall, basal and mid anterior septum, basal and mid inferior septum, and basal and mid inferolateral wall are hypokinetic. LA mildly dilated (JB 21.98). Mild AR/TR. Moderate MR. Mild pHTN.    NUCLEAR STRESS TEST(12/29/2023): Large sized moderate to severe, fixed defect in the inferoapical, apical lateral walls consistent with infarct. There is a medium size moderate, fixed defect in the inferior wall consistent with infarct. There is a small sized, mild to moderate fixed defect in the mid to basal anteroseptum consistent with infarct. No evidence of ischemia.    7/30 - Hypotensive post CRT upgrade implantation, noted with swelling at site s/p Mayur bolus, transfer to CICU for close monitoring.  POCUS: no pericardal effusion.  On arrival to CICU patient HD stable, SBP 110s.  L anterior chestwall CRT site slight swelling, compression dsg intact, H/H stable.      Vital Signs Last 24 Hrs  T(C): 36.9 (30 Jul 2024 20:50), Max: 36.9 (30 Jul 2024 20:50)  T(F): 98.4 (30 Jul 2024 20:50), Max: 98.4 (30 Jul 2024 20:50)  HR: 65 (30 Jul 2024 20:50) (56 - 74)  BP: 71/34 (30 Jul 2024 20:50) (71/34 - 139/77)  BP(mean): 47 (30 Jul 2024 20:50) (39 - 99)  RR: 17 (30 Jul 2024 20:50) (16 - 20)  SpO2: 96% (30 Jul 2024 20:50) (92% - 100%)    Parameters below as of 30 Jul 2024 20:50  Patient On (Oxygen Delivery Method): room air      I&O's Summary    30 Jul 2024 07:01  -  30 Jul 2024 22:00  --------------------------------------------------------  IN: 0 mL / OUT: 0 mL / NET: 0 mL      Allergies: NSAIDS, Shellfish    MEDICATIONS  (STANDING):  albuterol/ipratropium for Nebulization 3 milliLiter(s) Nebulizer every 6 hours  atorvastatin 10 milliGRAM(s) Oral at bedtime  budesonide  80 MICROgram(s)/formoterol 4.5 MICROgram(s) Inhaler 2 Puff(s) Inhalation two times a day  ceFAZolin   IVPB 2000 milliGRAM(s) IV Intermittent once  chlorhexidine 4% Liquid 1 Application(s) Topical once  clopidogrel Tablet 75 milliGRAM(s) Oral daily  finasteride 5 milliGRAM(s) Oral daily  tamsulosin 0.8 milliGRAM(s) Oral at bedtime  tiotropium 2.5 MICROgram(s) Inhaler 2 Puff(s) Inhalation daily    LABS                                            11.9                  Neurophils% (auto):   x      (07-30 @ 21:28):    8.95 )-----------(166          Lymphocytes% (auto):  x                                             36.9                   Eosinphils% (auto):   x        Manual%: Neutrophils x    ; Lymphocytes x    ; Eosinophils x    ; Bands%: x    ; Blasts x                                    141    |  107    |  20                  Calcium: 9.0   / iCa: x      (07-30 @ 21:28)    ----------------------------<  139       Magnesium: 2.2                              4.3     |  22     |  1.23             Phosphorous: x          EKG:  Telemetry:    A/P: 89M Turkmen speaking Hx HTN, PAF (Eliquis, last dose 7/27), CHB s/p dual chamber PPM ([employer], implantation 2/2017), CAD s/p PCI, mixed restrictive and obstructive pulmonary disease, SESAR (not on CPAP), [place]; admit 7/30 with c/o fatigue/NELSON, generator is [facility] s/p CRT upgrade c/b brief hypotensive post procedure s/p Mayur bolus, transfer to CICU for close monitoring.     Neuro  - No active issues; AO3    Pulm  #Hx SESAR; mixed restrictive and obstructive pulm disease  - c/w home Spiriva, Symbicort, c/w Duonebs   - Sp02 > 94% on RA  - Nocturnal CPAP      CV  #[facility] s/p CRT upgrade; CHB s/p dual chamber PPM ([employer], implantation 2/2017)  - s/p CRT upgrade c/b brief hypotension post procedure likely sedation related   - Mild swelling at CRT site, compression dsg intact/dry, H/H stable   - POCUS: no pericardial effusion   - PA/Lat CXR in AM  - DC planning    #Hx Chronic Systolic HF, EF 27%  - TTE 12/2023: severely decreased LVSF (EF 27%), WMAs, Grade 1 DD, normal RVF  - restart GDMT: Toprol 50 XL. Entreso 24/26, Lamar 25  - Euvolemic, target net even, strict I/Os, daily weights    #Hx PAF  - Biv pacing, c/w Toprol   - f/u EP recs: restarting Eliquis     #Hx CAD  - c/w Plavix, Lipitor     GI  - DASH diet    Renal  - No active issues, SCr 1.27 - euvolemic, target even   - strict I/Os    Heme  - H/H stable, active T&S  - DVT ppx: SCDs, resume DOAC per EP    Endo  - A1c, TFTs    ID  #Leukocytosis, mild   - Possible reactive, afebrile - monitor off Abx  ======================= DISPOSITION  =====================  [X] Critical   [ ] Guarded    [ ] Stable    [X] Maintain in CICU  [ ] Downgrade to Telemetry  [ ] Discharge Home    Patient requires continuous monitoring with bedside rhythm monitoring, pulse ox monitoring, and intermittent blood gas analysis. Care plan discussed with ICU care team. Patient remained critical and at risk for life threatening decompensation.  Patient seen, examined and plan discussed with CCU team during rounds.     I have personally provided 75 minutes of critical care time excluding time spent on separate procedures, in addition to initial critical care time provided by the CICU Attending, Dr. Isaias Venegas [facility]  CICU [phone]

## 2024-07-30 NOTE — PATIENT PROFILE ADULT - FALL HARM RISK - HARM RISK INTERVENTIONS
Assistance with ambulation/Assistance OOB with selected safe patient handling equipment/Communicate Risk of Fall with Harm to all staff/Discuss with provider need for PT consult/Monitor gait and stability/Provide patient with walking aids - walker, cane, crutches/Reinforce activity limits and safety measures with patient and family/Sit up slowly, dangle for a short time, stand at bedside before walking/Tailored Fall Risk Interventions/Use of alarms - bed, chair and/or voice tab/Visual Cue: Yellow wristband and red socks/Bed in lowest position, wheels locked, appropriate side rails in place/Call bell, personal items and telephone in reach/Instruct patient to call for assistance before getting out of bed or chair/Non-slip footwear when patient is out of bed/[place] to call system/Physically safe environment - no spills, clutter or unnecessary equipment/Purposeful Proactive Rounding/Room/bathroom lighting operational, light cord in reach

## 2024-07-30 NOTE — CHART NOTE - NSCHARTNOTEFT_GEN_A_CORE
2100     Called to bedside due to patient's blood pressure being 75/40. Pt asymptomatic without any complaints besides headache. His Vitals were otherwise stable besides the blood pressure. On Physical Exam he had no muffled heart sounds, resp distress, or distended nec veins. His extremities were warm to touch. 250 cc bolus given with response in his blood pressure but only transiently. Another 250 cc bolus given and Dr. Koroma made aware. A bedside echo showed no effusion. Pt continued to drop his blood pressure into the 60s without symptoms ; which was confirmed w a manual BP. Fellow called to alvin and Mayur given x 2. At this point L ant chest wall noted to have an expanding hematoma for which two pocket presses were applied. Franci called and pt transferred to CICU for further hemodynamic montoring.

## 2024-07-30 NOTE — PACU DISCHARGE NOTE - PAIN:
[FreeTextEntry1] : He is doing better at school on methylphenidate. List of resources for ADHD provided to mother. Labs were requested. Anticipate continuation of present dose of methylphenidate. 
Controlled with current regime
Controlled with current regime

## 2024-07-30 NOTE — H&P CARDIOLOGY - NSICDXPASTSURGICALHX_GEN_ALL_CORE_FT
PAST SURGICAL HISTORY:  3-vessel coronary artery disease     H/O bilateral hip replacements     H/O carpal tunnel repair     History of appendectomy     History of right cataract surgery bilat cataract sx    S/P drug eluting coronary stent placement

## 2024-07-30 NOTE — CHART NOTE - NSCHARTNOTEFT_GEN_A_CORE
Patient arrived to PACU blood pressure 86/44, anesthesia administered ephedrine- blood pressure response improved to 100s-110s systolic- patient asymptomatic  all new heart failure meds on hold till tomorrow 7/31, home furosemide replaced by spironolactone, metoprolol and Entresto- Eliquis restarting tomorrow 7/31  to be determined by Dr. Koroma in AM after wound evaluated- above discussed with Dr. Koroma Patient arrived to PACU blood pressure 86/44, anesthesia administered ephedrine- blood pressure response improved to 100s-110s systolic- patient asymptomatic  all new heart failure meds on hold till tomorrow 7/31, home furosemide replaced by spironolactone, metoprolol and Entresto- timing of restarting Eliquis will be determined by Dr. Koroma in AM 7/31after wound evaluated  above discussed with Dr. Koroma Patient arrived to PACU blood pressure 86/44, anesthesia administered ephedrine- blood pressure response improved to 100s-110s systolic- patient asymptomatic- wound and dressing soft, clean and dry- no hematoma noted- all new heart failure meds on hold till tomorrow 7/31- home furosemide is now replaced by spironolactone, metoprolol and Entresto as per Dr. Koroma sign out instructions- timing of restarting Eliquis will be determined by Dr. Koroma in AM 7/31after wound evaluated- above discussed with Dr. Krooma Patient arrived to PACU blood pressure 86/44, anesthesia administered ephedrine- blood pressure response improved to 100s-110s systolic- patient asymptomatic- wound and dressing soft, clean and dry- no hematoma noted- all new heart failure meds on hold till tomorrow 7/31- home furosemide is now replaced by spironolactone, metoprolol and Entresto as per Dr. Koroma sign out instructions- timing of restarting Eliquis will be determined by Dr. Koroma in AM 7/31 after wound evaluated- above discussed with Dr. Koroma Patient arrived to PACU @18:05 w/ blood pressure of  86/44, anesthesia administered ephedrine- blood pressure response now improved to 100s-110s systolic- patient asymptomatic- wound and dressing soft, clean and dry- no hematoma noted- will continue to monitor- all new heart failure meds on hold till tomorrow 7/31- home furosemide is now replaced by spironolactone, metoprolol and Entresto as per Dr. Koroma sign out instructions- timing of restarting Eliquis will be determined by Dr. Koroma in AM 7/31 after wound evaluated- above discussed with Dr. Koroma Patient arrived to PACU @18:05 w/ blood pressure of 86/44, anesthesia administered ephedrine- blood pressure response now improved to 100s-110s systolic- patient asymptomatic- wound and dressing soft, clean and dry- no hematoma noted- will continue to monitor- all new heart failure meds on hold till tomorrow 7/31- home furosemide is now replaced by spironolactone, metoprolol and Entresto as per Dr. Koroma sign out instructions- timing of restarting Eliquis will be determined by Dr. Koroma in AM 7/31 after wound evaluated- above discussed with Dr. Koroma Patient is s/p CRT-P upgrade ([facility])- he arrived to PACU @18:05 w/ blood pressure of 86/44, anesthesia administered ephedrine- blood pressure response now improved to 100s-110s systolic- patient asymptomatic- wound and dressing soft, clean and dry- no hematoma noted- will continue to monitor- all new heart failure meds on hold till tomorrow 7/31- home furosemide is now replaced by spironolactone, metoprolol and Entresto as per Dr. Koroma sign out instructions- timing of restarting Eliquis will be determined by Dr. Koroma in AM 7/31 after wound evaluated- above discussed with Dr. Koroma Patient is s/p CRT-P upgrade ([facility])- he arrived to PACU @18:05 w/ blood pressure of 86/44, anesthesia administered ephedrine- blood pressure response now improved to 100s-110s systolic- patient asymptomatic- lung and heart physical exam is unremarkable, wound w/ dressing soft, clean and dry- no hematoma noted- will continue to monitor- all new heart failure meds on hold till tomorrow 7/31- home furosemide is now replaced by spironolactone, metoprolol and Entresto as per Dr. Koroma sign out instructions- timing of restarting Eliquis will be determined by Dr. Koroma in AM 7/31 after wound evaluated- above discussed with Dr. Koroma

## 2024-07-30 NOTE — H&P CARDIOLOGY - NSALCOHOLUSECOMMENT_GEN_ALL_CORE_FT
Pt given ice pack for left shoulder at this time. No distress noted. Pt denies needs at this time. Call light within reach.       [address], RN  12/14/17 [id number] social

## 2024-07-30 NOTE — H&P CARDIOLOGY - NSICDXPASTMEDICALHX_GEN_ALL_CORE_FT
PAST MEDICAL HISTORY:  BPH (benign prostatic hyperplasia)     Coronary artery disease of native artery of native heart with stable angina pectoris     HLD (hyperlipidemia)     Hypertension     Pacemaker [employer]

## 2024-07-30 NOTE — H&P CARDIOLOGY - HISTORY OF PRESENT ILLNESS
89 year old man with persistent AF and complete heart block and a [place] [employer] dual chamber pacemaker who has developed progressive fatigue and dyspnea with ADL referred to me for possible CRT upgrade given his generator is at [facility].    He has a h/o HTN, CAD s/p PCI w/HAYDER, mixed restrictive and obstructive pulmonary disease, SESAR (not on CPAP), PAF, CHB s/p dual chamber PPM implantation on 2/21/2017.    ECHO(12/29/2023): LVEF 27%, severely decreased systolic function. Abnormal (paradoxical) septal motion consistent with RV pacemaker. Mild (grade I) LV diastolic dysfunction. The apex is dyskinetic. The apical septal segment and apical lateral segment are akinetic. The entire anterior wall, entire inferior wall, basal and mid anterolateral wall, basal and mid anterior septum, basal and mid inferior septum, and basal and mid inferolateral wall are hypokinetic. LA mildly dilated (JB 21.98). Mild AR/TR. Moderate MR. Mild pHTN.  NUCLEAR STRESS TEST(12/29/2023): Large sized moderate to severe, fixed defect in the inferoapical, apical lateral walls consistent with infarct. There is a medium size moderate, fixed defect in the inferior wall consistent with infarct. There is a small sized, mild to moderate fixed defect in the mid to basal anteroseptum consistent with infarct. No evidence of ischemia. 89 year old male with PMHx of, HTN, CAD s/p PCI w/HAYDER, mixed restrictive and obstructive pulmonary disease, SESAR (not on CPAP), PAF, CHB s/p dual chamber( [employer]) PPM implantation on 2/21/2017, who has developed progressive fatigue and dyspnea with ADL referred  for possible CRT upgrade given his generator is at [facility].  ECHO(12/29/2023): LVEF 27%, severely decreased systolic function. Abnormal (paradoxical) septal motion consistent with RV pacemaker. Mild (grade I) LV diastolic dysfunction. The apex is dyskinetic. The apical septal segment and apical lateral segment are akinetic. The entire anterior wall, entire inferior wall, basal and mid anterolateral wall, basal and mid anterior septum, basal and mid inferior septum, and basal and mid inferolateral wall are hypokinetic. LA mildly dilated (JB 21.98). Mild AR/TR. Moderate MR. Mild pHTN.  NUCLEAR STRESS TEST(12/29/2023): Large sized moderate to severe, fixed defect in the inferoapical, apical lateral walls consistent with infarct. There is a medium size moderate, fixed defect in the inferior wall consistent with infarct. There is a small sized, mild to moderate fixed defect in the mid to basal anteroseptum consistent with infarct. No evidence of ischemia. 89 year old male, (Sierra Leonean speaking, daughter at bedside, unable to hear well with , used daughter as ), with PMHx of, HTN, CAD s/p PCI w/HAYDER, mixed restrictive and obstructive pulmonary disease, SESAR (not on CPAP), [place],  PAF on Eliquis ( on hold since saturday), CHB s/p dual chamber( [employer]) PPM implantation on 2/21/2017, who has developed progressive fatigue and dyspnea with ADL referred  for possible CRT upgrade given his generator is at [facility].  Card: Dr. Burkett  ECHO(12/29/2023): LVEF 27%, severely decreased systolic function. Abnormal (paradoxical) septal motion consistent with RV pacemaker. Mild (grade I) LV diastolic dysfunction. The apex is dyskinetic. The apical septal segment and apical lateral segment are akinetic. The entire anterior wall, entire inferior wall, basal and mid anterolateral wall, basal and mid anterior septum, basal and mid inferior septum, and basal and mid inferolateral wall are hypokinetic. LA mildly dilated (JB 21.98). Mild AR/TR. Moderate MR. Mild pHTN.  NUCLEAR STRESS TEST(12/29/2023): Large sized moderate to severe, fixed defect in the inferoapical, apical lateral walls consistent with infarct. There is a medium size moderate, fixed defect in the inferior wall consistent with infarct. There is a small sized, mild to moderate fixed defect in the mid to basal anteroseptum consistent with infarct. No evidence of ischemia.

## 2024-07-30 NOTE — H&P CARDIOLOGY - NSICDXFAMILYHX_GEN_ALL_CORE_FT
FAMILY HISTORY:  Sibling  Still living? Unknown  Family history of AICD (automatic internal cardiac defibrillator), Age at diagnosis: Age Unknown

## 2024-07-31 ENCOUNTER — RESULT REVIEW (OUTPATIENT)
Age: 89
End: 2024-07-31

## 2024-07-31 LAB
A1C WITH ESTIMATED AVERAGE GLUCOSE RESULT: 5.4 % — SIGNIFICANT CHANGE UP (ref 4–5.6)
ALBUMIN SERPL ELPH-MCNC: 3.5 G/DL — SIGNIFICANT CHANGE UP (ref 3.3–5)
ALP SERPL-CCNC: 99 U/L — SIGNIFICANT CHANGE UP (ref 40–120)
ALT FLD-CCNC: 13 U/L — SIGNIFICANT CHANGE UP (ref 10–45)
ANION GAP SERPL CALC-SCNC: 15 MMOL/L — SIGNIFICANT CHANGE UP (ref 5–17)
AST SERPL-CCNC: 19 U/L — SIGNIFICANT CHANGE UP (ref 10–40)
BILIRUB SERPL-MCNC: 0.4 MG/DL — SIGNIFICANT CHANGE UP (ref 0.2–1.2)
BUN SERPL-MCNC: 22 MG/DL — SIGNIFICANT CHANGE UP (ref 7–23)
CALCIUM SERPL-MCNC: 9.1 MG/DL — SIGNIFICANT CHANGE UP (ref 8.4–10.5)
CHLORIDE SERPL-SCNC: 105 MMOL/L — SIGNIFICANT CHANGE UP (ref 96–108)
CHOLEST SERPL-MCNC: 133 MG/DL — SIGNIFICANT CHANGE UP
CO2 SERPL-SCNC: 18 MMOL/L — LOW (ref 22–31)
CREAT SERPL-MCNC: 1.27 MG/DL — SIGNIFICANT CHANGE UP (ref 0.5–1.3)
EGFR: 53 ML/MIN/1.73M2 — LOW
ESTIMATED AVERAGE GLUCOSE: 108 MG/DL — SIGNIFICANT CHANGE UP (ref 68–114)
GLUCOSE SERPL-MCNC: 115 MG/DL — HIGH (ref 70–99)
HCT VFR BLD CALC: 33.6 % — LOW (ref 39–50)
HCT VFR BLD CALC: 37.9 % — LOW (ref 39–50)
HDLC SERPL-MCNC: 61 MG/DL — SIGNIFICANT CHANGE UP
HGB BLD-MCNC: 11.3 G/DL — LOW (ref 13–17)
HGB BLD-MCNC: 12.2 G/DL — LOW (ref 13–17)
LIPID PNL WITH DIRECT LDL SERPL: 57 MG/DL — SIGNIFICANT CHANGE UP
MAGNESIUM SERPL-MCNC: 2.1 MG/DL — SIGNIFICANT CHANGE UP (ref 1.6–2.6)
MCHC RBC-ENTMCNC: 31.7 PG — SIGNIFICANT CHANGE UP (ref 27–34)
MCHC RBC-ENTMCNC: 31.8 PG — SIGNIFICANT CHANGE UP (ref 27–34)
MCHC RBC-ENTMCNC: 32.2 GM/DL — SIGNIFICANT CHANGE UP (ref 32–36)
MCHC RBC-ENTMCNC: 33.6 GM/DL — SIGNIFICANT CHANGE UP (ref 32–36)
MCV RBC AUTO: 94.6 FL — SIGNIFICANT CHANGE UP (ref 80–100)
MCV RBC AUTO: 98.4 FL — SIGNIFICANT CHANGE UP (ref 80–100)
NON HDL CHOLESTEROL: 72 MG/DL — SIGNIFICANT CHANGE UP
NRBC # BLD: 0 /100 WBCS — SIGNIFICANT CHANGE UP (ref 0–0)
NRBC # BLD: 0 /100 WBCS — SIGNIFICANT CHANGE UP (ref 0–0)
PHOSPHATE SERPL-MCNC: 2.9 MG/DL — SIGNIFICANT CHANGE UP (ref 2.5–4.5)
PLATELET # BLD AUTO: 161 K/UL — SIGNIFICANT CHANGE UP (ref 150–400)
PLATELET # BLD AUTO: 167 K/UL — SIGNIFICANT CHANGE UP (ref 150–400)
POTASSIUM SERPL-MCNC: 4 MMOL/L — SIGNIFICANT CHANGE UP (ref 3.5–5.3)
POTASSIUM SERPL-SCNC: 4 MMOL/L — SIGNIFICANT CHANGE UP (ref 3.5–5.3)
PROT SERPL-MCNC: 6.1 G/DL — SIGNIFICANT CHANGE UP (ref 6–8.3)
RBC # BLD: 3.55 M/UL — LOW (ref 4.2–5.8)
RBC # BLD: 3.85 M/UL — LOW (ref 4.2–5.8)
RBC # FLD: 14.8 % — HIGH (ref 10.3–14.5)
RBC # FLD: 15.1 % — HIGH (ref 10.3–14.5)
SODIUM SERPL-SCNC: 138 MMOL/L — SIGNIFICANT CHANGE UP (ref 135–145)
TRIGL SERPL-MCNC: 70 MG/DL — SIGNIFICANT CHANGE UP
TSH SERPL-MCNC: 1.46 UIU/ML — SIGNIFICANT CHANGE UP (ref 0.27–4.2)
TSH SERPL-MCNC: 1.51 UIU/ML — SIGNIFICANT CHANGE UP (ref 0.27–4.2)
WBC # BLD: 11.12 K/UL — HIGH (ref 3.8–10.5)
WBC # BLD: 11.47 K/UL — HIGH (ref 3.8–10.5)
WBC # FLD AUTO: 11.12 K/UL — HIGH (ref 3.8–10.5)
WBC # FLD AUTO: 11.47 K/UL — HIGH (ref 3.8–10.5)

## 2024-07-31 PROCEDURE — 99233 SBSQ HOSP IP/OBS HIGH 50: CPT

## 2024-07-31 PROCEDURE — 99232 SBSQ HOSP IP/OBS MODERATE 35: CPT

## 2024-07-31 PROCEDURE — 93308 TTE F-UP OR LMTD: CPT | Mod: 26

## 2024-07-31 PROCEDURE — 93321 DOPPLER ECHO F-UP/LMTD STD: CPT | Mod: 26

## 2024-07-31 PROCEDURE — 71046 X-RAY EXAM CHEST 2 VIEWS: CPT | Mod: 26

## 2024-07-31 PROCEDURE — 93010 ELECTROCARDIOGRAM REPORT: CPT

## 2024-07-31 RX ORDER — LOSARTAN POTASSIUM 50 MG/1
25 TABLET, FILM COATED ORAL DAILY
Refills: 0 | Status: DISCONTINUED | OUTPATIENT
Start: 2024-07-31 | End: 2024-07-31

## 2024-07-31 RX ORDER — ACETAMINOPHEN 500 MG
650 TABLET ORAL ONCE
Refills: 0 | Status: COMPLETED | OUTPATIENT
Start: 2024-07-31 | End: 2024-07-31

## 2024-07-31 RX ORDER — SACUBITRIL AND VALSARTAN 49; 51 MG/1; MG/1
1 TABLET, FILM COATED ORAL
Refills: 0 | Status: DISCONTINUED | OUTPATIENT
Start: 2024-07-31 | End: 2024-07-31

## 2024-07-31 RX ORDER — SPIRONOLACTONE 50 MG/1
25 TABLET, FILM COATED ORAL DAILY
Refills: 0 | Status: DISCONTINUED | OUTPATIENT
Start: 2024-07-31 | End: 2024-08-01

## 2024-07-31 RX ORDER — METOPROLOL TARTRATE 100 MG
12.5 TABLET ORAL
Refills: 0 | Status: DISCONTINUED | OUTPATIENT
Start: 2024-07-31 | End: 2024-08-01

## 2024-07-31 RX ORDER — METOPROLOL TARTRATE 100 MG
50 TABLET ORAL DAILY
Refills: 0 | Status: DISCONTINUED | OUTPATIENT
Start: 2024-07-31 | End: 2024-07-31

## 2024-07-31 RX ORDER — BACTERIOSTATIC SODIUM CHLORIDE 0.9 %
500 VIAL (ML) INJECTION ONCE
Refills: 0 | Status: COMPLETED | OUTPATIENT
Start: 2024-07-31 | End: 2024-07-31

## 2024-07-31 RX ORDER — CEFAZOLIN SODIUM 10 G
2000 VIAL (EA) INJECTION ONCE
Refills: 0 | Status: COMPLETED | OUTPATIENT
Start: 2024-07-31 | End: 2024-07-31

## 2024-07-31 RX ADMIN — IPRATROPIUM BROMIDE AND ALBUTEROL SULFATE 3 MILLILITER(S): 2.5; .5 SOLUTION RESPIRATORY (INHALATION) at 11:04

## 2024-07-31 RX ADMIN — CLOPIDOGREL BISULFATE 75 MILLIGRAM(S): 75 TABLET, FILM COATED ORAL at 05:36

## 2024-07-31 RX ADMIN — Medication 500 MILLILITER(S): at 01:05

## 2024-07-31 RX ADMIN — BUDESONIDE AND FORMOTEROL FUMARATE DIHYDRATE 2 PUFF(S): 80; 4.5 AEROSOL RESPIRATORY (INHALATION) at 05:38

## 2024-07-31 RX ADMIN — ATORVASTATIN CALCIUM 10 MILLIGRAM(S): 40 TABLET, FILM COATED ORAL at 23:36

## 2024-07-31 RX ADMIN — Medication 650 MILLIGRAM(S): at 05:05

## 2024-07-31 RX ADMIN — FINASTERIDE 5 MILLIGRAM(S): 5 TABLET, FILM COATED ORAL at 13:03

## 2024-07-31 RX ADMIN — Medication 0.8 MILLIGRAM(S): at 23:36

## 2024-07-31 RX ADMIN — TIOTROPIUM BROMIDE 2 PUFF(S): 18 CAPSULE ORAL; RESPIRATORY (INHALATION) at 11:05

## 2024-07-31 RX ADMIN — Medication 12.5 MILLIGRAM(S): at 17:14

## 2024-07-31 RX ADMIN — CHLORHEXIDINE GLUCONATE 1 APPLICATION(S): 500 CLOTH TOPICAL at 23:36

## 2024-07-31 RX ADMIN — Medication 12.5 MILLIGRAM(S): at 05:36

## 2024-07-31 RX ADMIN — Medication 100 MILLIGRAM(S): at 10:08

## 2024-07-31 RX ADMIN — Medication 650 MILLIGRAM(S): at 05:20

## 2024-07-31 RX ADMIN — SPIRONOLACTONE 25 MILLIGRAM(S): 50 TABLET, FILM COATED ORAL at 05:36

## 2024-07-31 NOTE — PROGRESS NOTE ADULT - ASSESSMENT
3M Azeri speaking Hx HTN, PAF (Eliquis, last dose 7/27), CHB s/p dual chamber PPM ([employer], implantation 2/2017), CAD s/p PCI, mixed restrictive and obstructive pulmonary disease, SESAR (not on CPAP), [place]; admit 7/30 with c/o fatigue/NELSON, generator is CARMITA s/p CRT upgrade c/b brief hypotensive post procedure s/p Mayur bolus, transfer to CICU for close monitoring.     NEURO  - No active issues; AO3    PULM  #Hx SESAR; mixed restrictive and obstructive pulm disease  - c/w home Spiriva, Symbicort, c/w Duonebs   - Sp02 > 94% on RA  - Nocturnal CPAP      CARDIOVASCULAR  #CARMITA s/p CRT upgrade; CHB s/p dual chamber PPM ([employer], implantation 2/2017)  - s/p CRT upgrade c/b brief hypotension post procedure likely sedation related   - Mild swelling at CRT site, compression dsg intact/dry, H/H stable   - POCUS: no pericardial effusion   - PA/Lat CXR in AM  - DC planning    #Hx Chronic Systolic HF, EF 27%  - TTE 12/2023: severely decreased LVSF (EF 27%), WMAs, Grade 1 DD, normal RVF  - restart GDMT: Toprol 50 XL. [place] 24/26, [place] 25  - Euvolemic, target net even, strict I/Os, daily weights    #Hx PAF  - Biv pacing, c/w Toprol   - f/u EP recs: restarting Eliquis     #Hx CAD  - c/w Plavix, Lipitor     GI  - DASH diet    RENAL  - No active issues, SCr 1.27 - euvolemic, target even   - strict I/Os    HEME  - H/H stable, active T&S  - DVT ppx: SCDs, resume DOAC per EP    Endo  - A1c, TFTs    ID  #Leukocytosis, mild   - Possible reactive, afebrile - monitor off Abx   3M Amharic speaking Hx HTN, PAF (Eliquis, last dose 7/27), CHB s/p dual chamber PPM ([employer], implantation 2/2017), CAD s/p PCI, mixed restrictive and obstructive pulmonary disease, SESAR (not on CPAP), [place]; admit 7/30 with c/o fatigue/NELSON, generator is CARMITA s/p CRT upgrade c/b brief hypotensive post procedure s/p Mayur bolus, transfer to CICU for close monitoring.     NEURO  AOx3, no active issues.     PULM  #Hx SESAR; mixed restrictive and obstructive pulm disease  - c/w home Spiriva, Symbicort, c/w Duonebs   - Sp02 > 94% on RA  - Nocturnal CPAP      CARDIOVASCULAR  #CARMITA s/p CRT upgrade; CHB s/p dual chamber PPM ([employer], implantation 2/2017)  - s/p CRT upgrade c/b brief hypotension post procedure likely sedation related   - Mild swelling at CRT site, compression dsg intact/dry, H/H stable   - POCUS: no pericardial effusion   - PA/Lat CXR in AM  - DC planning    #Hx Chronic Systolic HF, EF 27%  *TTE 12/2023: severely decreased LVSF (EF 27%), WMAs, Grade 1 DD, normal RVF  - restart GDMT: Toprol 50 XL. [place] 24/26, [place] 25  - Euvolemic, target net even, strict I/Os, daily weights    #Hx PAF  - Biv pacing, c/w Toprol   - f/u EP recs: restarting Eliquis     #Hx CAD  - c/w Plavix, Lipitor   - f/u lipids    GI  - DASH diet    RENAL  - No active issues, SCr 1.27 - euvolemic, target even   - strict I/Os    HEME  - H/H stable, active T&S  - DVT ppx: SCDs, resume DOAC per EP    Endo  - A1c, TFTs    ID  #Leukocytosis, mild   - Possible reactive, afebrile - monitor off Abx   3M Polish speaking Hx HTN, PAF (Eliquis, last dose 7/27), CHB s/p dual chamber PPM ([employer], implantation 2/2017), CAD s/p PCI, mixed restrictive and obstructive pulmonary disease, SESAR (not on CPAP), [place]; admit 7/30 with c/o fatigue/NELSON, generator is CARMITA s/p CRT upgrade c/b brief hypotensive post procedure s/p Mayur bolus, transfer to CICU for close monitoring.     NEURO  AOx3, no active issues.     PULM  #Hx SESAR; mixed restrictive and obstructive pulm disease  - c/w home Spiriva, Symbicort, c/w Duonebs   - Sp02 > 94% on RA  - Nocturnal CPAP      CARDIOVASCULAR  #CARMITA s/p CRT upgrade; CHB s/p dual chamber PPM ([employer], implantation 2/2017)  *s/p CRT upgrade c/b brief hypotension post procedure likely sedation related   *Mild swelling at CRT site, compression dsg intact/dry, H/H stable   *POCUS: no pericardial effusion   -f/u  PA/Lat CXR in AM  - DC planning    #Hx Chronic Systolic HF, EF 27%  *TTE 12/2023: severely decreased LVSF (EF 27%), WMAs, Grade 1 DD, normal RVF  - restart GDMT: Toprol 50 XL, [place] 25  - Euvolemic, target net even, strict I/Os, daily weights    #Hx PAF  - Biv pacing, c/w Toprol 12.5 BID   - f/u EP reccs: restarting Eliquis     #Hx CAD  - c/w Plavix 75  - c/w Atorvastatin 10   - f/u lipids    GI  - Diet: DASH     RENAL  No active issues, SCr 1.27, euvolemic   - strict I/Os, target net even     HEME  *H/H stable, active T&S  - DVT ppx: SCDs, resume DOAC per EP    ENDO  No active issues. bGs 80s-100s.   - f/u A1c, TFTs    ID  #Leukocytosis, mild   *Possible reactive, afebrile   - Monitor off abx   3M Upper sorbian speaking Hx HTN, PAF (Eliquis, last dose 7/27), CHB s/p dual chamber PPM ([employer], implantation 2/2017), CAD s/p PCI, mixed restrictive and obstructive pulmonary disease, SESAR (not on CPAP), [place]; admit 7/30 with c/o fatigue/NELSON, generator is CARMITA s/p CRT upgrade c/b brief hypotensive post procedure s/p Mayur bolus, transfer to CICU for close monitoring.     NEURO  AOx3, no active issues.     PULM  #Hx SESAR; mixed restrictive and obstructive pulm disease  - c/w home Spiriva, Symbicort, c/w Duonebs   - Sp02 > 94% on RA  - Nocturnal CPAP      CARDIOVASCULAR  #CARMITA s/p CRT upgrade; CHB s/p dual chamber PPM ([employer], implantation 2/2017)  *s/p CRT upgrade c/b brief hypotension post procedure likely sedation related   *Mild swelling at CRT site, compression dsg intact/dry, H/H stable   *POCUS: no pericardial effusion   - Cefazolin x1 now  - f/u with EP about post-procedure abx ppx  - f/u  PA/Lat CXR   - DC planning    #Hx Chronic Systolic HF, EF 27%  *TTE 12/2023: severely decreased LVSF (EF 27%), WMAs, Grade 1 DD, normal RVF  - restart GDMT: Toprol 50 XL, [place] 25  - Euvolemic, target net even, strict I/Os, daily weights    #Hx PAF  - Biv pacing, c/w Toprol 12.5 BID   - f/u EP reccs: restarting Eliquis     #Hx CAD  - c/w Plavix 75  - c/w Atorvastatin 10   - f/u lipids    GI  - Diet: DASH     RENAL  No active issues, SCr 1.27, euvolemic   - strict I/Os, target net even     HEME  *H/H stable, active T&S  - DVT ppx: SCDs, resume DOAC per EP    ENDO  No active issues. bGs 80s-100s.   - f/u A1c, TFTs    ID  #Leukocytosis, mild   *Possible reactive, afebrile   - 1x Cefazolin now for post-procedure ppx    3M Luxembourgish speaking Hx HTN, PAF (Eliquis, last dose 7/27), CHB s/p dual chamber PPM ([employer], implantation 2/2017), CAD s/p PCI, mixed restrictive and obstructive pulmonary disease, SESAR (not on CPAP), [place]; admit 7/30 with c/o fatigue/NELSON, generator is CARMITA s/p CRT upgrade c/b brief hypotensive post procedure s/p Mayur bolus, transfer to CICU for close monitoring.     NEURO  AOx3, no active issues.     PULM  #Hx SESAR; mixed restrictive and obstructive pulm disease  - c/w home Spiriva, Symbicort, c/w Duonebs   - Sp02 > 94% on RA  - Nocturnal CPAP      CARDIOVASCULAR  #CARMITA s/p CRT upgrade; CHB s/p dual chamber PPM ([employer], implantation 2/2017)  *s/p CRT upgrade c/b brief hypotension post procedure likely sedation related   *Mild swelling at CRT site, compression dsg intact/dry, H/H stable   *POCUS: no pericardial effusion   - Cefazolin x1 now  - f/u with EP about post-procedure abx ppx  - f/u  PA/Lat CXR   - DC planning    #Hx Chronic Systolic HF, EF 27%  *TTE 12/2023: severely decreased LVSF (EF 27%), WMAs, Grade 1 DD, normal RVF  -  GDMT: Lopressor 12.5 BID, [place] 25  - Euvolemic, target net even, strict I/Os, daily weights    #Hx PAF  - Biv pacing, c/w Toprol 12.5 BID   - f/u EP reccs: restarting Eliquis     #Hx CAD  - c/w Plavix 75  - c/w Atorvastatin 10   - f/u lipids    GI  - Diet: DASH     RENAL  No active issues, SCr 1.27, euvolemic   - strict I/Os, target net even     HEME  *H/H stable, active T&S  - DVT ppx: SCDs, resume DOAC per EP    ENDO  No active issues. bGs 80s-100s.   - f/u A1c, TFTs    ID  #Leukocytosis, mild   *Possible reactive, afebrile   - 1x Cefazolin now for post-procedure ppx

## 2024-07-31 NOTE — PROGRESS NOTE ADULT - SUBJECTIVE AND OBJECTIVE BOX
Admission date:  CHIEF COMPLAINT:  HPI:  89 year old male, (Divehi speaking, daughter at bedside, unable to hear well with , used daughter as ), with PMHx of, HTN, CAD s/p PCI w/HAYDER, mixed restrictive and obstructive pulmonary disease, SESAR (not on CPAP), [place],  PAF on Eliquis ( on hold since saturday), CHB s/p dual chamber( [employer]) PPM implantation on 2/21/2017, who has developed progressive fatigue and dyspnea with ADL referred  for possible CRT upgrade given his generator is at CARMITA.  Card: Dr. Burkett  ECHO(12/29/2023): LVEF 27%, severely decreased systolic function. Abnormal (paradoxical) septal motion consistent with RV pacemaker. Mild (grade I) LV diastolic dysfunction. The apex is dyskinetic. The apical septal segment and apical lateral segment are akinetic. The entire anterior wall, entire inferior wall, basal and mid anterolateral wall, basal and mid anterior septum, basal and mid inferior septum, and basal and mid inferolateral wall are hypokinetic. LA mildly dilated (JB 21.98). Mild AR/TR. Moderate MR. Mild pHTN.  NUCLEAR STRESS TEST(12/29/2023): Large sized moderate to severe, fixed defect in the inferoapical, apical lateral walls consistent with infarct. There is a medium size moderate, fixed defect in the inferior wall consistent with infarct. There is a small sized, mild to moderate fixed defect in the mid to basal anteroseptum consistent with infarct. No evidence of ischemia. (30 Jul 2024 10:07)    INTERVAL HISTORY:    REVIEW OF SYSTEMS: Denies xxxxxx; all others negative    MEDICATIONS  (STANDING):  albuterol/ipratropium for Nebulization 3 milliLiter(s) Nebulizer every 6 hours  atorvastatin 10 milliGRAM(s) Oral at bedtime  budesonide  80 MICROgram(s)/formoterol 4.5 MICROgram(s) Inhaler 2 Puff(s) Inhalation two times a day  chlorhexidine 4% Liquid 1 Application(s) Topical once  clopidogrel Tablet 75 milliGRAM(s) Oral daily  finasteride 5 milliGRAM(s) Oral daily  metoprolol tartrate 12.5 milliGRAM(s) Oral two times a day  spironolactone 25 milliGRAM(s) Oral daily  tamsulosin 0.8 milliGRAM(s) Oral at bedtime  tiotropium 2.5 MICROgram(s) Inhaler 2 Puff(s) Inhalation daily    MEDICATIONS  (PRN):      Objective:  ICU Vital Signs Last 24 Hrs  T(C): 36.8 (31 Jul 2024 11:03), Max: 36.8 (31 Jul 2024 11:03)  T(F): 98.2 (31 Jul 2024 11:03), Max: 98.2 (31 Jul 2024 11:03)  HR: 66 (31 Jul 2024 22:00) (60 - 79)  BP: 131/62 (31 Jul 2024 22:00) (77/44 - 178/72)  BP(mean): 89 (31 Jul 2024 22:00) (56 - 103)  ABP: --  ABP(mean): --  RR: 26 (31 Jul 2024 22:00) (11 - 29)  SpO2: 93% (31 Jul 2024 22:00) (93% - 100%)    O2 Parameters below as of 31 Jul 2024 21:00  Patient On (Oxygen Delivery Method): room air                07-30 @ 07:01 - 07-31 @ 07:00  --------------------------------------------------------  IN: 620 mL / OUT: 550 mL / NET: 70 mL    07-31 @ 07:01 - 07-31 @ 23:02  --------------------------------------------------------  IN: 470 mL / OUT: 775 mL / NET: -305 mL      Daily     Daily     PHYSICAL EXAM:      Constitutional:    HEENT:    Respiratory:    Cardiovascular:  Access site:    Gastrointestinal:    Genitourinary:    Extremities:    Vascular:    Neurological:    Skin:      TELEMETRY:     EKG:     IMAGING:    Labs:                          11.3   11.12 )-----------( 161      ( 31 Jul 2024 09:44 )             33.6     07-31    138  |  105  |  22  ----------------------------<  115<H>  4.0   |  18<L>  |  1.27    Ca    9.1      31 Jul 2024 01:25  Phos  2.9     07-31  Mg     2.1     07-31    TPro  6.1  /  Alb  3.5  /  TBili  0.4  /  DBili  x   /  AST  19  /  ALT  13  /  AlkPhos  99  07-31    LIVER FUNCTIONS - ( 31 Jul 2024 01:25 )  Alb: 3.5 g/dL / Pro: 6.1 g/dL / ALK PHOS: 99 U/L / ALT: 13 U/L / AST: 19 U/L / GGT: x               Urinalysis Basic - ( 31 Jul 2024 01:25 )    Color: x / Appearance: x / SG: x / pH: x  Gluc: 115 mg/dL / Ketone: x  / Bili: x / Urobili: x   Blood: x / Protein: x / Nitrite: x   Leuk Esterase: x / RBC: x / WBC x   Sq Epi: x / Non Sq Epi: x / Bacteria: x        HEALTH ISSUES - PROBLEM Dx:      ASSESSMENT:    89M Irish speaking Hx HTN, PAF (Eliquis, last dose 7/27), CHB s/p dual chamber PPM ([employer], implantation 2/2017), CAD s/p PCI, mixed restrictive and obstructive pulmonary disease, SESAR (not on CPAP), [place]; admit 7/30 with c/o fatigue/NELSON, generator is CARMITA s/p CRT upgrade c/b brief hypotensive post procedure s/p Mayur bolus, transfer to CICU for close monitoring.     NEURO  AOx3, no active issues.     PULM  #Hx SESAR; mixed restrictive and obstructive pulm disease  - c/w home Spiriva, Symbicort, c/w Duonebs   - Sp02 > 94% on RA  - Nocturnal CPAP      CARDIOVASCULAR  #CARMITA s/p CRT upgrade; CHB s/p dual chamber PPM ([employer], implantation 2/2017)  *s/p CRT upgrade c/b brief hypotension post procedure likely sedation related   *Mild swelling at CRT site, compression dsg intact/dry, H/H stable   *POCUS: no pericardial effusion   - Cefazolin x1 now  - f/u with EP about post-procedure abx ppx  - PA/Lat CXR completed   - DC planning    #Hx Chronic Systolic HF, EF 27%  *TTE 12/2023: severely decreased LVSF (EF 27%), WMAs, Grade 1 DD, normal RVF  -  GDMT: Lopressor 25 BID, [place] 25  - Euvolemic, target net even, strict I/Os, daily weights    #Hx PAF  - Biv pacing, c/w Toprol 12.5 BID   - f/u EP reccs: restarting Eliquis     #Hx CAD  - c/w Plavix 75  - c/w Atorvastatin 10   - f/u lipids    GI  - Diet: DASH     RENAL  No active issues, SCr 1.27, euvolemic   - strict I/Os, target net even     HEME  *H/H stable, active T&S  - DVT ppx: SCDs, resume DOAC per EP    ENDO  No active issues. bGs 80s-100s.   - f/u A1c, TFTs    ID  #Leukocytosis, mild   *Possible reactive, afebrile     Patient requires continuous monitoring with bedside rhythm monitoring, pulse ox monitoring, and intermittent blood gas analysis. Care plan discussed with ICU care team. Patient remained critical and at risk for life threatening decompensation.  Patient seen, examined and plan discussed with CCU team during rounds.     I have personally provided 35 minutes of critical care time excluding time spent on separate procedures, in addition to initial critical care time provided by the CICU Attending, Dr. Esparza

## 2024-07-31 NOTE — PROGRESS NOTE ADULT - SUBJECTIVE AND OBJECTIVE BOX
24H hour events: s/p upgrade to CRTP  Upgraded to CICU overnight for hypotension, stat TTE negative for pc effusion    MEDICATIONS:  clopidogrel Tablet 75 milliGRAM(s) Oral daily  metoprolol tartrate 12.5 milliGRAM(s) Oral two times a day  spironolactone 25 milliGRAM(s) Oral daily      albuterol/ipratropium for Nebulization 3 milliLiter(s) Nebulizer every 6 hours  budesonide  80 MICROgram(s)/formoterol 4.5 MICROgram(s) Inhaler 2 Puff(s) Inhalation two times a day  tiotropium 2.5 MICROgram(s) Inhaler 2 Puff(s) Inhalation daily        atorvastatin 10 milliGRAM(s) Oral at bedtime  finasteride 5 milliGRAM(s) Oral daily    chlorhexidine 4% Liquid 1 Application(s) Topical once  tamsulosin 0.8 milliGRAM(s) Oral at bedtime      REVIEW OF SYSTEMS:  See HPI, otherwise ROS negative.    PHYSICAL EXAM:  T(C): 36.7 (07-31-24 @ 09:00), Max: 36.9 (07-30-24 @ 20:50)  HR: 61 (07-31-24 @ 10:16) (56 - 79)  BP: 117/58 (07-31-24 @ 10:16) (65/28 - 149/64)  RR: 22 (07-31-24 @ 10:16) (11 - 24)  SpO2: 96% (07-31-24 @ 10:16) (92% - 100%)  Wt(kg): --  I&O's Summary    30 Jul 2024 07:01  -  31 Jul 2024 07:00  --------------------------------------------------------  IN: 620 mL / OUT: 550 mL / NET: 70 mL    31 Jul 2024 07:01  -  31 Jul 2024 10:57  --------------------------------------------------------  IN: 290 mL / OUT: 200 mL / NET: 90 mL        Appearance: Alert. NAD	  Cardiovascular: +S1S2 RRR no m/g/r  Respiratory: CTA B/L	  Psychiatry: A & O x 3, Mood & affect appropriate  Gastrointestinal:  Soft, NT. ND. +BS	  Skin: No rashes, masses or lesions  L infraclavicular site cdi, soft, tender to palpation, ecchymosis at site/axilla, device border discretely palpated no hematoma noted   Neurologic: Non-focal  Extremities: No edema BLE  Vascular: Peripheral pulses palpable 2+ bilaterally      LABS:	 	    CBC Full  -  ( 31 Jul 2024 09:44 )  WBC Count : 11.12 K/uL  Hemoglobin : 11.3 g/dL  Hematocrit : 33.6 %  Platelet Count - Automated : 161 K/uL  Mean Cell Volume : 94.6 fl  Mean Cell Hemoglobin : 31.8 pg  Mean Cell Hemoglobin Concentration : 33.6 gm/dL  Auto Neutrophil # : x  Auto Lymphocyte # : x  Auto Monocyte # : x  Auto Eosinophil # : x  Auto Basophil # : x  Auto Neutrophil % : x  Auto Lymphocyte % : x  Auto Monocyte % : x  Auto Eosinophil % : x  Auto Basophil % : x    07-31    138  |  105  |  22  ----------------------------<  115<H>  4.0   |  18<L>  |  1.27  07-30    141  |  107  |  20  ----------------------------<  139<H>  4.3   |  22  |  1.23    Ca    9.1      31 Jul 2024 01:25  Ca    9.0      30 Jul 2024 21:28  Phos  2.9     07-31  Mg     2.1     07-31  Mg     2.2     07-30    TPro  6.1  /  Alb  3.5  /  TBili  0.4  /  DBili  x   /  AST  19  /  ALT  13  /  AlkPhos  99  07-31    TELEMETRY: ASVP/APVP  	    RADIOLOGY: < from: Xray Chest 2 Views PA/Lat (07.31.24 @ 09:10) >  IMPRESSION:  Status post CRT of grade with leads in appropriate position.    Left pleural effusion.    < end of copied text >

## 2024-07-31 NOTE — CHART NOTE - NSCHARTNOTEFT_GEN_A_CORE
MICU DOWN GRADE NOTE    Unit: [ ] Medicine [ ] Telemetry     Accepting Physician:    Patient is a 89y old  Male who presents with a chief complaint of complete heart block s/p PPM, CRT upgrade (31 Jul 2024 08:04)      HPI: 89 year old male, (Welsh speaking, daughter at bedside, unable to hear well with , used daughter as ), with PMHx of, HTN, CAD s/p PCI w/HAYDER, mixed restrictive and obstructive pulmonary disease, SESAR (not on CPAP), [place],  PAF on Eliquis ( on hold since saturday), CHB s/p dual chamber( [employer]) PPM implantation on 2/21/2017, who has developed progressive fatigue and dyspnea with ADL referred  for possible CRT upgrade given his generator is at [facility].      INTERVAL HPI/OVERNIGHT EVENTS:   - O/N: hypotension 75/40, given 250cc IVF bolus x2 w/o change --> Mayur x2. Also appears to have L. anterior chest wall expanding hematoma, pressure dressing applied, Hgb stable.         REVIEW OF SYSTEMS:  CONSTITUTIONAL: No fever, chills  HEENT:  No blurry vision No sinus or throat pain  NECK: No pain or stiffness  RESPIRATORY: No cough, wheezing, chills or hemoptysis; No shortness of breath  CARDIOVASCULAR: L. anterior chest incision with underlying hematoma, surrounding ecchymosis and erythema. No chest pain, palpitations  GASTROINTESTINAL: No abdominal pain. No nausea, vomiting, or diarrhea  GENITOURINARY: No dysuria  NEUROLOGICAL: No HA, No focal weakness  SKIN: No itching, burning, rashes, or lesions   MUSCULOSKELETAL: No joint pain or swelling; No muscle, back, or extremity pain    MEDICATIONS:  albuterol/ipratropium for Nebulization 3 milliLiter(s) Nebulizer every 6 hours  atorvastatin 10 milliGRAM(s) Oral at bedtime  budesonide  80 MICROgram(s)/formoterol 4.5 MICROgram(s) Inhaler 2 Puff(s) Inhalation two times a day  chlorhexidine 4% Liquid 1 Application(s) Topical once  clopidogrel Tablet 75 milliGRAM(s) Oral daily  finasteride 5 milliGRAM(s) Oral daily  metoprolol tartrate 12.5 milliGRAM(s) Oral two times a day  spironolactone 25 milliGRAM(s) Oral daily  tamsulosin 0.8 milliGRAM(s) Oral at bedtime  tiotropium 2.5 MICROgram(s) Inhaler 2 Puff(s) Inhalation daily      T(C): 36.8 (07-31-24 @ 11:03), Max: 36.9 (07-30-24 @ 20:50)  HR: 67 (07-31-24 @ 14:00) (56 - 79)  BP: 110/51 (07-31-24 @ 14:00) (65/28 - 149/65)  RR: 29 (07-31-24 @ 14:00) (11 - 29)  SpO2: 100% (07-31-24 @ 14:00) (92% - 100%)  Wt(kg): --Vital Signs Last 24 Hrs  T(C): 36.8 (31 Jul 2024 11:03), Max: 36.9 (30 Jul 2024 20:50)  T(F): 98.2 (31 Jul 2024 11:03), Max: 98.4 (30 Jul 2024 20:50)  HR: 67 (31 Jul 2024 14:00) (56 - 79)  BP: 110/51 (31 Jul 2024 14:00) (65/28 - 149/65)  BP(mean): 74 (31 Jul 2024 14:00) (39 - 100)  RR: 29 (31 Jul 2024 14:00) (11 - 29)  SpO2: 100% (31 Jul 2024 14:00) (92% - 100%)    Parameters below as of 31 Jul 2024 14:00  Patient On (Oxygen Delivery Method): room air        PHYSICAL EXAM:  GENERAL: NAD, well-groomed, well-developed  HEAD:  Atraumatic, Normocephalic  EYES: EOMI, PERRLA, conjunctiva and sclera clear  ENMT:  Moist mucous membranes  NECK: Supple, No JVD,  CHEST/LUNG: Clear to auscultation bilaterally; No rales, rhonchi, wheezing, or rubs  HEART: Regular rate and rhythm; No murmurs, rubs, or gallops  ABDOMEN: Soft, Nontender, Nondistended; Bowel sounds present  NEURO: Alert & Oriented X3  EXTREMITIES: No LE edema, no calf tenderness  LYMPH: No lymphadenopathy noted  SKIN: No rashes or lesions    Consultant(s) Notes Reviewed:  [x ] YES  [ ] NO  Care Discussed with Consultants/Other Providers [ x] YES  [ ] NO    LABS:                        11.3   11.12 )-----------( 161      ( 31 Jul 2024 09:44 )             33.6     07-31    138  |  105  |  22  ----------------------------<  115<H>  4.0   |  18<L>  |  1.27    Ca    9.1      31 Jul 2024 01:25  Phos  2.9     07-31  Mg     2.1     07-31    TPro  6.1  /  Alb  3.5  /  TBili  0.4  /  DBili  x   /  AST  19  /  ALT  13  /  AlkPhos  99  07-31      Urinalysis Basic - ( 31 Jul 2024 01:25 )    Color: x / Appearance: x / SG: x / pH: x  Gluc: 115 mg/dL / Ketone: x  / Bili: x / Urobili: x   Blood: x / Protein: x / Nitrite: x   Leuk Esterase: x / RBC: x / WBC x   Sq Epi: x / Non Sq Epi: x / Bacteria: x      CAPILLARY BLOOD GLUCOSE            Urinalysis Basic - ( 31 Jul 2024 01:25 )    Color: x / Appearance: x / SG: x / pH: x  Gluc: 115 mg/dL / Ketone: x  / Bili: x / Urobili: x   Blood: x / Protein: x / Nitrite: x   Leuk Esterase: x / RBC: x / WBC x   Sq Epi: x / Non Sq Epi: x / Bacteria: x        RADIOLOGY & ADDITIONAL TESTS:    Imaging Personally Reviewed:  [x ] YES  [ ] NO MICU DOWN GRADE NOTE    Unit: [ ] Medicine [ ] Telemetry     Accepting Physician:    Patient is a 89y old  Male who presents with a chief complaint of complete heart block s/p PPM, CRT upgrade (31 Jul 2024 08:04)      HPI: 89 year old male, (Hungarian speaking, daughter at bedside, unable to hear well with , used daughter as ), with PMHx of, HTN, CAD s/p PCI w/HAYDER, mixed restrictive and obstructive pulmonary disease, SESAR (not on CPAP), [place],  PAF on Eliquis (on hold since saturday), CHB s/p dual chamber PPM ([employer]) implantation on 2/21/2017, who has developed progressive fatigue and dyspnea with ADL referred for  CRT upgrade given his generator is at [facility]. Patient s/p CRT upgrade on 7/30 c/b brief hypotensive episode post procedure s/p Mayur bolus, transferred to CICU for close monitoring.       INTERVAL HPI/OVERNIGHT EVENTS:   - O/N: hypotension 75/40, given 250cc IVF bolus x2 w/o change --> Mayur x2. Has remained HDS.   - Appears to have L. anterior chest wall expanding hematoma on POCUS, pressure dressing applied, Hgb stable.       REVIEW OF SYSTEMS:  CONSTITUTIONAL: No fever, chills  HEENT:  No blurry vision No sinus or throat pain  NECK: No pain or stiffness  RESPIRATORY: No cough, wheezing, chills or hemoptysis; No shortness of breath  CARDIOVASCULAR: L. anterior chest incision pain + swelling. No palpitations or CP.   GASTROINTESTINAL: No abdominal pain. No nausea, vomiting, or diarrhea  GENITOURINARY: No dysuria  NEUROLOGICAL: No HA, No focal weakness  SKIN: No itching, burning, rashes, or lesions   MUSCULOSKELETAL: No joint pain or swelling; No muscle, back, or extremity pain    MEDICATIONS:  albuterol/ipratropium for Nebulization 3 milliLiter(s) Nebulizer every 6 hours  atorvastatin 10 milliGRAM(s) Oral at bedtime  budesonide  80 MICROgram(s)/formoterol 4.5 MICROgram(s) Inhaler 2 Puff(s) Inhalation two times a day  chlorhexidine 4% Liquid 1 Application(s) Topical once  clopidogrel Tablet 75 milliGRAM(s) Oral daily  finasteride 5 milliGRAM(s) Oral daily  metoprolol tartrate 12.5 milliGRAM(s) Oral two times a day  spironolactone 25 milliGRAM(s) Oral daily  tamsulosin 0.8 milliGRAM(s) Oral at bedtime  tiotropium 2.5 MICROgram(s) Inhaler 2 Puff(s) Inhalation daily      T(C): 36.8 (07-31-24 @ 11:03), Max: 36.9 (07-30-24 @ 20:50)  HR: 67 (07-31-24 @ 14:00) (56 - 79)  BP: 110/51 (07-31-24 @ 14:00) (65/28 - 149/65)  RR: 29 (07-31-24 @ 14:00) (11 - 29)  SpO2: 100% (07-31-24 @ 14:00) (92% - 100%)  Wt(kg): --Vital Signs Last 24 Hrs  T(C): 36.8 (31 Jul 2024 11:03), Max: 36.9 (30 Jul 2024 20:50)  T(F): 98.2 (31 Jul 2024 11:03), Max: 98.4 (30 Jul 2024 20:50)  HR: 67 (31 Jul 2024 14:00) (56 - 79)  BP: 110/51 (31 Jul 2024 14:00) (65/28 - 149/65)  BP(mean): 74 (31 Jul 2024 14:00) (39 - 100)  RR: 29 (31 Jul 2024 14:00) (11 - 29)  SpO2: 100% (31 Jul 2024 14:00) (92% - 100%)    Parameters below as of 31 Jul 2024 14:00  Patient On (Oxygen Delivery Method): room air        PHYSICAL EXAM:  GENERAL: NAD, well-groomed, well-developed  HEAD:  Atraumatic, Normocephalic  EYES: EOMI, PERRLA, conjunctiva and sclera clear  ENMT:  Moist mucous membranes  NECK: Supple, No JVD,  CHEST/LUNG: L. anterior chest incision with underlying hematoma, surrounding ecchymosis and erythema. Clear to auscultation bilaterally; No rales, rhonchi, wheezing, or rubs  HEART: Regular rate and rhythm; No murmurs, rubs, or gallops  ABDOMEN: Soft, Nontender, Nondistended; Bowel sounds present  NEURO: Alert & Oriented X3  EXTREMITIES: No LE edema, no calf tenderness  LYMPH: No lymphadenopathy noted  SKIN: No rashes or lesions    Consultant(s) Notes Reviewed:  [x ] YES  [ ] NO  Care Discussed with Consultants/Other Providers [ x] YES  [ ] NO    LABS:                        11.3   11.12 )-----------( 161      ( 31 Jul 2024 09:44 )             33.6     07-31    138  |  105  |  22  ----------------------------<  115<H>  4.0   |  18<L>  |  1.27    Ca    9.1      31 Jul 2024 01:25  Phos  2.9     07-31  Mg     2.1     07-31    TPro  6.1  /  Alb  3.5  /  TBili  0.4  /  DBili  x   /  AST  19  /  ALT  13  /  AlkPhos  99  07-31      Urinalysis Basic - ( 31 Jul 2024 01:25 )    Color: x / Appearance: x / SG: x / pH: x  Gluc: 115 mg/dL / Ketone: x  / Bili: x / Urobili: x   Blood: x / Protein: x / Nitrite: x   Leuk Esterase: x / RBC: x / WBC x   Sq Epi: x / Non Sq Epi: x / Bacteria: x    Urinalysis Basic - ( 31 Jul 2024 01:25 )    Color: x / Appearance: x / SG: x / pH: x  Gluc: 115 mg/dL / Ketone: x  / Bili: x / Urobili: x   Blood: x / Protein: x / Nitrite: x   Leuk Esterase: x / RBC: x / WBC x   Sq Epi: x / Non Sq Epi: x / Bacteria: x        RADIOLOGY & ADDITIONAL TESTS:    Imaging Personally Reviewed:  [x ] YES  [ ] NO      Assessment and Plan:  89 yo M Estonian speaking with PMHx HTN, PAF (Eliquis, last dose 7/27), CHB s/p dual chamber PPM ([employer], implantation 2/2017), CAD s/p PCI, mixed restrictive and obstructive pulmonary disease, SESAR (not on CPAP), [place]; admit 7/30 with c/o fatigue/NEL[place], generator is [facility] s/p CRT upgrade c/b brief hypotensive post procedure s/p Mayur bolus, transfer to CICU for close monitoring.       NEURO  AOx3, no active issues.     PULM  #Hx SESAR; mixed restrictive and obstructive pulm disease  - c/w home Spiriva, Symbicort, c/w Duonebs   - Sp02 > 94% on RA  - Nocturnal CPAP      CARDIOVASCULAR  #CARMITA s/p CRT upgrade; CHB s/p dual chamber PPM ([employer], implantation 2/2017)  *s/p CRT upgrade c/b brief hypotension post procedure likely sedation related   *Mild swelling at CRT site concerning for hematoma, compression dressing intact/dry, H/H stable   *POCUS: no pericardial effusion   - Cefazolin x1 now  - f/u with EP about post-procedure abx ppx  - f/u PA/Lat CXR   - DC planning    #Hx Chronic Systolic HF, EF 27%  *TTE 12/2023: severely decreased LVSF (EF 27%), WMAs, Grade 1 DD, normal RVF  - GDMT: Lopressor 12.5 BID, [place] 25  - Euvolemic, target net even, strict I/Os, daily weights  - Hold home Toprol 50 iso hypotension     #Hx PAF  - c/w Lopressor 12.5 BID   - f/u EP reccs regarding re-starting Eliquis     #Hx CAD s/p PCI   - c/w Plavix 75  - c/w Atorvastatin 10   - f/u lipids    GI  - Diet: DASH     RENAL  No active issues, SCr 1.27, euvolemic   - strict I/Os, target net even     HEME  *H/H stable, active T&S  - Monitor H/H iso anterior chest wall hematoma   - DVT ppx: SCDs, resume DOAC per EP    ENDO  No active issues. bGs 80s-100s.   - f/u A1c, TFTs    ID  #Leukocytosis, mild   *Possible reactive, afebrile   - 1x Cefazolin now for post-procedure ppx MICU DOWN GRADE NOTE    Unit: [ ] Medicine [X] Telemetry     Accepting Physician:    Patient is a 89y old  Male who presents with a chief complaint of complete heart block s/p PPM, CRT upgrade (31 Jul 2024 08:04)      HPI: 89 year old male, (Albanian speaking, daughter at bedside, unable to hear well with , used daughter as ), with PMHx of, HTN, CAD s/p PCI w/HAYDER, mixed restrictive and obstructive pulmonary disease, SESAR (not on CPAP), [place],  PAF on Eliquis (on hold since saturday), CHB s/p dual chamber PPM ([employer]) implantation on 2/21/2017, who has developed progressive fatigue and dyspnea with ADL referred for CRT upgrade given his generator is at [facility]. Patient s/p CRT upgrade on 7/30 c/b brief hypotensive episode post procedure s/p Mayur bolus, transferred to CICU for close monitoring.       INTERVAL HPI/OVERNIGHT EVENTS:   - O/N: hypotension 75/40, given 250cc IVF bolus x2 w/o change --> Mayur x2. Has remained HDS.   - Appears to have L. anterior chest wall expanding hematoma on POCUS, pressure dressing applied, Hgb stable.     For Follow-Up:  [ ] monitor H/H and L. anterior chest hematoma  [ ] f/u EP reccs regarding when to resume Eliquis and post-procedure abx ppx   [ ] PT eval  [ ] discharge planning      Asessment & Plan  NEURO  AOx3, no active issues.     PULM  #Hx SESAR; mixed restrictive and obstructive pulm disease  - c/w home Spiriva, Symbicort, c/w Duonebs   - Sp02 > 94% on RA  - Nocturnal CPAP      CARDIOVASCULAR  #CARMITA s/p CRT upgrade; CHB s/p dual chamber PPM ([employer], implantation 2/2017)  *s/p CRT upgrade on 7/30 c/b brief hypotension post procedure, likely sedation-related   *Mild swelling and ecchymosis at CRT site concerning for hematoma, compression dressing intact/dry, H/H stable   *POCUS: no pericardial effusion   - Cefazolin x1 now  - f/u with EP about post-procedure abx ppx  - f/u PA/Lat CXR   - DC planning    #Hx Chronic Systolic HF, EF 27%  *TTE 12/2023: severely decreased LVSF (EF 27%), WMAs, Grade 1 DD, normal RVF  - GDMT: Lopressor 12.5 BID, [place] 25  - Euvolemic, target net even, strict I/Os, daily weights  - Hold home Toprol 50 iso hypotension     #Hx PAF  - c/w Lopressor 12.5 BID   - f/u EP reccs regarding re-starting Eliquis     #Hx CAD s/p PCI   - c/w Plavix 75  - c/w Atorvastatin 10   - f/u lipids    GI  - Diet: DASH     RENAL  No active issues, SCr 1.27, euvolemic   - strict I/Os, target net even     HEME  *H/H stable, active T&S  - Monitor H/H iso anterior chest wall hematoma   - DVT ppx: SCDs, resume DOAC per EP    ENDO  No active issues. bGs 80s-100s.   - f/u A1c, TFTs    ID  #Leukocytosis, mild   *Possible reactive, afebrile   - 1x Cefazolin now for post-procedure ppx        REVIEW OF SYSTEMS:  CONSTITUTIONAL: No fever, chills  HEENT:  No blurry vision No sinus or throat pain  NECK: No pain or stiffness  RESPIRATORY: No cough, wheezing, chills or hemoptysis; No shortness of breath  CARDIOVASCULAR: L. anterior chest incision pain + swelling. No palpitations or CP.   GASTROINTESTINAL: No abdominal pain. No nausea, vomiting, or diarrhea  GENITOURINARY: No dysuria  NEUROLOGICAL: No HA, No focal weakness  SKIN: No itching, burning, rashes, or lesions   MUSCULOSKELETAL: No joint pain or swelling; No muscle, back, or extremity pain    MEDICATIONS:  albuterol/ipratropium for Nebulization 3 milliLiter(s) Nebulizer every 6 hours  atorvastatin 10 milliGRAM(s) Oral at bedtime  budesonide  80 MICROgram(s)/formoterol 4.5 MICROgram(s) Inhaler 2 Puff(s) Inhalation two times a day  chlorhexidine 4% Liquid 1 Application(s) Topical once  clopidogrel Tablet 75 milliGRAM(s) Oral daily  finasteride 5 milliGRAM(s) Oral daily  metoprolol tartrate 12.5 milliGRAM(s) Oral two times a day  spironolactone 25 milliGRAM(s) Oral daily  tamsulosin 0.8 milliGRAM(s) Oral at bedtime  tiotropium 2.5 MICROgram(s) Inhaler 2 Puff(s) Inhalation daily      T(C): 36.8 (07-31-24 @ 11:03), Max: 36.9 (07-30-24 @ 20:50)  HR: 67 (07-31-24 @ 14:00) (56 - 79)  BP: 110/51 (07-31-24 @ 14:00) (65/28 - 149/65)  RR: 29 (07-31-24 @ 14:00) (11 - 29)  SpO2: 100% (07-31-24 @ 14:00) (92% - 100%)  Wt(kg): --Vital Signs Last 24 Hrs  T(C): 36.8 (31 Jul 2024 11:03), Max: 36.9 (30 Jul 2024 20:50)  T(F): 98.2 (31 Jul 2024 11:03), Max: 98.4 (30 Jul 2024 20:50)  HR: 67 (31 Jul 2024 14:00) (56 - 79)  BP: 110/51 (31 Jul 2024 14:00) (65/28 - 149/65)  BP(mean): 74 (31 Jul 2024 14:00) (39 - 100)  RR: 29 (31 Jul 2024 14:00) (11 - 29)  SpO2: 100% (31 Jul 2024 14:00) (92% - 100%)    Parameters below as of 31 Jul 2024 14:00  Patient On (Oxygen Delivery Method): room air        PHYSICAL EXAM:  GENERAL: NAD, well-groomed, well-developed  HEAD:  Atraumatic, Normocephalic  EYES: EOMI, PERRLA, conjunctiva and sclera clear  ENMT:  Moist mucous membranes  NECK: Supple, No JVD,  CHEST/LUNG: L. anterior chest incision with underlying hematoma, surrounding ecchymosis and erythema. Clear to auscultation bilaterally; No rales, rhonchi, wheezing, or rubs  HEART: Regular rate and rhythm; No murmurs, rubs, or gallops  ABDOMEN: Soft, Nontender, Nondistended; Bowel sounds present  NEURO: Alert & Oriented X3  EXTREMITIES: No LE edema, no calf tenderness  LYMPH: No lymphadenopathy noted  SKIN: No rashes or lesions    Consultant(s) Notes Reviewed:  [x ] YES  [ ] NO  Care Discussed with Consultants/Other Providers [ x] YES  [ ] NO    LABS:                        11.3   11.12 )-----------( 161      ( 31 Jul 2024 09:44 )             33.6     07-31    138  |  105  |  22  ----------------------------<  115<H>  4.0   |  18<L>  |  1.27    Ca    9.1      31 Jul 2024 01:25  Phos  2.9     07-31  Mg     2.1     07-31    TPro  6.1  /  Alb  3.5  /  TBili  0.4  /  DBili  x   /  AST  19  /  ALT  13  /  AlkPhos  99  07-31      Urinalysis Basic - ( 31 Jul 2024 01:25 )    Color: x / Appearance: x / SG: x / pH: x  Gluc: 115 mg/dL / Ketone: x  / Bili: x / Urobili: x   Blood: x / Protein: x / Nitrite: x   Leuk Esterase: x / RBC: x / WBC x   Sq Epi: x / Non Sq Epi: x / Bacteria: x    Urinalysis Basic - ( 31 Jul 2024 01:25 )    Color: x / Appearance: x / SG: x / pH: x  Gluc: 115 mg/dL / Ketone: x  / Bili: x / Urobili: x   Blood: x / Protein: x / Nitrite: x   Leuk Esterase: x / RBC: x / WBC x   Sq Epi: x / Non Sq Epi: x / Bacteria: x        RADIOLOGY & ADDITIONAL TESTS:    Imaging Personally Reviewed:  [x ] YES  [ ] NO MICU DOWN GRADE NOTE    Unit: [ ] Medicine [X] Telemetry     Accepting Physician:    Patient is a 89y old  Male who presents with a chief complaint of complete heart block s/p PPM, CRT upgrade (31 Jul 2024 08:04)      HPI: 89 year old male, (Amharic speaking, daughter at bedside, unable to hear well with , used daughter as ), with PMHx of, HTN, CAD s/p PCI w/HAYDER, mixed restrictive and obstructive pulmonary disease, SESAR (not on CPAP), [place],  PAF on Eliquis (on hold since saturday), CHB s/p dual chamber PPM ([employer]) implantation on 2/21/2017, who has developed progressive fatigue and dyspnea with ADL referred for CRT upgrade given his generator is at [facility]. Patient s/p CRT upgrade on 7/30 c/b brief hypotensive episode post procedure s/p Mayur bolus, transferred to CICU for close monitoring.       INTERVAL HPI/OVERNIGHT EVENTS:   - O/N: hypotension 75/40, given 250cc IVF bolus x2 w/o change --> Mayur x2. Has remained HDS.   - Appears to have L. anterior chest wall expanding hematoma on POCUS, pressure dressing applied, Hgb stable.     For Follow-Up:  [ ] monitor H/H and L. anterior chest hematoma  [ ] f/u EP reccs regarding when to resume Eliquis and post-procedure abx ppx   [ ] PT eval  [ ] discharge planning      Asessment & Plan  NEURO  AOx3, no active issues.     PULM  #Hx SESAR; mixed restrictive and obstructive pulm disease  - c/w home Spiriva, Symbicort, c/w Duonebs   - Sp02 > 94% on RA  - Nocturnal CPAP      CARDIOVASCULAR  #CARMITA s/p CRT upgrade; CHB s/p dual chamber PPM ([employer], implantation 2/2017)  *s/p CRT upgrade on 7/30 c/b brief hypotension post procedure, likely sedation-related   *Mild swelling and ecchymosis at CRT site concerning for hematoma, compression dressing intact/dry, H/H stable   *POCUS: no pericardial effusion   *PA/Lat CXR 7/31: s/p CRT upgrade with leads in appropriate position.  - Cefazolin x1 now  - f/u with EP about post-procedure abx ppx  - DC planning    #Hx Chronic Systolic HF, EF 27%  *TTE 12/2023: severely decreased LVSF (EF 27%), WMAs, Grade 1 DD, normal RVF  *TTE 7/31/2024: EF 30-35%, LV cavity is severely dilated  - GDMT: Lopressor 12.5 BID, [place] 25  - Euvolemic, target net even, strict I/Os, daily weights  - Hold home Toprol 50 iso hypotension     #Hx PAF  - c/w Lopressor 12.5 BID   - f/u EP reccs regarding re-starting Eliquis     #Hx CAD s/p PCI w/ HAYDER  *lipids: Chol 133, TGs 70, HDL 61, LDL 57  - c/w Plavix 75  - c/w Atorvastatin 10     GI  - Diet: DASH     RENAL  No active issues, SCr 1.27, euvolemic   - strict I/Os, target net even     HEME  *H/H stable, active T&S  - Monitor H/H iso anterior chest wall hematoma   - DVT ppx: SCDs, resume DOAC per EP    ENDO  No active issues. bGs 80s-100s.   *A1C 5.4  *TSH 1.51    ID  #Leukocytosis, mild   *Possible reactive, afebrile   - 1x Cefazolin now for post-procedure ppx        REVIEW OF SYSTEMS:  CONSTITUTIONAL: No fever, chills  HEENT:  No blurry vision No sinus or throat pain  NECK: No pain or stiffness  RESPIRATORY: No cough, wheezing, chills or hemoptysis; No shortness of breath  CARDIOVASCULAR: L. anterior chest incision pain + swelling. No palpitations or CP.   GASTROINTESTINAL: No abdominal pain. No nausea, vomiting, or diarrhea  GENITOURINARY: No dysuria  NEUROLOGICAL: No HA, No focal weakness  SKIN: No itching, burning, rashes, or lesions   MUSCULOSKELETAL: No joint pain or swelling; No muscle, back, or extremity pain    MEDICATIONS:  albuterol/ipratropium for Nebulization 3 milliLiter(s) Nebulizer every 6 hours  atorvastatin 10 milliGRAM(s) Oral at bedtime  budesonide  80 MICROgram(s)/formoterol 4.5 MICROgram(s) Inhaler 2 Puff(s) Inhalation two times a day  chlorhexidine 4% Liquid 1 Application(s) Topical once  clopidogrel Tablet 75 milliGRAM(s) Oral daily  finasteride 5 milliGRAM(s) Oral daily  metoprolol tartrate 12.5 milliGRAM(s) Oral two times a day  spironolactone 25 milliGRAM(s) Oral daily  tamsulosin 0.8 milliGRAM(s) Oral at bedtime  tiotropium 2.5 MICROgram(s) Inhaler 2 Puff(s) Inhalation daily      T(C): 36.8 (07-31-24 @ 11:03), Max: 36.9 (07-30-24 @ 20:50)  HR: 67 (07-31-24 @ 14:00) (56 - 79)  BP: 110/51 (07-31-24 @ 14:00) (65/28 - 149/65)  RR: 29 (07-31-24 @ 14:00) (11 - 29)  SpO2: 100% (07-31-24 @ 14:00) (92% - 100%)  Wt(kg): --Vital Signs Last 24 Hrs  T(C): 36.8 (31 Jul 2024 11:03), Max: 36.9 (30 Jul 2024 20:50)  T(F): 98.2 (31 Jul 2024 11:03), Max: 98.4 (30 Jul 2024 20:50)  HR: 67 (31 Jul 2024 14:00) (56 - 79)  BP: 110/51 (31 Jul 2024 14:00) (65/28 - 149/65)  BP(mean): 74 (31 Jul 2024 14:00) (39 - 100)  RR: 29 (31 Jul 2024 14:00) (11 - 29)  SpO2: 100% (31 Jul 2024 14:00) (92% - 100%)    Parameters below as of 31 Jul 2024 14:00  Patient On (Oxygen Delivery Method): room air        PHYSICAL EXAM:  GENERAL: NAD, well-groomed, well-developed  HEAD:  Atraumatic, Normocephalic  EYES: EOMI, PERRLA, conjunctiva and sclera clear  ENMT:  Moist mucous membranes  NECK: Supple, No JVD,  CHEST/LUNG: L. anterior chest incision with underlying hematoma, surrounding ecchymosis and erythema. Clear to auscultation bilaterally; No rales, rhonchi, wheezing, or rubs  HEART: Regular rate and rhythm; No murmurs, rubs, or gallops  ABDOMEN: Soft, Nontender, Nondistended; Bowel sounds present  NEURO: Alert & Oriented X3  EXTREMITIES: No LE edema, no calf tenderness  LYMPH: No lymphadenopathy noted  SKIN: No rashes or lesions    Consultant(s) Notes Reviewed:  [x ] YES  [ ] NO  Care Discussed with Consultants/Other Providers [ x] YES  [ ] NO    LABS:                        11.3   11.12 )-----------( 161      ( 31 Jul 2024 09:44 )             33.6     07-31    138  |  105  |  22  ----------------------------<  115<H>  4.0   |  18<L>  |  1.27    Ca    9.1      31 Jul 2024 01:25  Phos  2.9     07-31  Mg     2.1     07-31    TPro  6.1  /  Alb  3.5  /  TBili  0.4  /  DBili  x   /  AST  19  /  ALT  13  /  AlkPhos  99  07-31      Urinalysis Basic - ( 31 Jul 2024 01:25 )    Color: x / Appearance: x / SG: x / pH: x  Gluc: 115 mg/dL / Ketone: x  / Bili: x / Urobili: x   Blood: x / Protein: x / Nitrite: x   Leuk Esterase: x / RBC: x / WBC x   Sq Epi: x / Non Sq Epi: x / Bacteria: x    Urinalysis Basic - ( 31 Jul 2024 01:25 )    Color: x / Appearance: x / SG: x / pH: x  Gluc: 115 mg/dL / Ketone: x  / Bili: x / Urobili: x   Blood: x / Protein: x / Nitrite: x   Leuk Esterase: x / RBC: x / WBC x   Sq Epi: x / Non Sq Epi: x / Bacteria: x        RADIOLOGY & ADDITIONAL TESTS:    Imaging Personally Reviewed:  [x ] YES  [ ] NO MICU DOWN GRADE NOTE    Unit: [ ] Medicine [X] Telemetry     Accepting Physician: Dr. Lou Tilley    Hand-off given to:     Patient is a 89y old  Male who presents with a chief complaint of complete heart block s/p PPM, CRT upgrade (31 Jul 2024 08:04)      HPI: 89 year old male, (Serbian speaking, daughter at bedside, unable to hear well with , used daughter as ), with PMHx of, HTN, CAD s/p PCI w/HAYDER, mixed restrictive and obstructive pulmonary disease, SESAR (not on CPAP), [place],  PAF on Eliquis (on hold since saturday), CHB s/p dual chamber PPM ([employer]) implantation on 2/21/2017, who has developed progressive fatigue and dyspnea with ADL referred for CRT upgrade given his generator is at [facility]. Patient s/p CRT upgrade on 7/30 c/b brief hypotensive episode post procedure s/p Mayur bolus, transferred to CICU for close monitoring.       INTERVAL HPI/OVERNIGHT EVENTS:   - O/N: hypotension 75/40, given 250cc IVF bolus x2 w/o change --> Mayur x2. Has remained HDS.   - Appears to have L. anterior chest wall expanding hematoma on POCUS, pressure dressing applied, Hgb stable.     For Follow-Up:  [ ] monitor H/H and L. anterior chest hematoma  [ ] f/u EP reccs regarding when to resume Eliquis and post-procedure abx ppx   [ ] PT eval  [ ] discharge planning      Asessment & Plan  NEURO  AOx3, no active issues.     PULM  #Hx SESAR; mixed restrictive and obstructive pulm disease  - c/w home Spiriva, Symbicort, c/w Duonebs   - Sp02 > 94% on RA  - Nocturnal CPAP      CARDIOVASCULAR  #CARMITA s/p CRT upgrade; CHB s/p dual chamber PPM ([employer], implantation 2/2017)  *s/p CRT upgrade on 7/30 c/b brief hypotension post procedure, likely sedation-related   *Mild swelling and ecchymosis at CRT site concerning for hematoma, compression dressing intact/dry, H/H stable   *POCUS: no pericardial effusion   *PA/Lat CXR 7/31: s/p CRT upgrade with leads in appropriate position.  - Cefazolin x1 now  - f/u with EP about post-procedure abx ppx  - DC planning    #Hx Chronic Systolic HF, EF 27%  *TTE 12/2023: severely decreased LVSF (EF 27%), WMAs, Grade 1 DD, normal RVF  *TTE 7/31/2024: EF 30-35%, LV cavity is severely dilated  - GDMT: Lopressor 12.5 BID, [place] 25  - Euvolemic, target net even, strict I/Os, daily weights  - Hold home Toprol 50 iso hypotension     #Hx PAF  - c/w Lopressor 12.5 BID   - f/u EP reccs regarding re-starting Eliquis     #Hx CAD s/p PCI w/ HAYDER  *lipids: Chol 133, TGs 70, HDL 61, LDL 57  - c/w Plavix 75  - c/w Atorvastatin 10     GI  - Diet: DASH     RENAL  No active issues, SCr 1.27, euvolemic   - strict I/Os, target net even     HEME  *H/H stable, active T&S  - Monitor H/H iso anterior chest wall hematoma   - DVT ppx: SCDs, resume DOAC per EP    ENDO  No active issues. bGs 80s-100s.   *A1C 5.4  *TSH 1.51    ID  #Leukocytosis, mild   *Possible reactive, afebrile   - 1x Cefazolin now for post-procedure ppx        REVIEW OF SYSTEMS:  CONSTITUTIONAL: No fever, chills  HEENT:  No blurry vision No sinus or throat pain  NECK: No pain or stiffness  RESPIRATORY: No cough, wheezing, chills or hemoptysis; No shortness of breath  CARDIOVASCULAR: L. anterior chest incision pain + swelling. No palpitations or CP.   GASTROINTESTINAL: No abdominal pain. No nausea, vomiting, or diarrhea  GENITOURINARY: No dysuria  NEUROLOGICAL: No HA, No focal weakness  SKIN: No itching, burning, rashes, or lesions   MUSCULOSKELETAL: No joint pain or swelling; No muscle, back, or extremity pain    MEDICATIONS:  albuterol/ipratropium for Nebulization 3 milliLiter(s) Nebulizer every 6 hours  atorvastatin 10 milliGRAM(s) Oral at bedtime  budesonide  80 MICROgram(s)/formoterol 4.5 MICROgram(s) Inhaler 2 Puff(s) Inhalation two times a day  chlorhexidine 4% Liquid 1 Application(s) Topical once  clopidogrel Tablet 75 milliGRAM(s) Oral daily  finasteride 5 milliGRAM(s) Oral daily  metoprolol tartrate 12.5 milliGRAM(s) Oral two times a day  spironolactone 25 milliGRAM(s) Oral daily  tamsulosin 0.8 milliGRAM(s) Oral at bedtime  tiotropium 2.5 MICROgram(s) Inhaler 2 Puff(s) Inhalation daily      T(C): 36.8 (07-31-24 @ 11:03), Max: 36.9 (07-30-24 @ 20:50)  HR: 67 (07-31-24 @ 14:00) (56 - 79)  BP: 110/51 (07-31-24 @ 14:00) (65/28 - 149/65)  RR: 29 (07-31-24 @ 14:00) (11 - 29)  SpO2: 100% (07-31-24 @ 14:00) (92% - 100%)  Wt(kg): --Vital Signs Last 24 Hrs  T(C): 36.8 (31 Jul 2024 11:03), Max: 36.9 (30 Jul 2024 20:50)  T(F): 98.2 (31 Jul 2024 11:03), Max: 98.4 (30 Jul 2024 20:50)  HR: 67 (31 Jul 2024 14:00) (56 - 79)  BP: 110/51 (31 Jul 2024 14:00) (65/28 - 149/65)  BP(mean): 74 (31 Jul 2024 14:00) (39 - 100)  RR: 29 (31 Jul 2024 14:00) (11 - 29)  SpO2: 100% (31 Jul 2024 14:00) (92% - 100%)    Parameters below as of 31 Jul 2024 14:00  Patient On (Oxygen Delivery Method): room air        PHYSICAL EXAM:  GENERAL: NAD, well-groomed, well-developed  HEAD:  Atraumatic, Normocephalic  EYES: EOMI, PERRLA, conjunctiva and sclera clear  ENMT:  Moist mucous membranes  NECK: Supple, No JVD,  CHEST/LUNG: L. anterior chest incision with underlying hematoma, surrounding ecchymosis and erythema. Clear to auscultation bilaterally; No rales, rhonchi, wheezing, or rubs  HEART: Regular rate and rhythm; No murmurs, rubs, or gallops  ABDOMEN: Soft, Nontender, Nondistended; Bowel sounds present  NEURO: Alert & Oriented X3  EXTREMITIES: No LE edema, no calf tenderness  LYMPH: No lymphadenopathy noted  SKIN: No rashes or lesions    Consultant(s) Notes Reviewed:  [x ] YES  [ ] NO  Care Discussed with Consultants/Other Providers [ x] YES  [ ] NO    LABS:                        11.3   11.12 )-----------( 161      ( 31 Jul 2024 09:44 )             33.6     07-31    138  |  105  |  22  ----------------------------<  115<H>  4.0   |  18<L>  |  1.27    Ca    9.1      31 Jul 2024 01:25  Phos  2.9     07-31  Mg     2.1     07-31    TPro  6.1  /  Alb  3.5  /  TBili  0.4  /  DBili  x   /  AST  19  /  ALT  13  /  AlkPhos  99  07-31      Urinalysis Basic - ( 31 Jul 2024 01:25 )    Color: x / Appearance: x / SG: x / pH: x  Gluc: 115 mg/dL / Ketone: x  / Bili: x / Urobili: x   Blood: x / Protein: x / Nitrite: x   Leuk Esterase: x / RBC: x / WBC x   Sq Epi: x / Non Sq Epi: x / Bacteria: x    Urinalysis Basic - ( 31 Jul 2024 01:25 )    Color: x / Appearance: x / SG: x / pH: x  Gluc: 115 mg/dL / Ketone: x  / Bili: x / Urobili: x   Blood: x / Protein: x / Nitrite: x   Leuk Esterase: x / RBC: x / WBC x   Sq Epi: x / Non Sq Epi: x / Bacteria: x        RADIOLOGY & ADDITIONAL TESTS:    Imaging Personally Reviewed:  [x ] YES  [ ] NO

## 2024-07-31 NOTE — PROGRESS NOTE ADULT - ATTENDING COMMENTS
89yo M with HTN pAF CHB with prior dual chamber PPM admitted after hypotension from CRT upgrade.    Neuro no issues  Pulm cont COPD home inhalers  CV no e/o pericardial effusion, does have possible pocket hematoma. Asymptomatic. SBPs in 70s when sleeping which were fluid responsive. Resumed lopressor and BPs good. Unlikely to be from bleed (small) more likely residual sedation. Cont plavix and timing of eliquis per EP  Renal Cr stable at 1.3  GI DASH diet  ID rec'd ancef, abx to be determined by EP  Heme holding eliquis  Endo BG controlled  Lines no central access

## 2024-07-31 NOTE — PROGRESS NOTE ADULT - SUBJECTIVE AND OBJECTIVE BOX
Patient is a 89y old  Male who presents with a chief complaint of dyspnea x1 week    HPI:  89 year old male, (Albanian speaking, daughter at bedside, unable to hear well with , used daughter as ), with PMHx of, HTN, CAD s/p PCI w/HAYDER, mixed restrictive and obstructive pulmonary disease, SESAR (not on CPAP), [place],  PAF on Eliquis ( on hold since saturday), CHB s/p dual chamber( [employer]) PPM implantation on 2017, who has developed progressive fatigue and dyspnea with ADL referred  for possible CRT upgrade given his generator is at [facility].  Card: Dr. Burkett  ECHO(2023): LVEF 27%, severely decreased systolic function. Abnormal (paradoxical) septal motion consistent with RV pacemaker. Mild (grade I) LV diastolic dysfunction. The apex is dyskinetic. The apical septal segment and apical lateral segment are akinetic. The entire anterior wall, entire inferior wall, basal and mid anterolateral wall, basal and mid anterior septum, basal and mid inferior septum, and basal and mid inferolateral wall are hypokinetic. LA mildly dilated (JB 21.98). Mild AR/TR. Moderate MR. Mild pHTN.  NUCLEAR STRESS TEST(2023): Large sized moderate to severe, fixed defect in the inferoapical, apical lateral walls consistent with infarct. There is a medium size moderate, fixed defect in the inferior wall consistent with infarct. There is a small sized, mild to moderate fixed defect in the mid to basal anteroseptum consistent with infarct. No evidence of ischemia. (2024 10:07)       INTERVAL HPI/OVERNIGHT EVENTS:   No overnight events   Afebrile, hemodynamically stable     Subjective:    ICU Vital Signs Last 24 Hrs  T(C): 36.5 (2024 03:00), Max: 36.9 (2024 20:50)  T(F): 97.7 (2024 03:00), Max: 98.4 (2024 20:50)  HR: 70 (2024 07:30) (56 - 79)  BP: 135/67 (2024 07:30) (65/28 - 149/64)  BP(mean): 93 (2024 07:30) (39 - 100)  ABP: --  ABP(mean): --  RR: 20 (2024 07:30) (11 - 24)  SpO2: 95% (2024 07:30) (92% - 100%)    O2 Parameters below as of 2024 07:30  Patient On (Oxygen Delivery Method): room air          I&O's Summary    2024 07:01  -  2024 07:00  --------------------------------------------------------  IN: 620 mL / OUT: 550 mL / NET: 70 mL          Daily Height in cm: 165.1 (2024 15:02)    Daily Weight in k.1 (2024 10:07)    Adult Advanced Hemodynamics Last 24 Hrs  CVP(mm Hg): --  CVP(cm H2O): --  CO: --  CI: --  PA: --  PA(mean): --  PCWP: --  SVR: --  SVRI: --  PVR: --  PVRI: --    EKG/Telemetry Events:    MEDICATIONS  (STANDING):  albuterol/ipratropium for Nebulization 3 milliLiter(s) Nebulizer every 6 hours  atorvastatin 10 milliGRAM(s) Oral at bedtime  budesonide  80 MICROgram(s)/formoterol 4.5 MICROgram(s) Inhaler 2 Puff(s) Inhalation two times a day  chlorhexidine 4% Liquid 1 Application(s) Topical once  clopidogrel Tablet 75 milliGRAM(s) Oral daily  finasteride 5 milliGRAM(s) Oral daily  metoprolol tartrate 12.5 milliGRAM(s) Oral two times a day  spironolactone 25 milliGRAM(s) Oral daily  tamsulosin 0.8 milliGRAM(s) Oral at bedtime  tiotropium 2.5 MICROgram(s) Inhaler 2 Puff(s) Inhalation daily    MEDICATIONS  (PRN):      Physical Exam    GENERAL: NAD, non-toxic appearing   HEAD: Atraumatic, normocephalic  EYES: EOMI, PERRLA, conjunctiva and sclera clear  NECK: Supple, no JVD, normal thyroid, no LAD  NEURO: AOx3, no FNDs  LUNGS: Clear to auscultation biltaerally. Good air entry. No rales, rhonchi, or wheezing.    HEART: Regular rate and rhythm. S1S2 normal, no S3. No mumur, rubs, gallps.   ABDOMEN: Soft, nontender, nondistended. Bowel sounds present.  EXTREMITIES: 2+ peripheral pulses. No edema, clubbing, or cyanosis.   LYMPH: No lymphadenopathy noted  SKIN: No rashes or lesions    LABS:                        12.2   11.47 )-----------( 167      ( 2024 01:25 )             37.9     -    138  |  105  |  22  ----------------------------<  115<H>  4.0   |  18<L>  |  1.27    Ca    9.1      2024 01:25  Phos  2.9     -  Mg     2.1         TPro  6.1  /  Alb  3.5  /  TBili  0.4  /  DBili  x   /  AST  19  /  ALT  13  /  AlkPhos  99  -    LIVER FUNCTIONS - ( 2024 01:25 )  Alb: 3.5 g/dL / Pro: 6.1 g/dL / ALK PHOS: 99 U/L / ALT: 13 U/L / AST: 19 U/L / GGT: x             CAPILLARY BLOOD GLUCOSE                Urinalysis Basic - ( 2024 01:25 )    Color: x / Appearance: x / SG: x / pH: x  Gluc: 115 mg/dL / Ketone: x  / Bili: x / Urobili: x   Blood: x / Protein: x / Nitrite: x   Leuk Esterase: x / RBC: x / WBC x   Sq Epi: x / Non Sq Epi: x / Bacteria: x          RADIOLOGY & ADDITIONAL TESTS:  CXR:        Care Discussed with Consultants/Other Providers [ x] YES  [ ] NO           Patient is a 89y old  Male who presents with a chief complaint of dyspnea x1 week    HPI:  89 year old male, (Cape Verdean speaking, daughter at bedside, unable to hear well with , used daughter as ), with PMHx of, HTN, CAD s/p PCI w/HAYDER, mixed restrictive and obstructive pulmonary disease, SESAR (not on CPAP), [place],  PAF on Eliquis ( on hold since saturday), CHB s/p dual chamber( [employer]) PPM implantation on 2017, who has developed progressive fatigue and dyspnea with ADL referred  for possible CRT upgrade given his generator is at [facility].         INTERVAL HPI/OVERNIGHT EVENTS:   O/N: hypotension 75/40, given 250cc IVF bolus x2 w/o change --> Mayur x2. Also appears to have L. anterior chest wall expanding hematoma, pressure dressing applied, Hgb stable.   Afebrile, hemodynamically stable     Subjective: Patient overall feeling well. Endorsing superficial L. anterior chest pain at site of incision, improved with tylenol and ice packs. Denies SOB, fever, chills, N/V/D.     ICU Vital Signs Last 24 Hrs  T(C): 36.5 (2024 03:00), Max: 36.9 (2024 20:50)  T(F): 97.7 (2024 03:00), Max: 98.4 (2024 20:50)  HR: 70 (2024 07:30) (56 - 79)  BP: 135/67 (2024 07:30) (65/28 - 149/64)  BP(mean): 93 (2024 07:30) (39 - 100)  ABP: --  ABP(mean): --  RR: 20 (2024 07:30) (11 - 24)  SpO2: 95% (2024 07:30) (92% - 100%)    O2 Parameters below as of 2024 07:30  Patient On (Oxygen Delivery Method): room air          I&O's Summary    2024 07:01  -  2024 07:00  --------------------------------------------------------  IN: 620 mL / OUT: 550 mL / NET: 70 mL          Daily Height in cm: 165.1 (2024 15:02)    Daily Weight in k.1 (2024 10:07)      EKG/Telemetry Events:    MEDICATIONS  (STANDING):  albuterol/ipratropium for Nebulization 3 milliLiter(s) Nebulizer every 6 hours  atorvastatin 10 milliGRAM(s) Oral at bedtime  budesonide  80 MICROgram(s)/formoterol 4.5 MICROgram(s) Inhaler 2 Puff(s) Inhalation two times a day  chlorhexidine 4% Liquid 1 Application(s) Topical once  clopidogrel Tablet 75 milliGRAM(s) Oral daily  finasteride 5 milliGRAM(s) Oral daily  metoprolol tartrate 12.5 milliGRAM(s) Oral two times a day  spironolactone 25 milliGRAM(s) Oral daily  tamsulosin 0.8 milliGRAM(s) Oral at bedtime  tiotropium 2.5 MICROgram(s) Inhaler 2 Puff(s) Inhalation daily    MEDICATIONS  (PRN):      Physical Exam    GENERAL: NAD, non-toxic appearing   HEAD: Atraumatic, normocephalic  EYES: EOMI, PERRLA, conjunctiva and sclera clear  NECK: Supple, no JVD, normal thyroid, no LAD  NEURO: AOx3, no FNDs  LUNGS: L. anterior chest incision with underlying hematoma, surrounding ecchymosis and erythema. CTA B/L. Good air entry. No rales, rhonchi, or wheezing.    HEART: Regular rate and rhythm. S1S2 normal, no S3. No mumur, rubs, gallps.   ABDOMEN: Soft, nontender, nondistended. Bowel sounds present.  EXTREMITIES: 2+ peripheral pulses. No edema, clubbing, or cyanosis.   LYMPH: No lymphadenopathy noted  SKIN: No rashes or lesions    LABS:                        12.2   11.47 )-----------( 167      ( 2024 01:25 )             37.9     -    138  |  105  |  22  ----------------------------<  115<H>  4.0   |  18<L>  |  1.27    Ca    9.1      2024 01:25  Phos  2.9     -  Mg     2.1     -    TPro  6.1  /  Alb  3.5  /  TBili  0.4  /  DBili  x   /  AST  19  /  ALT  13  /  AlkPhos  99  -    LIVER FUNCTIONS - ( 2024 01:25 )  Alb: 3.5 g/dL / Pro: 6.1 g/dL / ALK PHOS: 99 U/L / ALT: 13 U/L / AST: 19 U/L / GGT: x             CAPILLARY BLOOD GLUCOSE                Urinalysis Basic - ( 2024 01:25 )    Color: x / Appearance: x / SG: x / pH: x  Gluc: 115 mg/dL / Ketone: x  / Bili: x / Urobili: x   Blood: x / Protein: x / Nitrite: x   Leuk Esterase: x / RBC: x / WBC x   Sq Epi: x / Non Sq Epi: x / Bacteria: x          RADIOLOGY & ADDITIONAL TESTS:  CXR:        Care Discussed with Consultants/Other Providers [ x] YES  [ ] NO           Patient is a 89y old  Male who presents with a chief complaint of dyspnea x1 week    HPI:  89 year old male, (Vietnamese speaking, daughter at bedside, unable to hear well with , used daughter as ), with PMHx of, HTN, CAD s/p PCI w/HAYDER, mixed restrictive and obstructive pulmonary disease, SESAR (not on CPAP), [place],  PAF on Eliquis ( on hold since saturday), CHB s/p dual chamber( [employer] PPM implantation on 2017, who has developed progressive fatigue and dyspnea with ADL referred  for possible CRT upgrade given his generator is at [facility].         INTERVAL HPI/OVERNIGHT EVENTS:   O/N: hypotension 75/40, given 250cc IVF bolus x2 w/o change --> Mayur x2. Also appears to have L. anterior chest wall expanding hematoma, pressure dressing applied, Hgb stable.   Afebrile, hemodynamically stable   Tele: A-paced, +PVCs    Subjective: Patient overall feeling well. Endorsing superficial L. anterior chest pain at site of incision, improved with tylenol and ice packs. Denies SOB, fever, chills, N/V/D.     ICU Vital Signs Last 24 Hrs  T(C): 36.5 (2024 03:00), Max: 36.9 (2024 20:50)  T(F): 97.7 (2024 03:00), Max: 98.4 (2024 20:50)  HR: 70 (2024 07:30) (56 - 79)  BP: 135/67 (2024 07:30) (65/28 - 149/64)  BP(mean): 93 (2024 07:30) (39 - 100)  ABP: --  ABP(mean): --  RR: 20 (2024 07:30) (11 - 24)  SpO2: 95% (2024 07:30) (92% - 100%)    O2 Parameters below as of 2024 07:30  Patient On (Oxygen Delivery Method): room air          I&O's Summary    2024 07:01  -  2024 07:00  --------------------------------------------------------  IN: 620 mL / OUT: 550 mL / NET: 70 mL          Daily Height in cm: 165.1 (2024 15:02)    Daily Weight in k.1 (2024 10:07)      EKG/Telemetry Events:    MEDICATIONS  (STANDING):  albuterol/ipratropium for Nebulization 3 milliLiter(s) Nebulizer every 6 hours  atorvastatin 10 milliGRAM(s) Oral at bedtime  budesonide  80 MICROgram(s)/formoterol 4.5 MICROgram(s) Inhaler 2 Puff(s) Inhalation two times a day  chlorhexidine 4% Liquid 1 Application(s) Topical once  clopidogrel Tablet 75 milliGRAM(s) Oral daily  finasteride 5 milliGRAM(s) Oral daily  metoprolol tartrate 12.5 milliGRAM(s) Oral two times a day  spironolactone 25 milliGRAM(s) Oral daily  tamsulosin 0.8 milliGRAM(s) Oral at bedtime  tiotropium 2.5 MICROgram(s) Inhaler 2 Puff(s) Inhalation daily    MEDICATIONS  (PRN):      Physical Exam    GENERAL: NAD, non-toxic appearing   HEAD: Atraumatic, normocephalic  EYES: EOMI, PERRLA, conjunctiva and sclera clear  NECK: Supple, no JVD, normal thyroid, no LAD  NEURO: AOx3, no FNDs  LUNGS: L. anterior chest incision with underlying hematoma, surrounding ecchymosis and erythema. CTA B/L. Good air entry. No rales, rhonchi, or wheezing.    HEART: Regular rate and rhythm. S1S2 normal, no S3. No mumur, rubs, gallps.   ABDOMEN: Soft, nontender, nondistended. Bowel sounds present.  EXTREMITIES: 2+ peripheral pulses. No edema, clubbing, or cyanosis.   LYMPH: No lymphadenopathy noted  SKIN: No rashes or lesions    LABS:                        12.2   11.47 )-----------( 167      ( 2024 01:25 )             37.9     07-    138  |  105  |  22  ----------------------------<  115<H>  4.0   |  18<L>  |  1.27    Ca    9.1      2024 01:25  Phos  2.9     -  Mg     2.1         TPro  6.1  /  Alb  3.5  /  TBili  0.4  /  DBili  x   /  AST  19  /  ALT  13  /  AlkPhos  99      LIVER FUNCTIONS - ( 2024 01:25 )  Alb: 3.5 g/dL / Pro: 6.1 g/dL / ALK PHOS: 99 U/L / ALT: 13 U/L / AST: 19 U/L / GGT: x             CAPILLARY BLOOD GLUCOSE                Urinalysis Basic - ( 2024 01:25 )    Color: x / Appearance: x / SG: x / pH: x  Gluc: 115 mg/dL / Ketone: x  / Bili: x / Urobili: x   Blood: x / Protein: x / Nitrite: x   Leuk Esterase: x / RBC: x / WBC x   Sq Epi: x / Non Sq Epi: x / Bacteria: x          RADIOLOGY & ADDITIONAL TESTS:  CXR:        Care Discussed with Consultants/Other Providers [ x] YES  [ ] NO

## 2024-07-31 NOTE — PROGRESS NOTE ADULT - ASSESSMENT
89 year old M (Swedish speaking, daughter at bedside, unable to hear well with , uses daughter as ), with PMHx of HTN, CAD s/p PCI w/HAYDER, mixed restrictive and obstructive pulmonary disease, SESAR (not on CPAP), [place], pAF on Eliquis (on hold since 7/28), CHB s/p dual chamber([employer] PPM implantation on 2/21/2017, who has developed progressive fatigue and dyspnea with ADL referred  for possible CRT upgrade given his generator is at [facility]. He is s/p upgrade to CRT-P 7/30, with hypotension overnight that was responsive to IVF bolus, upgraded to CICU. No pericardial effusion noted on TTE.    1) CHB s/p [employer] dual ch PPM, s/p upgrade 7/30    - S/p upgrade to CRT-P on 7/30, post procedure CXR revealing leads in appropriate position  - Pocket press pressure dressing applied this AM per Dr. Koroma  - Continue to hold chemical DVT prophylaxis. Patient on Eliquis pre op, EP to guide resuming AC. Hgb/Hct checked this AM for incr tenderness and swelling at site, 11/33 (12/37.9 yesterday). He received IV fluids overnight for hypotension which has since resolved, post procedure TTE negative for effusion   - Normal device check by ci rep, ID card given, patient has remote monitor at home. Post procedure device teaching performed, SARAH 8/12 3:40PM 89 year old M (Kazakh speaking, daughter at bedside, unable to hear well with , uses daughter as ), with PMHx of HTN, CAD s/p PCI w/HAYDER, mixed restrictive and obstructive pulmonary disease, SESAR (not on CPAP), [place], pAF on Eliquis (on hold since 7/28), CHB s/p dual chamber([employer] PPM implantation on 2/21/2017, who has developed progressive fatigue and dyspnea with ADL referred  for possible CRT upgrade given his generator is at [facility]. He is s/p upgrade to CRT-P 7/30, with hypotension overnight that was responsive to IVF bolus, upgraded to CICU. No pericardial effusion noted on TTE.    1) CHB s/p [employer]SC[employer] dual ch PPM, s/p upgrade 7/30    - S/p upgrade to CRT-P on 7/30, post procedure CXR revealing leads in appropriate position  - Pocket press pressure dressing applied this AM per Dr. Koroma  - Continue to hold chemical DVT prophylaxis. Patient on Eliquis pre op, EP to guide resuming AC. Hgb/Hct checked this AM for incr tenderness and swelling at site, 11/33 (12/37.9 yesterday). He received IV fluids overnight for hypotension which has since resolved, post procedure TTE negative for effusion   - Normal device check by [facility] rep, ID card given, patient has remote monitor at home. Post procedure device teaching performed, SARAH 8/12 3:40PM  - Leave pocket press on overnight and will assess wound in AM, EP will continue to follow

## 2024-08-01 ENCOUNTER — TRANSCRIPTION ENCOUNTER (OUTPATIENT)
Age: 89
End: 2024-08-01

## 2024-08-01 VITALS — DIASTOLIC BLOOD PRESSURE: 55 MMHG | HEART RATE: 69 BPM | SYSTOLIC BLOOD PRESSURE: 96 MMHG

## 2024-08-01 LAB
ALBUMIN SERPL ELPH-MCNC: 3.5 G/DL — SIGNIFICANT CHANGE UP (ref 3.3–5)
ALP SERPL-CCNC: 91 U/L — SIGNIFICANT CHANGE UP (ref 40–120)
ALT FLD-CCNC: 8 U/L — LOW (ref 10–45)
ANION GAP SERPL CALC-SCNC: 12 MMOL/L — SIGNIFICANT CHANGE UP (ref 5–17)
AST SERPL-CCNC: 20 U/L — SIGNIFICANT CHANGE UP (ref 10–40)
BILIRUB SERPL-MCNC: 0.7 MG/DL — SIGNIFICANT CHANGE UP (ref 0.2–1.2)
BLD GP AB SCN SERPL QL: NEGATIVE — SIGNIFICANT CHANGE UP
BUN SERPL-MCNC: 16 MG/DL — SIGNIFICANT CHANGE UP (ref 7–23)
CALCIUM SERPL-MCNC: 9.3 MG/DL — SIGNIFICANT CHANGE UP (ref 8.4–10.5)
CHLORIDE SERPL-SCNC: 101 MMOL/L — SIGNIFICANT CHANGE UP (ref 96–108)
CO2 SERPL-SCNC: 20 MMOL/L — LOW (ref 22–31)
CREAT SERPL-MCNC: 1 MG/DL — SIGNIFICANT CHANGE UP (ref 0.5–1.3)
EGFR: 70 ML/MIN/1.73M2 — SIGNIFICANT CHANGE UP
GLUCOSE SERPL-MCNC: 104 MG/DL — HIGH (ref 70–99)
HCT VFR BLD CALC: 34.5 % — LOW (ref 39–50)
HGB BLD-MCNC: 11.3 G/DL — LOW (ref 13–17)
MAGNESIUM SERPL-MCNC: 2 MG/DL — SIGNIFICANT CHANGE UP (ref 1.6–2.6)
MCHC RBC-ENTMCNC: 30.8 PG — SIGNIFICANT CHANGE UP (ref 27–34)
MCHC RBC-ENTMCNC: 32.8 GM/DL — SIGNIFICANT CHANGE UP (ref 32–36)
MCV RBC AUTO: 94 FL — SIGNIFICANT CHANGE UP (ref 80–100)
NRBC # BLD: 0 /100 WBCS — SIGNIFICANT CHANGE UP (ref 0–0)
PHOSPHATE SERPL-MCNC: 2.6 MG/DL — SIGNIFICANT CHANGE UP (ref 2.5–4.5)
PLATELET # BLD AUTO: 177 K/UL — SIGNIFICANT CHANGE UP (ref 150–400)
POTASSIUM SERPL-MCNC: 4.4 MMOL/L — SIGNIFICANT CHANGE UP (ref 3.5–5.3)
POTASSIUM SERPL-SCNC: 4.4 MMOL/L — SIGNIFICANT CHANGE UP (ref 3.5–5.3)
PROT SERPL-MCNC: 6.1 G/DL — SIGNIFICANT CHANGE UP (ref 6–8.3)
RBC # BLD: 3.67 M/UL — LOW (ref 4.2–5.8)
RBC # FLD: 14.9 % — HIGH (ref 10.3–14.5)
RH IG SCN BLD-IMP: POSITIVE — SIGNIFICANT CHANGE UP
SODIUM SERPL-SCNC: 133 MMOL/L — LOW (ref 135–145)
WBC # BLD: 11.27 K/UL — HIGH (ref 3.8–10.5)
WBC # FLD AUTO: 11.27 K/UL — HIGH (ref 3.8–10.5)

## 2024-08-01 PROCEDURE — 99233 SBSQ HOSP IP/OBS HIGH 50: CPT

## 2024-08-01 PROCEDURE — 99232 SBSQ HOSP IP/OBS MODERATE 35: CPT

## 2024-08-01 PROCEDURE — 71045 X-RAY EXAM CHEST 1 VIEW: CPT

## 2024-08-01 PROCEDURE — 93005 ELECTROCARDIOGRAM TRACING: CPT

## 2024-08-01 PROCEDURE — 33229 REMV&REPLC PM GEN MULT LEADS: CPT

## 2024-08-01 PROCEDURE — 71046 X-RAY EXAM CHEST 2 VIEWS: CPT

## 2024-08-01 PROCEDURE — 85027 COMPLETE CBC AUTOMATED: CPT

## 2024-08-01 PROCEDURE — C1894: CPT

## 2024-08-01 PROCEDURE — 33233 REMOVAL OF PM GENERATOR: CPT

## 2024-08-01 PROCEDURE — 86850 RBC ANTIBODY SCREEN: CPT

## 2024-08-01 PROCEDURE — 86901 BLOOD TYPING SEROLOGIC RH(D): CPT

## 2024-08-01 PROCEDURE — 33225 L VENTRIC PACING LEAD ADD-ON: CPT

## 2024-08-01 PROCEDURE — 93321 DOPPLER ECHO F-UP/LMTD STD: CPT

## 2024-08-01 PROCEDURE — 36415 COLL VENOUS BLD VENIPUNCTURE: CPT

## 2024-08-01 PROCEDURE — 80061 LIPID PANEL: CPT

## 2024-08-01 PROCEDURE — C1889: CPT

## 2024-08-01 PROCEDURE — 97161 PT EVAL LOW COMPLEX 20 MIN: CPT

## 2024-08-01 PROCEDURE — 86900 BLOOD TYPING SEROLOGIC ABO: CPT

## 2024-08-01 PROCEDURE — C2621: CPT

## 2024-08-01 PROCEDURE — C1887: CPT

## 2024-08-01 PROCEDURE — 83735 ASSAY OF MAGNESIUM: CPT

## 2024-08-01 PROCEDURE — 84100 ASSAY OF PHOSPHORUS: CPT

## 2024-08-01 PROCEDURE — C1769: CPT

## 2024-08-01 PROCEDURE — 83036 HEMOGLOBIN GLYCOSYLATED A1C: CPT

## 2024-08-01 PROCEDURE — 84443 ASSAY THYROID STIM HORMONE: CPT

## 2024-08-01 PROCEDURE — 94640 AIRWAY INHALATION TREATMENT: CPT

## 2024-08-01 PROCEDURE — 93308 TTE F-UP OR LMTD: CPT

## 2024-08-01 PROCEDURE — 83605 ASSAY OF LACTIC ACID: CPT

## 2024-08-01 PROCEDURE — 80048 BASIC METABOLIC PNL TOTAL CA: CPT

## 2024-08-01 PROCEDURE — C1892: CPT

## 2024-08-01 PROCEDURE — 80053 COMPREHEN METABOLIC PANEL: CPT

## 2024-08-01 PROCEDURE — C1900: CPT

## 2024-08-01 RX ORDER — IPRATROPIUM BROMIDE AND ALBUTEROL SULFATE 2.5; .5 MG/3ML; MG/3ML
3 SOLUTION RESPIRATORY (INHALATION)
Qty: 0 | Refills: 0 | DISCHARGE
Start: 2024-08-01

## 2024-08-01 RX ORDER — NITROGLYCERIN 400 UG/1
1 AEROSOL, METERED SUBLINGUAL
Qty: 10 | Refills: 0
Start: 2024-08-01

## 2024-08-01 RX ORDER — ACETAMINOPHEN 500 MG
1000 TABLET ORAL ONCE
Refills: 0 | Status: COMPLETED | OUTPATIENT
Start: 2024-08-01 | End: 2024-08-01

## 2024-08-01 RX ORDER — METOPROLOL TARTRATE 100 MG
12.5 TABLET ORAL ONCE
Refills: 0 | Status: COMPLETED | OUTPATIENT
Start: 2024-08-01 | End: 2024-08-01

## 2024-08-01 RX ORDER — APIXABAN 5 MG/1
1 TABLET, FILM COATED ORAL
Refills: 0 | DISCHARGE

## 2024-08-01 RX ORDER — CLOPIDOGREL BISULFATE 75 MG/1
1 TABLET, FILM COATED ORAL
Qty: 0 | Refills: 0 | DISCHARGE
Start: 2024-08-01

## 2024-08-01 RX ORDER — ATORVASTATIN CALCIUM 40 MG/1
1 TABLET, FILM COATED ORAL
Qty: 0 | Refills: 0 | DISCHARGE
Start: 2024-08-01

## 2024-08-01 RX ORDER — SPIRONOLACTONE 50 MG/1
1 TABLET, FILM COATED ORAL
Qty: 30 | Refills: 0
Start: 2024-08-01

## 2024-08-01 RX ORDER — ACETAMINOPHEN 500 MG
650 TABLET ORAL EVERY 6 HOURS
Refills: 0 | Status: DISCONTINUED | OUTPATIENT
Start: 2024-08-01 | End: 2024-08-01

## 2024-08-01 RX ORDER — SPIRONOLACTONE 50 MG/1
1 TABLET, FILM COATED ORAL
Qty: 0 | Refills: 0 | DISCHARGE
Start: 2024-08-01

## 2024-08-01 RX ORDER — FINASTERIDE 5 MG/1
1 TABLET, FILM COATED ORAL
Qty: 0 | Refills: 0 | DISCHARGE
Start: 2024-08-01

## 2024-08-01 RX ORDER — NITROGLYCERIN 400 UG/1
1 AEROSOL, METERED SUBLINGUAL
Qty: 0 | Refills: 0 | DISCHARGE

## 2024-08-01 RX ORDER — TIOTROPIUM BROMIDE 18 UG/1
2 CAPSULE ORAL; RESPIRATORY (INHALATION)
Qty: 0 | Refills: 0 | DISCHARGE
Start: 2024-08-01

## 2024-08-01 RX ORDER — ACETAMINOPHEN 500 MG
1000 TABLET ORAL ONCE
Refills: 0 | Status: DISCONTINUED | OUTPATIENT
Start: 2024-08-01 | End: 2024-08-01

## 2024-08-01 RX ORDER — METOPROLOL TARTRATE 100 MG
1 TABLET ORAL
Qty: 60 | Refills: 0
Start: 2024-08-01

## 2024-08-01 RX ADMIN — BUDESONIDE AND FORMOTEROL FUMARATE DIHYDRATE 2 PUFF(S): 80; 4.5 AEROSOL RESPIRATORY (INHALATION) at 05:43

## 2024-08-01 RX ADMIN — Medication 12.5 MILLIGRAM(S): at 05:25

## 2024-08-01 RX ADMIN — Medication 12.5 MILLIGRAM(S): at 13:18

## 2024-08-01 RX ADMIN — SPIRONOLACTONE 25 MILLIGRAM(S): 50 TABLET, FILM COATED ORAL at 05:25

## 2024-08-01 RX ADMIN — Medication 400 MILLIGRAM(S): at 16:01

## 2024-08-01 RX ADMIN — Medication 650 MILLIGRAM(S): at 09:51

## 2024-08-01 RX ADMIN — Medication 1000 MILLIGRAM(S): at 16:30

## 2024-08-01 RX ADMIN — CLOPIDOGREL BISULFATE 75 MILLIGRAM(S): 75 TABLET, FILM COATED ORAL at 12:50

## 2024-08-01 RX ADMIN — FINASTERIDE 5 MILLIGRAM(S): 5 TABLET, FILM COATED ORAL at 12:50

## 2024-08-01 RX ADMIN — Medication 650 MILLIGRAM(S): at 10:15

## 2024-08-01 NOTE — DISCHARGE NOTE PROVIDER - NSDCFUSCHEDAPPT_GEN_ALL_CORE_FT
[facility]  PULED [address]  Scheduled Appointment: 08/07/2024    Domingo Jackson  [facility] [address] [facility] [address]  Scheduled Appointment: 08/07/2024    [facility]  ELECTROPH [address]  Scheduled Appointment: 08/12/2024    [facility]  ELECTROPH [address]  Scheduled Appointment: 08/20/2024    Nesha Burkett  [facility]  CARDIOLOGY [address]  Scheduled Appointment: 08/27/2024     [facility]  PULED [address] [facility]  Scheduled Appointment: 08/07/2024    Domingo Jackson  [facility] [address] [facility]  Scheduled Appointment: 08/07/2024    [facility]  ELECTROPH [address]  Scheduled Appointment: 08/08/2024    [facility]  ELECTROPH [address]  Scheduled Appointment: 08/20/2024    Nesha Bukrett  [facility]  CARDIOLOGY [address]  Scheduled Appointment: 08/27/2024     [facility]  ELECTROPH [address]  Scheduled Appointment: 08/08/2024    [facility]  ELECTROPH [address]  Scheduled Appointment: 08/20/2024    Nesha Burkett  [facility]  CARDIOLOGY [address]  Scheduled Appointment: 08/27/2024

## 2024-08-01 NOTE — DISCHARGE NOTE PROVIDER - CARE PROVIDER_API CALL
Chito Koroma  Cardiac Electrophysiology  [address], [address] [place] [address]  Phone: [phone]  Fax: [phone]  Scheduled Appointment: 08/12/2024 03:40 PM    Nesha Burkett  Interventional Cardiology  [address], [address] [place] [id number]  Phone: [phone]  Fax: [phone]  Scheduled Appointment: 08/27/2024 04:30 PM

## 2024-08-01 NOTE — PROGRESS NOTE ADULT - ASSESSMENT
83M [place]ian speaking Hx HTN, PAF (Eliquis, last dose 7/27), CHB s/p dual chamber PPM ([employer], implantation 2/2017), CAD s/p PCI, mixed restrictive and obstructive pulmonary disease, SESAR (not on CPAP), [place]; admit 7/30 with c/o fatigue/NELSON, generator is [facility] s/p CRT upgrade c/b brief hypotensive post procedure s/p Mayur bolus, transfer to CICU for close monitoring.       NEURO  AOx3, no active issues.     PULM  #Hx SESAR; mixed restrictive and obstructive pulm disease  - c/w home Spiriva, Symbicort, c/w Duonebs   - Sp02 > 94% on RA  - Nocturnal CPAP      CARDIOVASCULAR  #CARMITA s/p CRT upgrade; CHB s/p dual chamber PPM ([employer], implantation 2/2017)  *s/p CRT upgrade on 7/30 c/b brief hypotension post procedure, likely sedation-related   *Mild swelling and ecchymosis at CRT site concerning for hematoma, compression dressing intact/dry, H/H stable   *POCUS: no pericardial effusion   *PA/Lat CXR 7/31: s/p CRT upgrade with leads in appropriate position, stable L. pleural effusion   - Cefazolin x1 now  - f/u with EP about post-procedure abx ppx  - DC planning    #Hx Chronic Systolic HF, EF 27%  *TTE 12/2023: severely decreased LVSF (EF 27%), WMAs, Grade 1 DD, normal RVF  *TTE 7/31/2024: EF 30-35%, LV cavity is severely dilated  - GDMT: Lopressor 12.5 BID, [place] 25  - Euvolemic, target net even, strict I/Os, daily weights  - Hold home Toprol 50 iso hypotension     #Hx PAF  - c/w Lopressor 12.5 BID   - f/u EP reccs regarding re-starting Eliquis     #Hx CAD s/p PCI w/ HAYDER  *lipids: Chol 133, TGs 70, HDL 61, LDL 57  - c/w Plavix 75  - c/w Atorvastatin 10     GI  - Diet: DASH     RENAL  No active issues, SCr 1.27, euvolemic   - strict I/Os, target net even     HEME  *H/H stable, active T&S  - Monitor H/H iso anterior chest wall hematoma   - DVT ppx: SCDs, resume DOAC per EP    ENDO  No active issues. bGs 80s-100s.   *A1C 5.4  *TSH 1.51    ID  #Leukocytosis, mild   *Possible reactive, afebrile   - 1x Cefazolin now for post-procedure ppx   83M [place]ian speaking Hx HTN, PAF (Eliquis, last dose 7/27), CHB s/p dual chamber PPM ([employer], implantation 2/2017), CAD s/p PCI, mixed restrictive and obstructive pulmonary disease, SESAR (not on CPAP), [place]; admit 7/30 with c/o fatigue/NELSON, generator is [facility]MITA s/p CRT upgrade c/b brief hypotensive post procedure s/p Mayur bolus, transfer to CICU for close monitoring.       NEURO  AOx3, no active issues.     PULM  #Hx SESAR; mixed restrictive and obstructive pulm disease  - c/w home Spiriva, Symbicort, c/w Duonebs   - Sp02 > 94% on RA  - Nocturnal CPAP      CARDIOVASCULAR  #CARMITA s/p CRT upgrade; CHB s/p dual chamber PPM ([employer], implantation 2/2017)  *s/p CRT upgrade on 7/30 c/b brief hypotension post procedure, likely sedation-related   *Mild swelling and ecchymosis at CRT site concerning for hematoma, compression dressing intact/dry, H/H stable   *POCUS: no pericardial effusion   *PA/Lat CXR 7/31: s/p CRT upgrade with leads in appropriate position, stable L. pleural effusion   *S/p Cefazolin x1 7/31 for abx ppx   - f/u with EP about post-procedure abx ppx  - DC planning    #Hx Chronic Systolic HF, EF 27%  *TTE 12/2023: severely decreased LVSF (EF 27%), WMAs, Grade 1 DD, normal RVF  *TTE 7/31/2024: EF 30-35%, LV cavity is severely dilated  - GDMT: Lopressor 12.5 BID, [place] 25  - Euvolemic, target net even, strict I/Os, daily weights  - Hold home Toprol 50 iso hypotension     #Hx PAF  - c/w Lopressor 12.5 BID   - f/u EP reccs regarding re-starting Eliquis     #Hx CAD s/p PCI w/ HAYDER  *lipids: Chol 133, TGs 70, HDL 61, LDL 57  - c/w Plavix 75  - c/w Atorvastatin 10     GI  - Diet: DASH     RENAL  No active issues, SCr 1.27, euvolemic   - strict I/Os, target net even     HEME  *H/H stable, active T&S  - Monitor H/H iso anterior chest wall hematoma   - DVT ppx: SCDs, resume DOAC per EP    ENDO  No active issues. bGs 80s-100s.   *A1C 5.4  *TSH 1.51    ID  #Leukocytosis, mild   *Possible reactive, afebrile   *S/p Cefazolin x1 7/31 for post-procedure ppx   - Monitor fever curve and WBCs 89M [place] speaking Hx HTN, PAF (Eliquis, last dose 7/27), CHB s/p dual chamber PPM ([employer], implantation 2/2017), CAD s/p PCI, mixed restrictive and obstructive pulmonary disease, SESAR (not on CPAP), [place]; admit 7/30 with c/o fatigue/NELSON, generator is [facility] s/p CRT upgrade c/b brief hypotensive post procedure s/p Mayur bolus, transfer to CICU for close monitoring.       NEURO  AOx3, no active issues.     PULM  #Hx SESAR; mixed restrictive and obstructive pulm disease  - c/w home Spiriva, Symbicort, c/w Duonebs   - Sp02 > 94% on RA  - Nocturnal CPAP      CARDIOVASCULAR  #CARMITA s/p CRT upgrade; CHB s/p dual chamber PPM ([employer], implantation 2/2017)  *s/p CRT upgrade on 7/30 c/b brief hypotension post procedure, likely sedation-related   *Mild swelling and ecchymosis at CRT site concerning for hematoma, compression dressing intact/dry, H/H stable   *POCUS: no pericardial effusion   *PA/Lat CXR 7/31: s/p CRT upgrade with leads in appropriate position, stable L. pleural effusion   *S/p Cefazolin x1 7/31 for abx ppx   - f/u with EP about post-procedure abx ppx  - DC planning    #Hx Chronic Systolic HF, EF 27%  *TTE 12/2023: severely decreased LVSF (EF 27%), WMAs, Grade 1 DD, normal RVF  *TTE 7/31/2024: EF 30-35%, LV cavity is severely dilated  - GDMT: Lopressor 12.5 BID, [place] 25  - Euvolemic, target net even, strict I/Os, daily weights  - Hold home Toprol 50 iso hypotension     #Hx PAF  - c/w Lopressor 12.5 BID   - f/u EP reccs regarding re-starting Eliquis     #Hx CAD s/p PCI w/ HAYDER  *lipids: Chol 133, TGs 70, HDL 61, LDL 57  - c/w Plavix 75  - c/w Atorvastatin 10     GI  - Diet: DASH     RENAL  No active issues, SCr 1.27, euvolemic   - strict I/Os, target net even     HEME  *H/H stable, active T&S  - Monitor H/H iso anterior chest wall hematoma   - DVT ppx: SCDs, resume DOAC per EP    ENDO  No active issues. bGs 80s-100s.   *A1C 5.4  *TSH 1.51    ID  #Leukocytosis, mild   *Possible reactive, afebrile   *S/p Cefazolin x1 7/31 for post-procedure ppx   - Monitor fever curve and WBCs 89M [place] speaking Hx HTN, PAF (Eliquis, last dose 7/27), CHB s/p dual chamber PPM ([employer], implantation 2/2017), CAD s/p PCI, mixed restrictive and obstructive pulmonary disease, SESAR (not on CPAP), [place]; admit 7/30 with c/o fatigue/NELSON, generator is [facility]MITA s/p CRT upgrade c/b brief hypotensive post procedure s/p Mayur bolus, transfer to CICU for close monitoring.       NEURO  AOx3, no active issues.     PULM  #Hx SESAR; mixed restrictive and obstructive pulm disease  - c/w home Spiriva, Symbicort, c/w Duonebs   - Sp02 > 94% on RA  - Nocturnal CPAP      CARDIOVASCULAR  #CARMITA s/p CRT upgrade; CHB s/p dual chamber PPM ([employer], implantation 2/2017)  *s/p CRT upgrade on 7/30 c/b brief hypotension post procedure, likely sedation-related   *Mild swelling and ecchymosis at CRT site concerning for hematoma, compression dressing intact/dry, H/H stable   *POCUS: no pericardial effusion   *PA/Lat CXR 7/31: s/p CRT upgrade with leads in appropriate position, stable L. pleural effusion   *S/p Cefazolin x1 7/31 for abx ppx   - HOLD Eliquis until outpatient f/u with EP   - DC planning  - Outpatient f/u with EP (8/20) and Cardiology (8/27)    #Hx Chronic Systolic HF, EF 27%  *TTE 12/2023: severely decreased LVSF (EF 27%), WMAs, Grade 1 DD, normal RVF  *TTE 7/31/2024: EF 30-35%, LV cavity is severely dilated  - Increase Lopressor to 50 mg BID, per Dr. Koroma   - GDMT: Lopressor 50 BID, [place]aw[place] 25  - Euvolemic, target net even, strict I/Os, daily weights      #Hx PAF  - c/w Lopressor 50 mg BID  - HOLD Eliquis until outpatient f/u with EP     #Hx CAD s/p PCI w/ HAYDER  *lipids: Chol 133, TGs 70, HDL 61, LDL 57  - c/w Plavix 75  - c/w Atorvastatin 10     GI  - Diet: DASH     RENAL  No active issues, SCr 1.27, euvolemic   - strict I/Os, target net even     HEME  *H/H stable, active T&S  - Monitor H/H iso anterior chest wall hematoma   - DVT ppx: SCDs    ENDO  No active issues. bGs 80s-100s.   *A1C 5.4  *TSH 1.51    ID  #Leukocytosis, mild   *Possible reactive, afebrile x48 hrs  *S/p Cefazolin x1 7/31 for post-procedure ppx   - Monitor fever curve and WBCs

## 2024-08-01 NOTE — DISCHARGE NOTE PROVIDER - HOSPITAL COURSE
Hospital course: Gianfranco Otero is a 89 yo M with PMHx CHB s/p dual-chamber PPM ([employer], 02/2017), CAD s/p PCI, HTN, paroxysmal AF (on Eliquis, last dose 7/27), mixed restrictive and obstructive pulmonary disease, SESAR (not on CPAP), who developed progressive fatigue and SOB w/ ADLs x1 week and was referred for possible CRT upgrade given his generator is at [facility]. Patient underwent CRT upgrade on 7/30, course c/b brief episode of hypotension post-procedure likely related to sedation, requiring Mayur bolus and IVF, and subsequently transferred to CICU for closer monitoring. Patient has remained HDS and has not required IVF or Mayur x24 hrs.     Patient also developed a small pocket hematoma at incision site over L. anterior chest. Hematoma confirmed with POCUS, no pericardial effusion or tamponade noted. H/H has remained stable ~11 and no transfusions given. Pressure dressing was applied x24 hrs, removed 8/1 AM by EP.     Patient medically cleared for discharge.       Important medication changes and reason:  - Stop Eliquis for now. Dr. Koroma to decide when to resume Eliquis at outpatient follow-up appointment.   - Start Lopressor 50 mg BID     Discharge action items for follow up:  - When to resume Eliquis, as determined by Dr. Koroma     Discharge diagnoses: Complete heart block s/p PPM upgrade     Dispo: Home    Code status: Full   Hospital course: Gianfranco Otero is a 89 yo M with PMHx CHB s/p dual-chamber PPM ([employer] [employer], 02/2017), CAD s/p PCI, HTN, paroxysmal AF (on Eliquis, last dose 7/27), mixed restrictive and obstructive pulmonary disease, SESAR (not on CPAP), who developed progressive fatigue and SOB w/ ADLs x1 week and was referred for possible CRT upgrade given his generator is at [facility]. Patient underwent CRT upgrade on 7/30, course c/b brief episode of hypotension post-procedure likely related to sedation, requiring Mayur bolus and IVF, and subsequently transferred to CICU for closer monitoring. Patient has remained HDS and has not required IVF or Mayur x24 hrs.     Patient also developed a small pocket hematoma at incision site over L. anterior chest. Hematoma confirmed with POCUS, no pericardial effusion or tamponade noted. H/H has remained stable ~11 and no transfusions given. Pressure dressing was applied x24 hrs, removed 8/1 AM by EP.     Patient medically cleared for discharge.       Important medication changes and reason:  - Stop Eliquis for now. Dr. Koroma to decide when to resume Eliquis at outpatient follow-up appointment.   - Start Lopressor 50 mg BID     Discharge action items for follow up:  - When to resume Eliquis, as determined by Dr. Koroma     Discharge diagnoses: Complete heart block s/p PPM upgrade     Dispo: Home    Code status: Full   Hospital course: Gianfranco Otero is a 89 yo M with PMHx CHB s/p dual-chamber PPM ([employer] [employer], 02/2017), CAD s/p PCI, HTN, paroxysmal AF (on Eliquis, last dose 7/27), mixed restrictive and obstructive pulmonary disease, SESAR (not on CPAP), who developed progressive fatigue and SOB w/ ADLs x1 week and was referred for possible CRT upgrade given his generator is at [facility]. Patient underwent CRT upgrade on 7/30, course c/b brief episode of hypotension post-procedure likely related to sedation, requiring Mayur bolus and IVF, and subsequently transferred to CICU for closer monitoring. Patient has remained HDS and has not required IVF or Mayur x24 hrs.     Patient also developed a small pocket hematoma at incision site over L. anterior chest. Hematoma confirmed with POCUS, no pericardial effusion or tamponade noted. H/H has remained stable ~11 and no transfusions given. Pressure dressing was applied x24 hrs, removed 8/1 AM by EP.     Patient medically cleared for discharge.       Important medication changes and reason:  - Stop Eliquis for now. Dr. Koroma to decide when to resume Eliquis at outpatient follow-up appointment.   - Start Metoprolol succinate 25 mg BID    Discharge action items for follow up:  - When to resume Eliquis, as determined by Dr. Koroma     Discharge diagnoses: Complete heart block s/p PPM upgrade     Dispo: Home    Code status: Full   Hospital course: Gianfranco Otero is a 89 yo M with PMHx CHB s/p dual-chamber PPM ([employer], 02/2017), CAD s/p PCI, HTN, paroxysmal AF (on Eliquis, last dose 7/27), mixed restrictive and obstructive pulmonary disease, SESAR (not on CPAP), who developed progressive fatigue and SOB w/ ADLs x1 week and was referred for possible CRT upgrade given his generator is at [facility]. Patient underwent CRT upgrade on 7/30, course c/b brief episode of hypotension post-procedure likely related to sedation, requiring Mayur bolus and IVF, and subsequently transferred to CICU for closer monitoring. Patient has remained HDS and has not required IVF or Mayur x24 hrs.     Patient also developed a small pocket hematoma at incision site over L. anterior chest. Hematoma confirmed with POCUS, no pericardial effusion or tamponade noted. H/H has remained stable ~11 and no transfusions given. Pressure dressing was applied x24 hrs, removed 8/1 AM by EP.     Patient medically cleared for discharge.       Important medication changes and reason:  - Stop Eliquis for now. Dr. Koroma to decide when to resume Eliquis at outpatient follow-up appointment.   - Start Metoprolol succinate 25 mg BID, for suppression of ectopy and treatment of systolic HF   - Start Spironolactone 25 mg QD, for treatment of systolic HF    Discharge action items for follow up:  - When to resume Eliquis, as determined by Dr. Koroma     Discharge diagnoses: Complete heart block s/p PPM upgrade     Dispo: Home    Code status: Full   Hospital course: Gianfranco Otero is a 89 yo M with PMHx CHB s/p dual-chamber PPM ([employer], 02/2017), CAD s/p PCI, HTN, paroxysmal AF (on Eliquis, last dose 7/27), mixed restrictive and obstructive pulmonary disease, SESAR (not on CPAP), who developed progressive fatigue and SOB w/ ADLs x1 week and was referred for possible CRT upgrade given his generator is at [facility]. Patient underwent CRT upgrade on 7/30, course c/b brief episode of hypotension post-procedure likely related to sedation, requiring Mayur bolus and IVF, and subsequently transferred to CICU for closer monitoring. Patient has remained HDS and has not required IVF or Mayur x24 hrs.     Patient also developed a small pocket hematoma at incision site over L. anterior chest. Hematoma confirmed with POCUS, no pericardial effusion or tamponade noted. H/H has remained stable ~11 and no transfusions given. Pressure dressing was applied x24 hrs, removed 8/1 AM by EP.     Patient medically cleared for discharge.       Important medication changes and reason:  - Stop Eliquis for now. Dr. Koroma to decide when to resume Eliquis at outpatient follow-up appointment.   - Start Metoprolol succinate 25 mg BID, for suppression of ectopy and treatment of systolic HF   - Start Spironolactone 25 mg QD, for treatment of systolic HF    Discharge action items for follow up:  - When to resume Eliquis, as determined by Dr. Koroma     Discharge diagnoses: Complete heart block s/p PPM upgrade     Dispo: Home    Code status: Full    The hematoma was improved prior to discharge and pressure bandage removed by EP.

## 2024-08-01 NOTE — PROGRESS NOTE ADULT - SUBJECTIVE AND OBJECTIVE BOX
24H hour events: ASVP/APVP   no events overnight     MEDICATIONS:  clopidogrel Tablet 75 milliGRAM(s) Oral daily  metoprolol tartrate 12.5 milliGRAM(s) Oral two times a day  spironolactone 25 milliGRAM(s) Oral daily      albuterol/ipratropium for Nebulization 3 milliLiter(s) Nebulizer every 6 hours  budesonide  80 MICROgram(s)/formoterol 4.5 MICROgram(s) Inhaler 2 Puff(s) Inhalation two times a day  tiotropium 2.5 MICROgram(s) Inhaler 2 Puff(s) Inhalation daily    acetaminophen     Tablet .. 650 milliGRAM(s) Oral every 6 hours PRN      atorvastatin 10 milliGRAM(s) Oral at bedtime  finasteride 5 milliGRAM(s) Oral daily    tamsulosin 0.8 milliGRAM(s) Oral at bedtime      REVIEW OF SYSTEMS:  See HPI, otherwise ROS negative.    PHYSICAL EXAM:  T(C): 36.8 (08-01-24 @ 08:00), Max: 36.9 (07-31-24 @ 22:00)  HR: 62 (08-01-24 @ 09:00) (60 - 74)  BP: 118/54 (08-01-24 @ 09:00) (96/54 - 178/72)  RR: 30 (08-01-24 @ 09:00) (17 - 34)  SpO2: 95% (08-01-24 @ 09:00) (92% - 100%)  Wt(kg): --  I&O's Summary    31 Jul 2024 07:01  -  01 Aug 2024 07:00  --------------------------------------------------------  IN: 470 mL / OUT: 1225 mL / NET: -755 mL        Appearance: Alert. NAD	  Cardiovascular: +S1S2 RRR no m/g/r  Respiratory: CTA B/L	  Psychiatry: A & O x 3, Mood & affect appropriate  Gastrointestinal:  Soft, NT. ND. +BS	  Skin: No rashes masses or lesions   L infraclavicular wound examined. Pressure dressing removed. Small hematoma noted at distal margin of pocket within demarcation from yesterday. Device border palpated discretely. Tender to palpation. Ecchymosis unchanged 	  Neurologic: Non-focal  Extremities: No edema BLE  Vascular: Peripheral pulses palpable 2+ bilaterally      LABS:	 	    CBC Full  -  ( 01 Aug 2024 00:21 )  WBC Count : 11.27 K/uL  Hemoglobin : 11.3 g/dL  Hematocrit : 34.5 %  Platelet Count - Automated : 177 K/uL  Mean Cell Volume : 94.0 fl  Mean Cell Hemoglobin : 30.8 pg  Mean Cell Hemoglobin Concentration : 32.8 gm/dL  Auto Neutrophil # : x  Auto Lymphocyte # : x  Auto Monocyte # : x  Auto Eosinophil # : x  Auto Basophil # : x  Auto Neutrophil % : x  Auto Lymphocyte % : x  Auto Monocyte % : x  Auto Eosinophil % : x  Auto Basophil % : x    08-01    133<L>  |  101  |  16  ----------------------------<  104<H>  4.4   |  20<L>  |  1.00  07-31    138  |  105  |  22  ----------------------------<  115<H>  4.0   |  18<L>  |  1.27    Ca    9.3      01 Aug 2024 00:21  Ca    9.1      31 Jul 2024 01:25  Phos  2.6     08-01  Phos  2.9     07-31  Mg     2.0     08-01  Mg     2.1     07-31    TPro  6.1  /  Alb  3.5  /  TBili  0.7  /  DBili  x   /  AST  20  /  ALT  8<L>  /  AlkPhos  91  08-01  TPro  6.1  /  Alb  3.5  /  TBili  0.4  /  DBili  x   /  AST  19  /  ALT  13  /  AlkPhos  99  07-31    TSH: Thyroid Stimulating Hormone, Serum: 1.46 uIU/mL (07.31.24 @ 09:44)          CARDIAC MARKERS:          TELEMETRY:   	    ECG:  	    RADIOLOGY:    	  ASSESSMENT/PLAN: 	     24H hour events: ASVP/APVP   no events overnight     MEDICATIONS:  clopidogrel Tablet 75 milliGRAM(s) Oral daily  metoprolol tartrate 12.5 milliGRAM(s) Oral two times a day  spironolactone 25 milliGRAM(s) Oral daily      albuterol/ipratropium for Nebulization 3 milliLiter(s) Nebulizer every 6 hours  budesonide  80 MICROgram(s)/formoterol 4.5 MICROgram(s) Inhaler 2 Puff(s) Inhalation two times a day  tiotropium 2.5 MICROgram(s) Inhaler 2 Puff(s) Inhalation daily    acetaminophen     Tablet .. 650 milliGRAM(s) Oral every 6 hours PRN      atorvastatin 10 milliGRAM(s) Oral at bedtime  finasteride 5 milliGRAM(s) Oral daily    tamsulosin 0.8 milliGRAM(s) Oral at bedtime      REVIEW OF SYSTEMS:  See HPI, otherwise ROS negative.    PHYSICAL EXAM:  T(C): 36.8 (08-01-24 @ 08:00), Max: 36.9 (07-31-24 @ 22:00)  HR: 62 (08-01-24 @ 09:00) (60 - 74)  BP: 118/54 (08-01-24 @ 09:00) (96/54 - 178/72)  RR: 30 (08-01-24 @ 09:00) (17 - 34)  SpO2: 95% (08-01-24 @ 09:00) (92% - 100%)  Wt(kg): --  I&O's Summary    31 Jul 2024 07:01  -  01 Aug 2024 07:00  --------------------------------------------------------  IN: 470 mL / OUT: 1225 mL / NET: -755 mL        Appearance: Alert. NAD	  Cardiovascular: +S1S2 RRR no m/g/r  Respiratory: CTA B/L	  Psychiatry: A & O x 3, Mood & affect appropriate  Gastrointestinal:  Soft, NT. ND. +BS	  Skin: No rashes masses or lesions   L infraclavicular wound examined. Pressure dressing removed. Small hematoma noted at distal margin of pocket within demarcation from yesterday. Device border palpated discretely. Tender to palpation. Ecchymosis unchanged 	  Neurologic: Non-focal  Extremities: No edema BLE  Vascular: Peripheral pulses palpable 2+ bilaterally      LABS:	 	    CBC Full  -  ( 01 Aug 2024 00:21 )  WBC Count : 11.27 K/uL  Hemoglobin : 11.3 g/dL  Hematocrit : 34.5 %  Platelet Count - Automated : 177 K/uL  Mean Cell Volume : 94.0 fl  Mean Cell Hemoglobin : 30.8 pg  Mean Cell Hemoglobin Concentration : 32.8 gm/dL  Auto Neutrophil # : x  Auto Lymphocyte # : x  Auto Monocyte # : x  Auto Eosinophil # : x  Auto Basophil # : x  Auto Neutrophil % : x  Auto Lymphocyte % : x  Auto Monocyte % : x  Auto Eosinophil % : x  Auto Basophil % : x    08-01    133<L>  |  101  |  16  ----------------------------<  104<H>  4.4   |  20<L>  |  1.00  07-31    138  |  105  |  22  ----------------------------<  115<H>  4.0   |  18<L>  |  1.27    Ca    9.3      01 Aug 2024 00:21  Ca    9.1      31 Jul 2024 01:25  Phos  2.6     08-01  Phos  2.9     07-31  Mg     2.0     08-01  Mg     2.1     07-31    TPro  6.1  /  Alb  3.5  /  TBili  0.7  /  DBili  x   /  AST  20  /  ALT  8<L>  /  AlkPhos  91  08-01  TPro  6.1  /  Alb  3.5  /  TBili  0.4  /  DBili  x   /  AST  19  /  ALT  13  /  AlkPhos  99  07-31    TSH: Thyroid Stimulating Hormone, Serum: 1.46 uIU/mL (07.31.24 @ 09:44)    TELEMETRY: AS/APVP  	   RADIOLOGY: < from: Xray Chest 2 Views PA/Lat (07.31.24 @ 09:10) >    IMPRESSION:  Status post CRT of grade with leads in appropriate position.    Left pleural effusion.    < end of copied text >

## 2024-08-01 NOTE — DISCHARGE NOTE PROVIDER - NS AS DC PROVIDER CONTACT Y/N MULTI
[address] Dominique Valverde MD, Alfredo Jovel MD Davida Mock, PA-C Allan Damme, [facility]     Samantha Tiwari, [facility]   Ritesh Cartwright [facility]    Dave Knott, [facility]       [facility] [address]    at [place] [id number] [facility], [address] [facility], [facility]  [facility].    [phone]    FAX: [address] [place] [address], [address]    [phone]    FAX: [phone]         Patient Care Team:  Anna Pierre MD as PCP - General (Internal Medicine)  Su Gonzales MD (Oncology)      Problem List  Date Reviewed: 9/29/2020          Codes Class Noted    Hypothyroidism ICD-10-CM: E03.9  ICD-9-CM: 244.9  7/15/2020        Thrombocytopenia ([address] ICD-10-CM: D69.6  ICD-9-CM: 287.5  7/15/2020        History of cholecystectomy ICD-10-CM: Z90.49  ICD-9-CM: V45.79  7/15/2020        Elevated liver enzymes ICD-10-CM: R74.8  ICD-9-CM: 790.5  7/15/2020              Lindsay Scales is being seen at [facility] for management of suspected non-alcoholic fatty liver disease (NAFLD). The active problem list, all pertinent past medical history, medications, liver histology, radiologic findings, and laboratory findings related to the liver disorder were reviewed with the patient. The patient is a 54 y.o.  male ho was found to have elevated liver transaminases in 2015. Serologic evaluation for markers of chronic liver disease was negative for HCV, HBV, JARON,     Ultrasound of the liver was performed in 12/2019. The results of the imaging suggested fatty liver disease. Assessment of liver fibrosis with Fibroscan was performed in the office today. The result was 6.5 kPa which correlates with stage 1 portal fibrosis.  The CAP score of 328 suggests fatty liver. The patient has no symptoms which can be attributed to the liver disorder. The patient is not currently experiencing the following symptoms of liver disease:  fatigue, pain in the right side over the liver    The patient completes all daily activities without any functional limitations. ASSESSMENT AND PLAN:  Fatty Liver    Suspect the patient has fatty liver based upon imaging, Fiboscan CAP score, serologic studies that are negative for other causes of chronic liver disease. The histologic severity has not been defined. Elastography performed in 9/2020 suggests stage 1 portal fibrosis. AST is normal.  ALT is elevated. ALP is normal.  Liver function is normal.  The platelet count is depressed. Based upon laboratory studies, Fibroscan, and imaging the patient does not appear to have significant liver injury. Serologic testing for causes of chronic liver disease were negative for HCV, HBV, JARON     The most likely causes for the liver chemistry abnormalities were discussed with the patient and include fatty liver disease    The need to perform an assessment of liver fibrosis was discussed with the patient. The Fibroscan can assess liver fibrosis and determine if a patient has advanced fibrosis or cirrhosis without the need for liver biopsy. The Fibroscan can be repeated annually or as often as clinically indicated to assess for progression or regression of fibrosis. Have performed laboratory testing to monitor liver function and degree of liver injury. This included BMP, hepatic panel, CBC with platelet count, INR. Only a liver biopsy can confirm if the patient does have fatty liver and differentiate NAFL from GALAVIZ. The treatment is the same; weight reduction. If the liver biopsy demonstrates GALAVIZ the patient could be eligible for enrollment into clinical trials for treatment of GALAVIZ.     There is no reason to perform liver biopsy at this time. Liver chemistries will be monitored. If the patient loses 20% of current body weight, which is 42 pounds, down to a weight of 170 pounds, all steatosis will have resolved. Once all steatosis has resolved all inflammation will resolve. Then all fibrosis will gradually resolve and the liver could eventually be normal.    There is currently no FDA approved medical treatment for fatty liver, NALFD or GALAVIZ. The only medical treatments for GALAVIZ are through clinical trials. The patient would be a good candidate for enrollment into a clinical trial if found to have GALAVIZ. The patient will be monitored at periodic intervals. If weight loss is successful hepatic steatosis will resolve and liver enzymes should return to the normal range. Since a liver biopsy was not performed it is possible the patient may not have fatty liver or this may not be contributing to the elevation in liver enzymes. If liver enzymes do not return to normal with weight reduction then additional evaluation including liver biopsy may be necessary to determine if the elevated liver enzymes is due to another etiology. Counseling for diet and weight loss in patients with confirmed or suspected NAFLD  The patient was counseled regarding diet and exercise to achieve weight loss. The best diet for patients with fatty liver is one very low in carbohydrates and enriched with protein such as an Nevaeh's program.      The patient was told not to consume any food products and drinks containing fructose as this enhances hepatic fat synthesis. There is no medication or vitamin supplements that we advocate for AGLAVIZ. Using glitazones in patients without diabetes mellitus has been shown to reduce fat content in the liver but has no effect on fibrosis and is associated with weight gain. Vitamin E has also been used but the data is not very good and most experts no longer advocate this.       Positive serology for autoimmune liver disease  The positive ASMA suggests that there the may be an underlying autoimmune liver disease. Given that the liver enzymes have returned to normal it is unlikely there is an autoimmune liver disorder. Will continue to monitor to see if the liver enzymes remain normal, fluctuate or become persistently elevated. Low ceruloplasmin  The is a slightly low serum ceruloplasmin. It is unlikely that the patient has Wilsons disease. Will obtain Wilsons disease genetic testing. Screening for Hepatocellular Carcinoma  HCC screening is not necessary if the patient has no evidence of cirrhosis. Treatment of other medical problems in patients with chronic liver disease  There are no contraindications for the patient to take most medications that are necessary for treatment of other medical issues. Counseling for alcohol in patients with chronic liver disease  The patient was counseled regarding alcohol consumption and the effect of alcohol on chronic liver disease. The patient does not consume any significant amount of alcohol. Vaccinations   Vaccination for viral hepatitis A and B is recommended since the patient has no serologic evidence of previous exposure or vaccination with immunity. Routine vaccinations against other bacterial and viral agents can be performed as indicated. Annual flu vaccination should be administered if indicated. ALLERGIES  Not on File    MEDICATIONS  No current outpatient medications on file. No current facility-administered medications for this visit. SYSTEM REVIEW NOT RELATED TO LIVER DISEASE OR REVIEWED ABOVE:  Constitution systems: Negative for fever, chills, weight gain, weight loss. Eyes: Negative for visual changes. ENT: Negative for sore throat, painful swallowing. Respiratory: Negative for cough, hemoptysis, SOB. Cardiology: Negative for chest pain, palpitations. GI:  Negative for constipation or diarrhea.   : Negative for urinary frequency, dysuria, hematuria, nocturia. Skin: Negative for rash. Hematology: Negative for easy bruising, blood clots. Musculo-skelatal: Negative for back pain, muscle pain, weakness. Neurologic: Negative for headaches, dizziness, vertigo, memory problems not related to HE. Psychology: Negative for anxiety, depression. FAMILY HISTORY:  The father Has/had the following following chronic disease(s): bladder cancer. The mother  of CVA. There is no family history of liver disease. SOCIAL HISTORY:  The patient is . The patient has 2 children,   The patient has never used tobacco products. The patient has never consumed significant amounts of alcohol. The patient currently works full time as . PHYSICAL EXAMINATION:  Visit Vitals  /88 (BP 1 Location: Right arm, BP Patient Position: Sitting)   Pulse 63   Temp 97.4 °F (36.3 °C)   Resp 16   Ht 6' (1.829 m)   Wt 212 lb (96.2 kg)   SpO2 95%   BMI 28.75 kg/m²     General: No acute distress. Eyes: Sclera anicteric. ENT: No oral lesions. Thyroid normal.  Nodes: No adenopathy. Skin: No spider angiomata. No jaundice. No palmar erythema. Respiratory: Lungs clear to auscultation. Cardiovascular: Regular heart rate. No murmurs. No JVD. Abdomen: Soft non-tender. Liver size normal to percussion/palpation. Spleen not palpable. No obvious ascites. Extremities: No edema. No muscle wasting. No gross arthritic changes. Neurologic: Alert and oriented. Cranial nerves grossly intact. No asterixis.       LABORATORY STUDIES:  [facility] of [address] [place] [address] & Units 2020   WBC 4.6 - 13.2 K/uL 4.4 (L)   ANC 1.8 - 8.0 K/UL 2.4   HGB 13.0 - 16.0 g/dL 15.9    - 420 K/uL 137   INR 0.8 - 1.2   1.1   AST 10 - 38 U/L 30   ALT 16 - 61 U/L 64 (H)   Alk Phos 45 - 117 U/L 88   Bili, Total 0.2 - 1.0 MG/DL 1.1 (H)   Bili, Direct 0.0 - 0.2 MG/DL 0.2   Albumin 3.4 - 5.0 g/dL 4.0   BUN 7.0 - 18 MG/DL 9   Creat 0.6 - 1.3 MG/DL 1.03   Na 136 - 145 mmol/L 138   K 3.5 - 5.5 mmol/L 4.1   Cl 100 - 111 mmol/L 104   CO2 21 - 32 mmol/L 31   Glucose 74 - 99 mg/dL 105 (H)     SEROLOGIES:  5/2018. anti-HCV negative,   12/2019. HBsAntigen negative, anti-HBcore negative, anti-HBsurface negative, JARON negative, anti-HIV negative    Serologies Latest Ref Rng & Units 9/29/2020   Hep A Ab, Total Negative   Negative   Ferritin 8 - 388 NG/   Iron % Saturation 20 - 50 % 30   ASMCA 0 - 19 Units 20 (H)   Alpha-1 antitrypsin level 101 - 187 mg/dL 77 (L)       LIVER HISTOLOGY:  9/2020. [facility]. EkPa was 6.5. IQR/med 12%. . The results suggested a fibrosis level of F1. The CAP score suggests fatty liver    ENDOSCOPIC PROCEDURES:  Not available or performed    RADIOLOGY:  12/2019. Ultrasound of liver. Echogenic consistent with fatty liver. No liver mass lesions. No dilated bile ducts. No ascites. OTHER TESTING:  Not available or performed      FOLLOW-UP:  All of the issues listed above in the Assessment and Plan were discussed with the patient. All questions were answered. The patient expressed a clear understanding of the above.     Follow-up Ben Callahan 32 in 6 months for RODRIGUEZ Hernandez 505, AGPCNP-BC  [address] [place] [address] [address]  [place], [address] [place] [address] - [address]  [phone] Yes

## 2024-08-01 NOTE — DISCHARGE NOTE PROVIDER - NSDCCPCAREPLAN_GEN_ALL_CORE_FT
PRINCIPAL DISCHARGE DIAGNOSIS  Diagnosis: S/P cardiac pacemaker procedure  Assessment and Plan of Treatment: You were previously diagnosed with Complete Heart Block and had a permanent pacemaker placed in 2017. Complete heart block is when electricity is not properly conducted through the heart. You had your pacemaker upgraded during this hospitalization, which will ensure your heart beats at a regular pace. You will need outpatient follow-up with Electrophysiology (EP) and Cardiology.      SECONDARY DISCHARGE DIAGNOSES  Diagnosis: History of complete heart block  Assessment and Plan of Treatment: Your pacemaker was upgraded, which will ensure your heart beats at a regular pace. You will need outpatient follow-up with Electrophysiology (EP) and Cardiology.     PRINCIPAL DISCHARGE DIAGNOSIS  Diagnosis: S/P cardiac pacemaker procedure  Assessment and Plan of Treatment: You were previously diagnosed with Complete Heart Block and had a permanent pacemaker placed in 2017. Complete heart block is when electricity is not properly conducted through the heart. You had your pacemaker upgraded during this hospitalization, which will ensure your heart beats at a regular pace. You will need outpatient follow-up with Electrophysiology (EP) and Cardiology. Please take metoprolol 25mg twice per day and please hold your eliquis until you follow up with Electrophysiology.      SECONDARY DISCHARGE DIAGNOSES  Diagnosis: History of complete heart block  Assessment and Plan of Treatment: Your pacemaker was upgraded, which will ensure your heart beats at a regular pace. You will need outpatient follow-up with Electrophysiology (EP) and Cardiology.

## 2024-08-01 NOTE — DISCHARGE NOTE PROVIDER - NSDCMRMEDTOKEN_GEN_ALL_CORE_FT
alfuzosin 10 mg oral tablet, extended release: 1 tab(s) orally once a day  Eliquis 2.5 mg oral tablet: 1 tab(s) orally 2 times a day last dose was on 7/27/24  finasteride 5 mg oral tablet: 1 tab(s) orally once a day  furosemide 20 mg oral tablet: 1 tab(s) orally once a day  Plavix 75 mg oral tablet: 1 tab(s) orally once a day   pravastatin 10 mg oral tablet: 1 tab(s) orally once a day (at bedtime)  Trelegy Ellipta 100 mcg-62.5 mcg-25 mcg/inh inhalation powder: 1 puff(s) inhaled once a day   alfuzosin 10 mg oral tablet, extended release: 1 tab(s) orally once a day  atorvastatin 10 mg oral tablet: 1 tab(s) orally once a day (at bedtime)  finasteride 5 mg oral tablet: 1 tab(s) orally once a day  furosemide 20 mg oral tablet: 1 tab(s) orally once a day  ipratropium-albuterol 0.5 mg-2.5 mg/3 mL inhalation solution: 3 milliliter(s) inhaled every 6 hours  Plavix 75 mg oral tablet: 1 tab(s) orally once a day   spironolactone 25 mg oral tablet: 1 tab(s) orally once a day  tiotropium 2.5 mcg/inh inhalation aerosol: 2 puff(s) inhaled once a day  Trelegy Ellipta 100 mcg-62.5 mcg-25 mcg/inh inhalation powder: 1 puff(s) inhaled once a day   alfuzosin 10 mg oral tablet, extended release: 1 tab(s) orally once a day  atorvastatin 10 mg oral tablet: 1 tab(s) orally once a day (at bedtime)  clopidogrel 75 mg oral tablet: 1 tab(s) orally once a day  finasteride 5 mg oral tablet: 1 tab(s) orally once a day  furosemide 20 mg oral tablet: 1 tab(s) orally once a day  metoprolol succinate 25 mg oral tablet, extended release: 1 tab(s) orally 2 times a day Take 1 tablet twice per day.  nitroglycerin 0.4 mg sublingual tablet: 1 tab(s) sublingually as needed for  chest pain Take as needed for chest pain  nitroglycerin 0.4 mg sublingual tablet: 1 tab(s) sublingually as needed for  chest pain  spironolactone 25 mg oral tablet: 1 tab(s) orally once a day Take 1 tablet daily  Trelegy Ellipta 100 mcg-62.5 mcg-25 mcg/inh inhalation powder: 1 puff(s) inhaled once a day   alfuzosin 10 mg oral tablet, extended release: 1 tab(s) orally once a day  atorvastatin 10 mg oral tablet: 1 tab(s) orally once a day (at bedtime)  clopidogrel 75 mg oral tablet: 1 tab(s) orally once a day  clopidogrel 75 mg oral tablet: 1 tab(s) orally once a day  clopidogrel 75 mg oral tablet: 1 tab(s) orally once a day  finasteride 5 mg oral tablet: 1 tab(s) orally once a day  furosemide 20 mg oral tablet: 1 tab(s) orally once a day  metoprolol succinate 25 mg oral tablet, extended release: 1 tab(s) orally 2 times a day Take 1 tablet twice per day.  nitroglycerin 0.4 mg sublingual tablet: 1 tab(s) sublingually once a day as needed for  chest pain  spironolactone 25 mg oral tablet: 1 tab(s) orally once a day Take 1 tablet daily  Trelegy Ellipta 100 mcg-62.5 mcg-25 mcg/inh inhalation powder: 1 puff(s) inhaled once a day

## 2024-08-01 NOTE — PROGRESS NOTE ADULT - NS ATTEND AMEND GEN_ALL_CORE FT
Small hematoma, but breathing much better. Cr has improved. Excellent response to CRT upgrade. No AF but APD's. Echo personally reviewed and no dyssynchrony and LVEF now 35-40% (from less than 30% RV paced). Will hold apixaban until seen in follow up for site check.
Hematoma is small. Will use pressure bandage one more day. LV pacing well. Breathing is better. Echo reviewed and no effusion. EF is better than reported and looks decreased due to freuent VPD's. WIll increase beta blocker. BP stable, Plan on discharge tomorrow.

## 2024-08-01 NOTE — PROGRESS NOTE ADULT - SUBJECTIVE AND OBJECTIVE BOX
Patient is a 89y old  Male who presents with a chief complaint of CRT upgrade (31 Jul 2024 23:01)    HPI:  89 year old male, (Croatian speaking, daughter at bedside, unable to hear well with , used daughter as ), with PMHx of, HTN, CAD s/p PCI w/HAYDER, mixed restrictive and obstructive pulmonary disease, SESAR (not on CPAP), [place],  PAF on Eliquis ( on hold since saturday), CHB s/p dual chamber( [employer]) PPM implantation on 2/21/2017, who has developed progressive fatigue and dyspnea with ADL referred  for possible CRT upgrade given his generator is at [facility].    INTERVAL HPI/OVERNIGHT EVENTS:   No overnight events   Afebrile, hemodynamically stable     Subjective:    ICU Vital Signs Last 24 Hrs  T(C): 36.9 (31 Jul 2024 22:00), Max: 36.9 (31 Jul 2024 22:00)  T(F): 98.4 (31 Jul 2024 22:00), Max: 98.4 (31 Jul 2024 22:00)  HR: 67 (01 Aug 2024 06:00) (60 - 74)  BP: 159/65 (01 Aug 2024 06:00) (96/54 - 178/72)  BP(mean): 94 (01 Aug 2024 06:00) (70 - 103)  ABP: --  ABP(mean): --  RR: 20 (01 Aug 2024 06:00) (17 - 29)  SpO2: 94% (01 Aug 2024 06:00) (92% - 100%)    O2 Parameters below as of 01 Aug 2024 05:44  Patient On (Oxygen Delivery Method): room air          I&O's Summary    31 Jul 2024 07:01  -  01 Aug 2024 07:00  --------------------------------------------------------  IN: 470 mL / OUT: 1225 mL / NET: -755 mL          Daily     Daily     Adult Advanced Hemodynamics Last 24 Hrs  CVP(mm Hg): --  CVP(cm H2O): --  CO: --  CI: --  PA: --  PA(mean): --  PCWP: --  SVR: --  SVRI: --  PVR: --  PVRI: --    EKG/Telemetry Events:    MEDICATIONS  (STANDING):  albuterol/ipratropium for Nebulization 3 milliLiter(s) Nebulizer every 6 hours  atorvastatin 10 milliGRAM(s) Oral at bedtime  budesonide  80 MICROgram(s)/formoterol 4.5 MICROgram(s) Inhaler 2 Puff(s) Inhalation two times a day  clopidogrel Tablet 75 milliGRAM(s) Oral daily  finasteride 5 milliGRAM(s) Oral daily  metoprolol tartrate 12.5 milliGRAM(s) Oral two times a day  spironolactone 25 milliGRAM(s) Oral daily  tamsulosin 0.8 milliGRAM(s) Oral at bedtime  tiotropium 2.5 MICROgram(s) Inhaler 2 Puff(s) Inhalation daily    MEDICATIONS  (PRN):      Physical Exam    GENERAL: NAD, non-toxic appearing   HEAD: Atraumatic, normocephalic  EYES: EOMI, PERRLA, conjunctiva and sclera clear  NECK: Supple, no JVD, normal thyroid, no LAD  NEURO: AOx3, no FNDs  LUNGS: Clear to auscultation biltaerally. Good air entry. No rales, rhonchi, or wheezing.    HEART: Regular rate and rhythm. S1S2 normal, no S3. No mumur, rubs, gallps.   ABDOMEN: Soft, nontender, nondistended. Bowel sounds present.  EXTREMITIES: 2+ peripheral pulses. No edema, clubbing, or cyanosis.   LYMPH: No lymphadenopathy noted  SKIN: No rashes or lesions    LABS:                        11.3   11.27 )-----------( 177      ( 01 Aug 2024 00:21 )             34.5     08-01    133<L>  |  101  |  16  ----------------------------<  104<H>  4.4   |  20<L>  |  1.00    Ca    9.3      01 Aug 2024 00:21  Phos  2.6     08-01  Mg     2.0     08-01    TPro  6.1  /  Alb  3.5  /  TBili  0.7  /  DBili  x   /  AST  20  /  ALT  8<L>  /  AlkPhos  91  08-01    LIVER FUNCTIONS - ( 01 Aug 2024 00:21 )  Alb: 3.5 g/dL / Pro: 6.1 g/dL / ALK PHOS: 91 U/L / ALT: 8 U/L / AST: 20 U/L / GGT: x             CAPILLARY BLOOD GLUCOSE                Urinalysis Basic - ( 01 Aug 2024 00:21 )    Color: x / Appearance: x / SG: x / pH: x  Gluc: 104 mg/dL / Ketone: x  / Bili: x / Urobili: x   Blood: x / Protein: x / Nitrite: x   Leuk Esterase: x / RBC: x / WBC x   Sq Epi: x / Non Sq Epi: x / Bacteria: x          RADIOLOGY & ADDITIONAL TESTS:  CXR:        Care Discussed with Consultants/Other Providers [ x] YES  [ ] NO           Patient is a 89y old  Male who presents with a chief complaint of CRT upgrade (31 Jul 2024 23:01)    HPI:  89 year old male, (Sami speaking, daughter at bedside, unable to hear well with , used daughter as ), with PMHx of, HTN, CAD s/p PCI w/HAYDER, mixed restrictive and obstructive pulmonary disease, SESAR (not on CPAP), [place],  PAF on Eliquis ( on hold since saturday), CHB s/p dual chamber( [employer]) PPM implantation on 2/21/2017, who has developed progressive fatigue and dyspnea with ADL referred  for possible CRT upgrade given his generator is at [facility].    INTERVAL HPI/OVERNIGHT EVENTS:   No overnight events   Afebrile, hemodynamically stable     Subjective: Patient overall feeling well, no complaints, eager to go home. Endorses improved pain at CRT site. Pressure dressing over L. anterior chest.     ICU Vital Signs Last 24 Hrs  T(C): 36.9 (31 Jul 2024 22:00), Max: 36.9 (31 Jul 2024 22:00)  T(F): 98.4 (31 Jul 2024 22:00), Max: 98.4 (31 Jul 2024 22:00)  HR: 67 (01 Aug 2024 06:00) (60 - 74)  BP: 159/65 (01 Aug 2024 06:00) (96/54 - 178/72)  BP(mean): 94 (01 Aug 2024 06:00) (70 - 103)  ABP: --  ABP(mean): --  RR: 20 (01 Aug 2024 06:00) (17 - 29)  SpO2: 94% (01 Aug 2024 06:00) (92% - 100%)    O2 Parameters below as of 01 Aug 2024 05:44  Patient On (Oxygen Delivery Method): room air          I&O's Summary    31 Jul 2024 07:01  -  01 Aug 2024 07:00  --------------------------------------------------------  IN: 470 mL / OUT: 1225 mL / NET: -755 mL          Daily     Daily     Adult Advanced Hemodynamics Last 24 Hrs  CVP(mm Hg): --  CVP(cm H2O): --  CO: --  CI: --  PA: --  PA(mean): --  PCWP: --  SVR: --  SVRI: --  PVR: --  PVRI: --    EKG/Telemetry Events:    MEDICATIONS  (STANDING):  albuterol/ipratropium for Nebulization 3 milliLiter(s) Nebulizer every 6 hours  atorvastatin 10 milliGRAM(s) Oral at bedtime  budesonide  80 MICROgram(s)/formoterol 4.5 MICROgram(s) Inhaler 2 Puff(s) Inhalation two times a day  clopidogrel Tablet 75 milliGRAM(s) Oral daily  finasteride 5 milliGRAM(s) Oral daily  metoprolol tartrate 12.5 milliGRAM(s) Oral two times a day  spironolactone 25 milliGRAM(s) Oral daily  tamsulosin 0.8 milliGRAM(s) Oral at bedtime  tiotropium 2.5 MICROgram(s) Inhaler 2 Puff(s) Inhalation daily    MEDICATIONS  (PRN):      Physical Exam    GENERAL: NAD, non-toxic appearing   HEAD: Atraumatic, normocephalic  EYES: EOMI, PERRLA, conjunctiva and sclera clear  NECK: Supple, no JVD, normal thyroid, no LAD  NEURO: AOx3, no FNDs  LUNGS: Pressure dressing at L. anterior chest, surrounding ecchymosis. Dressing clean. Clear to auscultation B/L.  HEART: Regular rate and rhythm. S1S2 normal, no S3. No mumur, rubs, gallps.   ABDOMEN: Soft, nontender, nondistended. Bowel sounds present.  EXTREMITIES: 2+ peripheral pulses. No edema, clubbing, or cyanosis.   LYMPH: No lymphadenopathy noted  SKIN: No rashes or lesions    LABS:                        11.3   11.27 )-----------( 177      ( 01 Aug 2024 00:21 )             34.5     08-01    133<L>  |  101  |  16  ----------------------------<  104<H>  4.4   |  20<L>  |  1.00    Ca    9.3      01 Aug 2024 00:21  Phos  2.6     08-01  Mg     2.0     08-01    TPro  6.1  /  Alb  3.5  /  TBili  0.7  /  DBili  x   /  AST  20  /  ALT  8<L>  /  AlkPhos  91  08-01    LIVER FUNCTIONS - ( 01 Aug 2024 00:21 )  Alb: 3.5 g/dL / Pro: 6.1 g/dL / ALK PHOS: 91 U/L / ALT: 8 U/L / AST: 20 U/L / GGT: x             CAPILLARY BLOOD GLUCOSE                Urinalysis Basic - ( 01 Aug 2024 00:21 )    Color: x / Appearance: x / SG: x / pH: x  Gluc: 104 mg/dL / Ketone: x  / Bili: x / Urobili: x   Blood: x / Protein: x / Nitrite: x   Leuk Esterase: x / RBC: x / WBC x   Sq Epi: x / Non Sq Epi: x / Bacteria: x          RADIOLOGY & ADDITIONAL TESTS:  CXR:        Care Discussed with Consultants/Other Providers [ x] YES  [ ] NO           Patient is a 89y old  Male who presents with a chief complaint of CRT upgrade (31 Jul 2024 23:01)    HPI:  89 year old male, (Latvian speaking, daughter at bedside, unable to hear well with , used daughter as ), with PMHx of, HTN, CAD s/p PCI w/HAYDER, mixed restrictive and obstructive pulmonary disease, SESAR (not on CPAP), [place],  PAF on Eliquis ( on hold since saturday), CHB s/p dual chamber( [employer]) PPM implantation on 2/21/2017, who has developed progressive fatigue and dyspnea with ADL referred  for possible CRT upgrade given his generator is at [facility].    INTERVAL HPI/OVERNIGHT EVENTS:   No overnight events   Afebrile, hemodynamically stable     Subjective: Patient overall feeling well, no complaints, eager to go home. Endorses improved pain at CRT site. Pressure dressing over L. anterior chest.     ICU Vital Signs Last 24 Hrs  T(C): 36.9 (31 Jul 2024 22:00), Max: 36.9 (31 Jul 2024 22:00)  T(F): 98.4 (31 Jul 2024 22:00), Max: 98.4 (31 Jul 2024 22:00)  HR: 67 (01 Aug 2024 06:00) (60 - 74)  BP: 159/65 (01 Aug 2024 06:00) (96/54 - 178/72)  BP(mean): 94 (01 Aug 2024 06:00) (70 - 103)  ABP: --  ABP(mean): --  RR: 20 (01 Aug 2024 06:00) (17 - 29)  SpO2: 94% (01 Aug 2024 06:00) (92% - 100%)    O2 Parameters below as of 01 Aug 2024 05:44  Patient On (Oxygen Delivery Method): room air          I&O's Summary    31 Jul 2024 07:01  -  01 Aug 2024 07:00  --------------------------------------------------------  IN: 470 mL / OUT: 1225 mL / NET: -755 mL    MEDICATIONS  (STANDING):  albuterol/ipratropium for Nebulization 3 milliLiter(s) Nebulizer every 6 hours  atorvastatin 10 milliGRAM(s) Oral at bedtime  budesonide  80 MICROgram(s)/formoterol 4.5 MICROgram(s) Inhaler 2 Puff(s) Inhalation two times a day  clopidogrel Tablet 75 milliGRAM(s) Oral daily  finasteride 5 milliGRAM(s) Oral daily  metoprolol tartrate 12.5 milliGRAM(s) Oral two times a day  spironolactone 25 milliGRAM(s) Oral daily  tamsulosin 0.8 milliGRAM(s) Oral at bedtime  tiotropium 2.5 MICROgram(s) Inhaler 2 Puff(s) Inhalation daily    MEDICATIONS  (PRN):      Physical Exam    GENERAL: NAD, non-toxic appearing   HEAD: Atraumatic, normocephalic  EYES: EOMI, PERRLA, conjunctiva and sclera clear  NECK: Supple, no JVD, normal thyroid, no LAD  NEURO: AOx3, no FNDs  LUNGS: Pressure dressing at L. anterior chest, surrounding ecchymosis. Dressing clean. Clear to auscultation B/L.  HEART: Regular rate and rhythm. S1S2 normal, no S3. No mumur, rubs, gallps.   ABDOMEN: Soft, nontender, nondistended. Bowel sounds present.  EXTREMITIES: 2+ peripheral pulses. No edema, clubbing, or cyanosis.   LYMPH: No lymphadenopathy noted  SKIN: No rashes or lesions    LABS:                        11.3   11.27 )-----------( 177      ( 01 Aug 2024 00:21 )             34.5     08-01    133<L>  |  101  |  16  ----------------------------<  104<H>  4.4   |  20<L>  |  1.00    Ca    9.3      01 Aug 2024 00:21  Phos  2.6     08-01  Mg     2.0     08-01    TPro  6.1  /  Alb  3.5  /  TBili  0.7  /  DBili  x   /  AST  20  /  ALT  8<L>  /  AlkPhos  91  08-01    LIVER FUNCTIONS - ( 01 Aug 2024 00:21 )  Alb: 3.5 g/dL / Pro: 6.1 g/dL / ALK PHOS: 91 U/L / ALT: 8 U/L / AST: 20 U/L / GGT: x             CAPILLARY BLOOD GLUCOSE                Urinalysis Basic - ( 01 Aug 2024 00:21 )    Color: x / Appearance: x / SG: x / pH: x  Gluc: 104 mg/dL / Ketone: x  / Bili: x / Urobili: x   Blood: x / Protein: x / Nitrite: x   Leuk Esterase: x / RBC: x / WBC x   Sq Epi: x / Non Sq Epi: x / Bacteria: x          RADIOLOGY & ADDITIONAL TESTS:  CXR:        Care Discussed with Consultants/Other Providers [ x] YES  [ ] NO

## 2024-08-01 NOTE — PROGRESS NOTE ADULT - ASSESSMENT
89 year old M (Mongolian speaking, daughter at bedside, unable to hear well with , uses daughter as ), with PMHx of HTN, CAD s/p PCI w/HAYDER, mixed restrictive and obstructive pulmonary disease, SESAR (not on CPAP), [place], pAF on Eliquis (on hold since 7/28), CHB s/p dual chamber([employer]) PPM implantation on 2/21/2017, who has developed progressive fatigue and dyspnea with ADL referred  for possible CRT upgrade given his generator is at [facility]. He is s/p upgrade to CRT-P 7/30, with hypotension overnight that was responsive to IVF bolus, upgraded to CICU. No pericardial effusion noted on TTE.    1) CHB s/p [employer]SC[employer] dual ch PPM, s/p upgrade 7/30    - S/p upgrade to CRT-P on 7/30, post procedure CXR revealing leads in appropriate position  - Pocket press pressure dressing removed this AM. Small hematoma noted but within demarcation, unchanged from yesterday  - Continue to hold chemical DVT prophylaxis. Patient on Eliquis pre op, Eliquis to remain held until post op appt  - Normal device check by [facility] rep, ID card given, patient has remote monitor at home. Post procedure device teaching performed, WCK 8/12 3:40PM  - Incr BB to Toprol succinate 50mg q12h    To be discharged home today. D/w Dr. Koroma

## 2024-08-01 NOTE — PHYSICAL THERAPY INITIAL EVALUATION ADULT - PERTINENT HX OF CURRENT PROBLEM, REHAB EVAL
Pt is a 89 y/o M [place] speaking Hx HTN, PAF (Eliquis, last dose 7/27), CHB s/p dual chamber PPM ([employer], implantation 2/2017), CAD s/p PCI, mixed restrictive and obstructive pulmonary disease, SEASR (not on CPAP), [place]; admit 7/30 with c/o fatigue/NELSON, generator is [facility] s/p CRT upgrade c/b brief hypotensive post procedure s/p Mayur bolus, transfer to CICU for close monitoring.

## 2024-08-01 NOTE — DISCHARGE NOTE PROVIDER - CARE PROVIDERS DIRECT ADDRESSES
[email or web address]

## 2024-08-01 NOTE — DISCHARGE NOTE NURSING/CASE MANAGEMENT/SOCIAL WORK - NSDCPEFALRISK_GEN_ALL_CORE
For information on Fall & Injury Prevention, visit: [email or web address] OR  [email or web address] OR  https://www.cdc.gov/steadi/patient.html

## 2024-08-01 NOTE — DISCHARGE NOTE NURSING/CASE MANAGEMENT/SOCIAL WORK - PATIENT PORTAL LINK FT
You can access the FollowMyHealth Patient Portal offered by [facility] by registering at the following website: [email or web address]. By joining [employer]’s FollowMyHealth portal, you will also be able to view your health information using other applications (apps) compatible with our system.

## 2024-08-01 NOTE — PHYSICAL THERAPY INITIAL EVALUATION ADULT - ACTIVE RANGE OF MOTION EXAMINATION, REHAB EVAL
BUE shoulder flex to 90 deg/bilateral  lower extremity Active ROM was WFL (within functional limits)

## 2024-08-01 NOTE — PHYSICAL THERAPY INITIAL EVALUATION ADULT - ADDITIONAL COMMENTS
Pt lives in a house, w/ family, ramp to enter, 1 flight of steps w/ chair lift to bedroom/bathroom. Pt requires assistance for ADLs, daughter assists with bathing/lower body dressing, transfers. Pt requires supervision with ambulation (short distances) w/ straight cane and use of grab bars all throughout house for bed to/from bathroom. Pt is a limited household amb, mostly in bed except for meals and bowel movements. Pt uses transport chair w/in community.

## 2024-08-02 ENCOUNTER — NON-APPOINTMENT (OUTPATIENT)
Age: 89
End: 2024-08-02

## 2024-08-05 ENCOUNTER — NON-APPOINTMENT (OUTPATIENT)
Age: 89
End: 2024-08-05

## 2024-08-07 ENCOUNTER — APPOINTMENT (OUTPATIENT)
Dept: PULMONOLOGY | Facility: CLINIC | Age: 89
End: 2024-08-07

## 2024-08-08 ENCOUNTER — NON-APPOINTMENT (OUTPATIENT)
Age: 89
End: 2024-08-08

## 2024-08-08 ENCOUNTER — APPOINTMENT (OUTPATIENT)
Dept: ELECTROPHYSIOLOGY | Facility: CLINIC | Age: 89
End: 2024-08-08

## 2024-08-08 PROCEDURE — 93000 ELECTROCARDIOGRAM COMPLETE: CPT

## 2024-08-26 ENCOUNTER — NON-APPOINTMENT (OUTPATIENT)
Age: 89
End: 2024-08-26

## 2024-08-27 ENCOUNTER — APPOINTMENT (OUTPATIENT)
Dept: CARDIOLOGY | Facility: CLINIC | Age: 89
End: 2024-08-27
Payer: MEDICARE

## 2024-08-27 ENCOUNTER — NON-APPOINTMENT (OUTPATIENT)
Age: 89
End: 2024-08-27

## 2024-08-27 VITALS
SYSTOLIC BLOOD PRESSURE: 119 MMHG | HEIGHT: 67 IN | OXYGEN SATURATION: 95 % | HEART RATE: 62 BPM | WEIGHT: 148 LBS | BODY MASS INDEX: 23.23 KG/M2 | DIASTOLIC BLOOD PRESSURE: 83 MMHG

## 2024-08-27 DIAGNOSIS — I10 ESSENTIAL (PRIMARY) HYPERTENSION: ICD-10-CM

## 2024-08-27 DIAGNOSIS — R06.02 SHORTNESS OF BREATH: ICD-10-CM

## 2024-08-27 DIAGNOSIS — I48.0 PAROXYSMAL ATRIAL FIBRILLATION: ICD-10-CM

## 2024-08-27 DIAGNOSIS — I25.10 ATHEROSCLEROTIC HEART DISEASE OF NATIVE CORONARY ARTERY W/OUT ANGINA PECTORIS: ICD-10-CM

## 2024-08-27 PROCEDURE — 93000 ELECTROCARDIOGRAM COMPLETE: CPT

## 2024-08-27 PROCEDURE — 99214 OFFICE O/P EST MOD 30 MIN: CPT | Mod: 25

## 2024-08-27 RX ORDER — METOPROLOL TARTRATE 25 MG/1
25 TABLET, FILM COATED ORAL TWICE DAILY
Refills: 0 | Status: ACTIVE | COMMUNITY
Start: 2024-08-27

## 2024-08-27 RX ORDER — SPIRONOLACTONE 25 MG/1
25 TABLET ORAL
Refills: 0 | Status: ACTIVE | COMMUNITY
Start: 2024-08-27

## 2024-09-02 NOTE — PHYSICAL EXAM
[No Acute Distress] : no acute distress [Frail] : frail [Ill-Appearing] : ill-appearing [Normal Conjunctiva] : normal conjunctiva [Normal Venous Pressure] : normal venous pressure [No Carotid Bruit] : no carotid bruit [Normal S1, S2] : normal S1, S2 [No Rub] : no rub [No Gallop] : no gallop [Murmur] : murmur [Clear Lung Fields] : clear lung fields [Good Air Entry] : good air entry [No Respiratory Distress] : no respiratory distress  [Soft] : abdomen soft [Non Tender] : non-tender [No Masses/organomegaly] : no masses/organomegaly [Normal Bowel Sounds] : normal bowel sounds [Normal Gait] : normal gait [No Edema] : no edema [No Cyanosis] : no cyanosis [No Clubbing] : no clubbing [No Varicosities] : no varicosities [No Rash] : no rash [No Skin Lesions] : no skin lesions [Moves all extremities] : moves all extremities [No Focal Deficits] : no focal deficits [Normal Speech] : normal speech [Alert and Oriented] : alert and oriented [Normal memory] : normal memory

## 2024-09-02 NOTE — DISCUSSION/SUMMARY
[FreeTextEntry1] : Overall NELSON appears stable  PAF- c/w NOAC;  BP acceptable  No changes to meds   F/u 3-4 mo  [EKG obtained to assist in diagnosis and management of assessed problem(s)] : EKG obtained to assist in diagnosis and management of assessed problem(s)

## 2024-09-02 NOTE — PHYSICAL EXAM
[No Acute Distress] : no acute distress [Frail] : frail [Ill-Appearing] : ill-appearing [Normal Conjunctiva] : normal conjunctiva [Normal Venous Pressure] : normal venous pressure [No Carotid Bruit] : no carotid bruit [Normal S1, S2] : normal S1, S2 [No Rub] : no rub [No Gallop] : no gallop [Murmur] : murmur [Clear Lung Fields] : clear lung fields [Good Air Entry] : good air entry [No Respiratory Distress] : no respiratory distress  [Soft] : abdomen soft [Non Tender] : non-tender [Normal Bowel Sounds] : normal bowel sounds [No Masses/organomegaly] : no masses/organomegaly [Normal Gait] : normal gait [No Edema] : no edema [No Cyanosis] : no cyanosis [No Clubbing] : no clubbing [No Varicosities] : no varicosities [No Rash] : no rash [No Skin Lesions] : no skin lesions [Moves all extremities] : moves all extremities [No Focal Deficits] : no focal deficits [Normal Speech] : normal speech [Alert and Oriented] : alert and oriented [Normal memory] : normal memory

## 2024-09-02 NOTE — DISCUSSION/SUMMARY
[EKG obtained to assist in diagnosis and management of assessed problem(s)] : EKG obtained to assist in diagnosis and management of assessed problem(s) [FreeTextEntry1] : Overall NELSON appears stable  PAF- c/w NOAC;  BP acceptable  No changes to meds   F/u 3-4 mo

## 2024-09-02 NOTE — HISTORY OF PRESENT ILLNESS
[FreeTextEntry1] : Gianfranco is here for f/u  s/p BIV No LE edema  BP ok     No bleeding Device hematoma slowly resolving No Falls

## 2024-09-06 ENCOUNTER — NON-APPOINTMENT (OUTPATIENT)
Age: 89
End: 2024-09-06

## 2024-11-21 ENCOUNTER — APPOINTMENT (OUTPATIENT)
Age: 89
End: 2024-11-21

## 2024-11-26 ENCOUNTER — APPOINTMENT (OUTPATIENT)
Dept: ELECTROPHYSIOLOGY | Facility: CLINIC | Age: 89
End: 2024-11-26

## 2024-11-26 ENCOUNTER — APPOINTMENT (OUTPATIENT)
Dept: CARDIOLOGY | Facility: CLINIC | Age: 89
End: 2024-11-26
Payer: MEDICARE

## 2024-11-26 ENCOUNTER — NON-APPOINTMENT (OUTPATIENT)
Age: 89
End: 2024-11-26

## 2024-11-26 VITALS
BODY MASS INDEX: 24.01 KG/M2 | DIASTOLIC BLOOD PRESSURE: 73 MMHG | WEIGHT: 153 LBS | HEIGHT: 67 IN | SYSTOLIC BLOOD PRESSURE: 119 MMHG | OXYGEN SATURATION: 95 % | HEART RATE: 62 BPM

## 2024-11-26 DIAGNOSIS — I10 ESSENTIAL (PRIMARY) HYPERTENSION: ICD-10-CM

## 2024-11-26 DIAGNOSIS — I48.0 PAROXYSMAL ATRIAL FIBRILLATION: ICD-10-CM

## 2024-11-26 DIAGNOSIS — I25.10 ATHEROSCLEROTIC HEART DISEASE OF NATIVE CORONARY ARTERY W/OUT ANGINA PECTORIS: ICD-10-CM

## 2024-11-26 PROCEDURE — 93000 ELECTROCARDIOGRAM COMPLETE: CPT

## 2024-11-26 PROCEDURE — 99214 OFFICE O/P EST MOD 30 MIN: CPT | Mod: 25

## 2024-11-26 PROCEDURE — 93000 ELECTROCARDIOGRAM COMPLETE: CPT | Mod: XU

## 2024-11-26 PROCEDURE — 93280 PM DEVICE PROGR EVAL DUAL: CPT

## 2024-12-05 ENCOUNTER — APPOINTMENT (OUTPATIENT)
Dept: PULMONOLOGY | Facility: CLINIC | Age: 88
End: 2024-12-05

## 2024-12-24 PROBLEM — F10.90 ALCOHOL USE: Status: ACTIVE | Noted: 2019-11-22

## 2025-02-24 ENCOUNTER — RX RENEWAL (OUTPATIENT)
Age: 89
End: 2025-02-24

## 2025-02-25 ENCOUNTER — APPOINTMENT (OUTPATIENT)
Dept: ELECTROPHYSIOLOGY | Facility: CLINIC | Age: 89
End: 2025-02-25

## 2025-02-25 PROCEDURE — 93294 REM INTERROG EVL PM/LDLS PM: CPT

## 2025-02-25 PROCEDURE — 93296 REM INTERROG EVL PM/IDS: CPT

## 2025-03-11 ENCOUNTER — NON-APPOINTMENT (OUTPATIENT)
Age: 89
End: 2025-03-11

## 2025-03-11 ENCOUNTER — APPOINTMENT (OUTPATIENT)
Dept: ELECTROPHYSIOLOGY | Facility: CLINIC | Age: 89
End: 2025-03-11

## 2025-03-11 ENCOUNTER — APPOINTMENT (OUTPATIENT)
Dept: CARDIOLOGY | Facility: CLINIC | Age: 89
End: 2025-03-11
Payer: MEDICARE

## 2025-03-11 VITALS — HEART RATE: 61 BPM | BODY MASS INDEX: 23.18 KG/M2 | OXYGEN SATURATION: 94 % | WEIGHT: 148 LBS

## 2025-03-11 VITALS — DIASTOLIC BLOOD PRESSURE: 73 MMHG | SYSTOLIC BLOOD PRESSURE: 126 MMHG

## 2025-03-11 DIAGNOSIS — I25.10 ATHEROSCLEROTIC HEART DISEASE OF NATIVE CORONARY ARTERY W/OUT ANGINA PECTORIS: ICD-10-CM

## 2025-03-11 DIAGNOSIS — I10 ESSENTIAL (PRIMARY) HYPERTENSION: ICD-10-CM

## 2025-03-11 DIAGNOSIS — I48.0 PAROXYSMAL ATRIAL FIBRILLATION: ICD-10-CM

## 2025-03-11 PROCEDURE — 93000 ELECTROCARDIOGRAM COMPLETE: CPT

## 2025-03-11 PROCEDURE — 99214 OFFICE O/P EST MOD 30 MIN: CPT | Mod: 25

## 2025-04-15 DIAGNOSIS — I42.9 CARDIOMYOPATHY, UNSPECIFIED: ICD-10-CM

## 2025-05-28 ENCOUNTER — NON-APPOINTMENT (OUTPATIENT)
Age: 89
End: 2025-05-28

## 2025-05-28 ENCOUNTER — APPOINTMENT (OUTPATIENT)
Dept: ELECTROPHYSIOLOGY | Facility: CLINIC | Age: 89
End: 2025-05-28
Payer: MEDICARE

## 2025-05-28 PROCEDURE — 93294 REM INTERROG EVL PM/LDLS PM: CPT

## 2025-05-28 PROCEDURE — 93296 REM INTERROG EVL PM/IDS: CPT

## 2025-05-28 NOTE — ED PROVIDER NOTE - ATTENDING CONTRIBUTION TO CARE
End of Shift Note: shift timeframe [id number]    Pain Management:   Last Pain Level: 10/10  Pain Medication: Acetaminophen, Dilaudid, Oxycodone, Tizanidine, and Tramadol   Last Pain Medication Given at: 1837    Diet: general  Bowel Function  Activity: Up with assist    Significant Events- Chest Tube:     Other events related to chest tube: Chest tube removed this AM by NP    Discharge:  Anticipated Discharge Date: undetermined date.   Disposition:    **Original plan to discharge 5/28 in afternoon. Prior to discharge, patient reported increased pain chest tightness and SOB. Stat CXR & EKG obtained per protocol, NP bedside to assess patient. Patient to remain inpatient for continued monitoring and pain control.    I performed a history and physical exam of the patient and discussed their management with the resident. I reviewed the resident's note and agree with the documented findings and plan of care. My medical decision making and observations are found above.

## 2025-06-10 ENCOUNTER — APPOINTMENT (OUTPATIENT)
Dept: PULMONOLOGY | Facility: CLINIC | Age: 89
End: 2025-06-10
Payer: MEDICARE

## 2025-06-10 VITALS
HEART RATE: 63 BPM | BODY MASS INDEX: 23.7 KG/M2 | RESPIRATION RATE: 17 BRPM | WEIGHT: 151 LBS | OXYGEN SATURATION: 94 % | TEMPERATURE: 97.3 F | SYSTOLIC BLOOD PRESSURE: 120 MMHG | HEIGHT: 67 IN | DIASTOLIC BLOOD PRESSURE: 68 MMHG

## 2025-06-10 PROBLEM — J82.83 EOSINOPHILIC ASTHMA: Status: ACTIVE | Noted: 2025-06-10

## 2025-06-10 PROBLEM — J44.9 MIXED RESTRICTIVE AND OBSTRUCTIVE LUNG DISEASE: Status: ACTIVE | Noted: 2023-01-31

## 2025-06-10 PROCEDURE — 94010 BREATHING CAPACITY TEST: CPT

## 2025-06-10 PROCEDURE — 94799 UNLISTED PULMONARY SVC/PX: CPT

## 2025-06-10 PROCEDURE — 94729 DIFFUSING CAPACITY: CPT

## 2025-06-10 PROCEDURE — 95012 NITRIC OXIDE EXP GAS DETER: CPT

## 2025-06-10 PROCEDURE — 99214 OFFICE O/P EST MOD 30 MIN: CPT | Mod: 25

## 2025-06-10 RX ORDER — MONTELUKAST 10 MG/1
10 TABLET, FILM COATED ORAL
Qty: 1 | Refills: 1 | Status: ACTIVE | COMMUNITY
Start: 2025-06-10 | End: 1900-01-01

## 2025-06-17 ENCOUNTER — APPOINTMENT (OUTPATIENT)
Dept: CV DIAGNOSITCS | Facility: HOSPITAL | Age: 89
End: 2025-06-17

## 2025-06-17 ENCOUNTER — RESULT REVIEW (OUTPATIENT)
Age: 89
End: 2025-06-17

## 2025-06-17 ENCOUNTER — APPOINTMENT (OUTPATIENT)
Dept: ELECTROPHYSIOLOGY | Facility: CLINIC | Age: 89
End: 2025-06-17
Payer: MEDICARE

## 2025-06-17 ENCOUNTER — APPOINTMENT (OUTPATIENT)
Dept: CARDIOLOGY | Facility: CLINIC | Age: 89
End: 2025-06-17
Payer: MEDICARE

## 2025-06-17 ENCOUNTER — NON-APPOINTMENT (OUTPATIENT)
Age: 89
End: 2025-06-17

## 2025-06-17 ENCOUNTER — OUTPATIENT (OUTPATIENT)
Dept: OUTPATIENT SERVICES | Facility: HOSPITAL | Age: 89
LOS: 1 days | End: 2025-06-17
Payer: MEDICARE

## 2025-06-17 VITALS — HEART RATE: 62 BPM | DIASTOLIC BLOOD PRESSURE: 77 MMHG | SYSTOLIC BLOOD PRESSURE: 134 MMHG

## 2025-06-17 DIAGNOSIS — I42.9 CARDIOMYOPATHY, UNSPECIFIED: ICD-10-CM

## 2025-06-17 DIAGNOSIS — Z98.890 OTHER SPECIFIED POSTPROCEDURAL STATES: Chronic | ICD-10-CM

## 2025-06-17 DIAGNOSIS — Z95.5 PRESENCE OF CORONARY ANGIOPLASTY IMPLANT AND GRAFT: Chronic | ICD-10-CM

## 2025-06-17 DIAGNOSIS — I25.10 ATHEROSCLEROTIC HEART DISEASE OF NATIVE CORONARY ARTERY WITHOUT ANGINA PECTORIS: Chronic | ICD-10-CM

## 2025-06-17 DIAGNOSIS — Z90.49 ACQUIRED ABSENCE OF OTHER SPECIFIED PARTS OF DIGESTIVE TRACT: Chronic | ICD-10-CM

## 2025-06-17 DIAGNOSIS — Z96.643 PRESENCE OF ARTIFICIAL HIP JOINT, BILATERAL: Chronic | ICD-10-CM

## 2025-06-17 PROCEDURE — 99214 OFFICE O/P EST MOD 30 MIN: CPT | Mod: 25

## 2025-06-17 PROCEDURE — 93000 ELECTROCARDIOGRAM COMPLETE: CPT

## 2025-06-17 PROCEDURE — 93306 TTE W/DOPPLER COMPLETE: CPT | Mod: 26

## 2025-06-17 PROCEDURE — 93281 PM DEVICE PROGR EVAL MULTI: CPT

## 2025-06-17 PROCEDURE — 93306 TTE W/DOPPLER COMPLETE: CPT

## 2025-06-17 PROCEDURE — 93000 ELECTROCARDIOGRAM COMPLETE: CPT | Mod: XU

## 2025-08-29 ENCOUNTER — TRANSCRIPTION ENCOUNTER (OUTPATIENT)
Age: 89
End: 2025-08-29

## 2025-09-16 ENCOUNTER — APPOINTMENT (OUTPATIENT)
Dept: ELECTROPHYSIOLOGY | Facility: CLINIC | Age: 89
End: 2025-09-16